# Patient Record
Sex: FEMALE | Race: WHITE | NOT HISPANIC OR LATINO | Employment: FULL TIME | URBAN - METROPOLITAN AREA
[De-identification: names, ages, dates, MRNs, and addresses within clinical notes are randomized per-mention and may not be internally consistent; named-entity substitution may affect disease eponyms.]

---

## 2018-04-29 PROBLEM — R07.9 CHEST PAIN: Status: RESOLVED | Noted: 2018-04-29 | Resolved: 2018-04-29

## 2018-04-29 PROBLEM — R07.9 CHEST PAIN: Status: ACTIVE | Noted: 2018-04-29

## 2018-04-29 PROBLEM — R06.09 DYSPNEA ON EXERTION: Status: ACTIVE | Noted: 2018-04-29

## 2018-04-29 PROBLEM — R74.8 ELEVATED LIVER ENZYMES: Status: RESOLVED | Noted: 2018-04-29 | Resolved: 2018-04-29

## 2018-04-29 PROBLEM — R74.8 ELEVATED LIVER ENZYMES: Status: ACTIVE | Noted: 2018-04-29

## 2018-04-29 PROBLEM — R06.09 DYSPNEA ON EXERTION: Status: RESOLVED | Noted: 2018-04-29 | Resolved: 2018-04-29

## 2019-09-30 ENCOUNTER — OFFICE VISIT (OUTPATIENT)
Dept: GASTROENTEROLOGY | Facility: CLINIC | Age: 49
End: 2019-09-30
Payer: COMMERCIAL

## 2019-09-30 ENCOUNTER — LAB VISIT (OUTPATIENT)
Dept: LAB | Facility: HOSPITAL | Age: 49
End: 2019-09-30
Attending: INTERNAL MEDICINE
Payer: COMMERCIAL

## 2019-09-30 VITALS
BODY MASS INDEX: 21.38 KG/M2 | SYSTOLIC BLOOD PRESSURE: 105 MMHG | RESPIRATION RATE: 18 BRPM | DIASTOLIC BLOOD PRESSURE: 58 MMHG | WEIGHT: 136.25 LBS | HEART RATE: 62 BPM | HEIGHT: 67 IN

## 2019-09-30 DIAGNOSIS — R79.89 ABNORMAL LIVER FUNCTION TESTS: ICD-10-CM

## 2019-09-30 DIAGNOSIS — R79.89 ABNORMAL LIVER FUNCTION TESTS: Primary | ICD-10-CM

## 2019-09-30 LAB
ALBUMIN SERPL BCP-MCNC: 3.9 G/DL (ref 3.5–5.2)
ALP SERPL-CCNC: 122 U/L (ref 55–135)
ALT SERPL W/O P-5'-P-CCNC: 205 U/L (ref 10–44)
ANION GAP SERPL CALC-SCNC: 6 MMOL/L (ref 8–16)
AST SERPL-CCNC: 58 U/L (ref 10–40)
BILIRUB SERPL-MCNC: 0.5 MG/DL (ref 0.1–1)
BUN SERPL-MCNC: 15 MG/DL (ref 6–20)
CALCIUM SERPL-MCNC: 8.9 MG/DL (ref 8.7–10.5)
CHLORIDE SERPL-SCNC: 107 MMOL/L (ref 95–110)
CO2 SERPL-SCNC: 28 MMOL/L (ref 23–29)
CREAT SERPL-MCNC: 0.9 MG/DL (ref 0.5–1.4)
EST. GFR  (AFRICAN AMERICAN): >60 ML/MIN/1.73 M^2
EST. GFR  (NON AFRICAN AMERICAN): >60 ML/MIN/1.73 M^2
GLUCOSE SERPL-MCNC: 100 MG/DL (ref 70–110)
POTASSIUM SERPL-SCNC: 4.7 MMOL/L (ref 3.5–5.1)
PROT SERPL-MCNC: 6.9 G/DL (ref 6–8.4)
SODIUM SERPL-SCNC: 141 MMOL/L (ref 136–145)

## 2019-09-30 PROCEDURE — 99205 OFFICE O/P NEW HI 60 MIN: CPT | Mod: S$GLB,,, | Performed by: INTERNAL MEDICINE

## 2019-09-30 PROCEDURE — 80053 COMPREHEN METABOLIC PANEL: CPT

## 2019-09-30 PROCEDURE — 36415 COLL VENOUS BLD VENIPUNCTURE: CPT

## 2019-09-30 PROCEDURE — 99999 PR PBB SHADOW E&M-EST. PATIENT-LVL III: ICD-10-PCS | Mod: PBBFAC,,, | Performed by: INTERNAL MEDICINE

## 2019-09-30 PROCEDURE — 99205 PR OFFICE/OUTPT VISIT, NEW, LEVL V, 60-74 MIN: ICD-10-PCS | Mod: S$GLB,,, | Performed by: INTERNAL MEDICINE

## 2019-09-30 PROCEDURE — 3008F PR BODY MASS INDEX (BMI) DOCUMENTED: ICD-10-PCS | Mod: CPTII,S$GLB,, | Performed by: INTERNAL MEDICINE

## 2019-09-30 PROCEDURE — 99999 PR PBB SHADOW E&M-EST. PATIENT-LVL III: CPT | Mod: PBBFAC,,, | Performed by: INTERNAL MEDICINE

## 2019-09-30 PROCEDURE — 3008F BODY MASS INDEX DOCD: CPT | Mod: CPTII,S$GLB,, | Performed by: INTERNAL MEDICINE

## 2019-09-30 RX ORDER — CYCLOBENZAPRINE HCL 5 MG
5 TABLET ORAL
COMMUNITY
End: 2022-04-28

## 2019-09-30 RX ORDER — SUCRALFATE 1 G/1
1 TABLET ORAL 4 TIMES DAILY
COMMUNITY
End: 2021-07-12

## 2019-09-30 RX ORDER — PANTOPRAZOLE SODIUM 40 MG/1
40 TABLET, DELAYED RELEASE ORAL DAILY
COMMUNITY
End: 2021-07-12

## 2019-09-30 RX ORDER — ACETAMINOPHEN 500 MG
2000 TABLET ORAL
COMMUNITY
Start: 2019-02-12 | End: 2022-04-28

## 2019-09-30 NOTE — PROGRESS NOTES
CC: abdominal pain    HPI 49 y.o. female with history of severe, substernal chest/epigastric abdominal pain associated with difficulty swallowing but without associated regurgitation. She had an episode with severe pain while at a movie, then was diaphoretic, collapsed to the floor and was taken to [facility]. While there she states she had nuclear stress testing and was negative for cardiac ischemia. CTA was also performed and negative for embolism.    She states that cold or hot liquids and foods do not play a role in her symptoms. She had full work-up in [place] by GI doctor prior to coming to see me with EGD. She does not have those records but was told it was normal and biopsies were unrevealing.     Medical records reviewed. Additional history supplemented by nursing.     PMH:  None    Past Surgical History:   Procedure Laterality Date    ESOPHAGOGASTRODUODENOSCOPY      Novasure       Social History  Social History     Tobacco Use    Smoking status: Never Smoker    Smokeless tobacco: Never Used   Substance Use Topics    Alcohol use: Yes     Alcohol/week: 1.0 standard drinks     Types: 1 Glasses of wine per week    Drug use: Never     Family History   Problem Relation Age of Onset    Diabetes Mother      Review of Systems  General ROS: negative for chills, fever or weight loss  Psychological ROS: negative for hallucination, depression or suicidal ideation  Ophthalmic ROS: negative for blurry vision, photophobia or eye pain  ENT ROS: negative for epistaxis, sore throat or rhinorrhea  Respiratory ROS: no cough, shortness of breath, or wheezing  Cardiovascular ROS: no chest pain or dyspnea on exertion  Gastrointestinal ROS: no abdominal pain, change in bowel habits, or black/ bloody stools  Genito-Urinary ROS: no dysuria, trouble voiding, or hematuria  Musculoskeletal ROS: negative for gait disturbance or muscular weakness  Neurological ROS: no syncope or seizures; no ataxia  Dermatological ROS: negative for  "pruritis, rash and jaundice    Physical Examination  BP (!) 105/58   Pulse 62   Resp 18   Ht 5' 7" (1.702 m)   Wt 61.8 kg (136 lb 3.9 oz)   BMI 21.34 kg/m²   General appearance: alert, cooperative, no distress  HENT: Normocephalic, atraumatic, neck symmetrical, no nasal discharge   Eyes: conjunctivae/corneas clear, PERRL, EOM's intact  Lungs: clear to auscultation bilaterally, no dullness to percussion bilaterally  Heart: regular rate and rhythm without rub; no displacement of the PMI   Abdomen: soft, non-tender; bowel sounds normoactive; no organomegaly  Extremities: extremities symmetric; no clubbing, cyanosis, or edema  Integument: Skin color, texture, turgor normal; no rashes; hair distrubution normal  Neurologic: Alert and oriented X 3, normal strength, normal coordination and gait  Psychiatric: no pressured speech; normal affect; no evidence of impaired cognition     Labs:  H/H 12.6/36.4  Plt 279  prior AST 2229, ALT 1348  Tbili 1.1  AP 86    Imaging:  CTA: No acute process of the thorax.  No evidence for pulmonary embolism.    Independently reviewed    Assessment:   1. Epigastric/chest pain  2. History of abnormal liver function tests (AST, ALT in 1000s during hospitalization 2018)     Plan:   -Obtain OSH records from prior GI physician in [place] who performed EGD  -Discontinue Protonix and Carafate for now as she has taken several months without improvement  -Repeat LFTs, suspect they may have been elevated in the setting of hypotension given hepatitis panel was negative at that time  -Follow up in 1 month, she may require esophageal motility study if these symptoms are related to esophageal spasm  -Could consider trial of nifedipine in future given that she has had relief with muscle relaxer but would review records first    Delonte Meraz MD  [facility]    "

## 2019-10-02 ENCOUNTER — PATIENT MESSAGE (OUTPATIENT)
Dept: GASTROENTEROLOGY | Facility: CLINIC | Age: 49
End: 2019-10-02

## 2019-10-04 ENCOUNTER — TELEPHONE (OUTPATIENT)
Dept: GASTROENTEROLOGY | Facility: CLINIC | Age: 49
End: 2019-10-04

## 2019-10-04 DIAGNOSIS — R79.89 ABNORMAL LIVER FUNCTION TESTS: Primary | ICD-10-CM

## 2019-10-04 NOTE — TELEPHONE ENCOUNTER
Called to review abnormal liver function tests with patient over telephone. Will plan further work-up with Iron, TIBC, ferritin, ISABEL, ASMA, AMA, Hep B surface antigen/antibody and Hep C antibody. She will also need Hep A total IgG, alpha 1 antitrypsin and ceruloplasmin. She will need ultrasound with elastography or fibroscan in the future as well as referral to hepatology. She verbalized understanding.

## 2019-10-28 ENCOUNTER — PATIENT MESSAGE (OUTPATIENT)
Dept: GASTROENTEROLOGY | Facility: CLINIC | Age: 49
End: 2019-10-28

## 2019-10-28 ENCOUNTER — TELEPHONE (OUTPATIENT)
Dept: GASTROENTEROLOGY | Facility: CLINIC | Age: 49
End: 2019-10-28

## 2019-10-28 NOTE — TELEPHONE ENCOUNTER
----- Message from Jeni  sent at 10/28/2019 12:30 PM CDT -----  Contact: pt  Type:  Patient Returning Call    Who Called:  pt  Who Left Message for Patient:  Rodri  Does the patient know what this is regarding?:  yes  Best Call Back Number:    Additional Information:  Pt is returning call from Nurse ,please call back with advice regarding appt call after 3 thanks

## 2019-11-25 ENCOUNTER — LAB VISIT (OUTPATIENT)
Dept: LAB | Facility: HOSPITAL | Age: 49
End: 2019-11-25
Attending: INTERNAL MEDICINE
Payer: COMMERCIAL

## 2019-11-25 ENCOUNTER — OFFICE VISIT (OUTPATIENT)
Dept: GASTROENTEROLOGY | Facility: CLINIC | Age: 49
End: 2019-11-25
Payer: COMMERCIAL

## 2019-11-25 VITALS
HEART RATE: 63 BPM | BODY MASS INDEX: 20.35 KG/M2 | HEIGHT: 67 IN | SYSTOLIC BLOOD PRESSURE: 123 MMHG | RESPIRATION RATE: 16 BRPM | DIASTOLIC BLOOD PRESSURE: 70 MMHG | WEIGHT: 129.63 LBS

## 2019-11-25 DIAGNOSIS — R79.89 ABNORMAL LIVER FUNCTION TESTS: Primary | ICD-10-CM

## 2019-11-25 DIAGNOSIS — R79.89 ABNORMAL LIVER FUNCTION TESTS: ICD-10-CM

## 2019-11-25 LAB
A1AT SERPL-MCNC: 138 MG/DL (ref 100–190)
BASOPHILS # BLD AUTO: 0.04 K/UL (ref 0–0.2)
BASOPHILS NFR BLD: 0.9 % (ref 0–1.9)
CERULOPLASMIN SERPL-MCNC: 38 MG/DL (ref 15–45)
DIFFERENTIAL METHOD: NORMAL
EOSINOPHIL # BLD AUTO: 0.1 K/UL (ref 0–0.5)
EOSINOPHIL NFR BLD: 1.6 % (ref 0–8)
ERYTHROCYTE [DISTWIDTH] IN BLOOD BY AUTOMATED COUNT: 13.5 % (ref 11.5–14.5)
FERRITIN SERPL-MCNC: 83 NG/ML (ref 20–300)
HCT VFR BLD AUTO: 39.8 % (ref 37–48.5)
HGB BLD-MCNC: 13 G/DL (ref 12–16)
IMM GRANULOCYTES # BLD AUTO: 0.01 K/UL (ref 0–0.04)
LYMPHOCYTES # BLD AUTO: 1.5 K/UL (ref 1–4.8)
LYMPHOCYTES NFR BLD: 34.2 % (ref 18–48)
MCH RBC QN AUTO: 30.2 PG (ref 27–31)
MCHC RBC AUTO-ENTMCNC: 32.7 G/DL (ref 32–36)
MCV RBC AUTO: 93 FL (ref 82–98)
MONOCYTES # BLD AUTO: 0.3 K/UL (ref 0.3–1)
MONOCYTES NFR BLD: 7.6 % (ref 4–15)
NEUTROPHILS # BLD AUTO: 2.5 K/UL (ref 1.8–7.7)
NEUTROPHILS NFR BLD: 55.5 % (ref 38–73)
NRBC BLD-RTO: 0 /100 WBC
PLATELET # BLD AUTO: 305 K/UL (ref 150–350)
PMV BLD AUTO: 9.5 FL (ref 9.2–12.9)
RBC # BLD AUTO: 4.3 M/UL (ref 4–5.4)
WBC # BLD AUTO: 4.48 K/UL (ref 3.9–12.7)

## 2019-11-25 PROCEDURE — 99214 OFFICE O/P EST MOD 30 MIN: CPT | Mod: S$GLB,,, | Performed by: INTERNAL MEDICINE

## 2019-11-25 PROCEDURE — 86706 HEP B SURFACE ANTIBODY: CPT

## 2019-11-25 PROCEDURE — 82390 ASSAY OF CERULOPLASMIN: CPT

## 2019-11-25 PROCEDURE — 82103 ALPHA-1-ANTITRYPSIN TOTAL: CPT

## 2019-11-25 PROCEDURE — 86256 FLUORESCENT ANTIBODY TITER: CPT | Mod: 91

## 2019-11-25 PROCEDURE — 86790 VIRUS ANTIBODY NOS: CPT

## 2019-11-25 PROCEDURE — 86235 NUCLEAR ANTIGEN ANTIBODY: CPT

## 2019-11-25 PROCEDURE — 3008F BODY MASS INDEX DOCD: CPT | Mod: CPTII,S$GLB,, | Performed by: INTERNAL MEDICINE

## 2019-11-25 PROCEDURE — 99999 PR PBB SHADOW E&M-EST. PATIENT-LVL III: CPT | Mod: PBBFAC,,, | Performed by: INTERNAL MEDICINE

## 2019-11-25 PROCEDURE — 86038 ANTINUCLEAR ANTIBODIES: CPT

## 2019-11-25 PROCEDURE — 36415 COLL VENOUS BLD VENIPUNCTURE: CPT

## 2019-11-25 PROCEDURE — 83540 ASSAY OF IRON: CPT

## 2019-11-25 PROCEDURE — 99999 PR PBB SHADOW E&M-EST. PATIENT-LVL III: ICD-10-PCS | Mod: PBBFAC,,, | Performed by: INTERNAL MEDICINE

## 2019-11-25 PROCEDURE — 85025 COMPLETE CBC W/AUTO DIFF WBC: CPT

## 2019-11-25 PROCEDURE — 82728 ASSAY OF FERRITIN: CPT

## 2019-11-25 PROCEDURE — 86803 HEPATITIS C AB TEST: CPT

## 2019-11-25 PROCEDURE — 87340 HEPATITIS B SURFACE AG IA: CPT

## 2019-11-25 PROCEDURE — 99214 PR OFFICE/OUTPT VISIT, EST, LEVL IV, 30-39 MIN: ICD-10-PCS | Mod: S$GLB,,, | Performed by: INTERNAL MEDICINE

## 2019-11-25 PROCEDURE — 3008F PR BODY MASS INDEX (BMI) DOCUMENTED: ICD-10-PCS | Mod: CPTII,S$GLB,, | Performed by: INTERNAL MEDICINE

## 2019-11-25 RX ORDER — NITROGLYCERIN 0.4 MG/1
0.4 TABLET SUBLINGUAL
COMMUNITY
Start: 2019-06-19 | End: 2021-07-12

## 2019-11-25 NOTE — PROGRESS NOTES
"CC: abnormal liver function tests     HPI 49 y.o. female with incidental abnormal elevation in liver function tests without associated abdominal pain. During last office visit I noted she had a history of abnormal liver function testing. She had never had work-up for liver function tests in the past. She does report that her father had liver cirrhosis in his 40s-50s, but was told it was due to alcohol.    She also reports a history of substernal chest discomfort that resolved with one dose of cyclobenzaprine. She denies dysphagia. She denies odynophagia. She denies issues related to cold or hot liquids. She has had work-up in [place] by GI doctor prior to coming to see me with EGD. She reports the EGD was normal. She has had nuclear stress testing and was negative for cardiac ischemia. CTA was also performed and negative for embolism.    PMH  Abnormal liver function tests    Review of Systems  General ROS: negative for chills, fever or weight loss  Cardiovascular ROS: no chest pain or dyspnea on exertion  Gastrointestinal ROS: no abdominal pain, change in bowel habits, or black/ bloody stools    Physical Examination  /70 (BP Location: Right arm, Patient Position: Sitting, BP Method: Medium (Automatic))   Pulse 63   Resp 16   Ht 5' 7" (1.702 m)   Wt 58.8 kg (129 lb 10.1 oz)   BMI 20.30 kg/m²   General appearance: alert, cooperative, no distress  HENT: Normocephalic, atraumatic, neck symmetrical, no nasal discharge   Lungs: clear to auscultation bilaterally, no dullness to percussion bilaterally  Heart: regular rate and rhythm without rub; no displacement of the PMI   Abdomen: soft, non-tender; bowel sounds normoactive; no organomegaly  Extremities: extremities symmetric; no clubbing, cyanosis, or edema  Neurologic: Alert and oriented X 3, normal strength, normal coordination and gait    Labs:  Lab Results   Component Value Date    WBC 9.62 04/28/2018    HGB 12.6 04/28/2018    HCT 36.4 (L) 04/28/2018    MCV " 88 04/28/2018     04/28/2018     Imaging:  Lab Results   Component Value Date     (H) 09/30/2019    AST 58 (H) 09/30/2019    ALKPHOS 122 09/30/2019    BILITOT 0.5 09/30/2019          Imaging:  CTA: No acute process of the thorax.  No evidence for pulmonary embolism.     Independently reviewed     Assessment:   1. Epigastric/chest pain  2. Abnormal liver function tests     Plan:   -Recommend she bring OSH records from prior GI physician in [place] who performed EGD  -she may require esophageal motility study if these symptoms continue to occur and are related to esophageal spasm  -Could consider trial of nifedipine in future given that she has had relief with muscle relaxer    -Will plan further work-up of abnormal liver enzymes with Iron, TIBC, ferritin, ISABEL, ASMA, AMA, Hep B surface antigen/antibody and Hep C antibody. She will also need Hep A total IgG, alpha 1 antitrypsin and ceruloplasmin.   -She will need ultrasound with elastography or fibroscan in the future as well as possible referral to hepatology  -Consider liver biopsy if lab work is unrevealing of etiology of liver function abnormalities    Delonte Meraz MD  [facility]

## 2019-11-26 LAB
ANA SER QL IF: NORMAL
HBV SURFACE AB SER-ACNC: NEGATIVE M[IU]/ML
HBV SURFACE AG SERPL QL IA: NEGATIVE
HCV AB SERPL QL IA: NEGATIVE
HEPATITIS A ANTIBODY, IGG: NEGATIVE
IRON SERPL-MCNC: 89 UG/DL (ref 30–160)
MITOCHONDRIA AB TITR SER IF: NORMAL {TITER}
SATURATED IRON: 21 % (ref 20–50)
TOTAL IRON BINDING CAPACITY: 417 UG/DL (ref 250–450)
TRANSFERRIN SERPL-MCNC: 282 MG/DL (ref 200–375)

## 2019-11-27 LAB — SMOOTH MUSCLE AB TITR SER IF: ABNORMAL {TITER}

## 2019-12-05 ENCOUNTER — TELEPHONE (OUTPATIENT)
Dept: GASTROENTEROLOGY | Facility: CLINIC | Age: 49
End: 2019-12-05

## 2019-12-05 DIAGNOSIS — R79.89 ABNORMAL LFTS (LIVER FUNCTION TESTS): Primary | ICD-10-CM

## 2019-12-05 NOTE — PROGRESS NOTES
Reviewed lab results with patient over telephone. ISABEL, AMA, ceruloplasmin, transferrin saturation neagtive. ASMA positive, will plan on liver ultrasound first then further work up with anti-LKM.     If abdominal ultrasound is unrevealing then will pursue liver biopsy.

## 2019-12-05 NOTE — TELEPHONE ENCOUNTER
----- Message from Delonte Meraz MD sent at 12/5/2019  3:16 PM CST -----  This patient needs to be scheduled for a RUQ ultrasound.

## 2019-12-06 ENCOUNTER — HOSPITAL ENCOUNTER (OUTPATIENT)
Dept: RADIOLOGY | Facility: CLINIC | Age: 49
Discharge: HOME | End: 2019-12-06
Attending: INTERNAL MEDICINE
Payer: COMMERCIAL

## 2019-12-06 DIAGNOSIS — R79.89 ABNORMAL LFTS (LIVER FUNCTION TESTS): ICD-10-CM

## 2019-12-06 PROCEDURE — 76705 US ABDOMEN LIMITED: ICD-10-PCS | Mod: 26,,, | Performed by: RADIOLOGY

## 2019-12-06 PROCEDURE — 76705 ECHO EXAM OF ABDOMEN: CPT | Mod: 26,,, | Performed by: RADIOLOGY

## 2019-12-06 PROCEDURE — 76705 ECHO EXAM OF ABDOMEN: CPT | Mod: TC,PO

## 2019-12-09 ENCOUNTER — TELEPHONE (OUTPATIENT)
Dept: GASTROENTEROLOGY | Facility: CLINIC | Age: 49
End: 2019-12-09

## 2019-12-09 NOTE — TELEPHONE ENCOUNTER
----- Message from Delonte Meraz MD sent at 12/9/2019  6:55 AM CST -----  Please notify patient that the ultrasound showed a normal liver. There may be some gallstones, but otherwise liver appeared normal. She will need to be scheduled for liver biopsy. She should also obtain the additional labs I ordered.

## 2019-12-10 ENCOUNTER — TELEPHONE (OUTPATIENT)
Dept: GASTROENTEROLOGY | Facility: CLINIC | Age: 49
End: 2019-12-10

## 2019-12-10 NOTE — TELEPHONE ENCOUNTER
----- Message from Agatha Boucher sent at 12/10/2019  9:49 AM CST -----  Contact: [facility]  Type: Needs Medical Advice    Who Called:  Mad[facility]  Best Call Back Number: Madison at   Additional Information: Calling about the referral for a liver biopsy

## 2019-12-12 ENCOUNTER — TELEPHONE (OUTPATIENT)
Dept: RADIOLOGY | Facility: HOSPITAL | Age: 49
End: 2019-12-12

## 2019-12-12 ENCOUNTER — TELEPHONE (OUTPATIENT)
Dept: GASTROENTEROLOGY | Facility: CLINIC | Age: 49
End: 2019-12-12

## 2019-12-12 DIAGNOSIS — R79.89 ABNORMAL LFTS (LIVER FUNCTION TESTS): Primary | ICD-10-CM

## 2019-12-12 NOTE — PLAN OF CARE
Spoke to Kane Guo LPN at Dr. Meraz's office regarding reschedule of liver biopsy for 1/2/20.  Told her patient would like an earlier date. Kane said she will call other places and see if that is possible and then call patient. Also, Dr. Meraz will need to put in a correct order for the liver biopsy (gave her order number [id number]) and will need an updated H&P due to the fact that the last one was on 11/25/19 and they have to be within 30 days of procedure. Phyla given [facility] radiology RN's direct number.

## 2019-12-12 NOTE — PROGRESS NOTES
Scheduled: 1/2/20   7am arrival at [facility] for 9am procedure. NPO night before, make take morning meds with sip if water. Must have ride home due to sedation administration. Hold aspirin and aspirin containing products for 7 days prior to procedure and NSAIDS 2 days prior. Call if have any further questions.  Will call patient if we have a cancellation so she can be rescheduled at an earlier date.

## 2019-12-17 ENCOUNTER — TELEPHONE (OUTPATIENT)
Dept: GASTROENTEROLOGY | Facility: CLINIC | Age: 49
End: 2019-12-17

## 2019-12-17 DIAGNOSIS — R79.89 ABNORMAL LFTS (LIVER FUNCTION TESTS): Primary | ICD-10-CM

## 2019-12-17 NOTE — NURSING
"Order received for liver biopsy (function). Contacted [facility] pathology to see if there was a specific type of medium or any additional steps that needed to be taken to fulfill request. Per Jann[facility] Pathology tech- send specimens in formalin under epic order "Tissue Specimen to Pathology"   "

## 2019-12-17 NOTE — TELEPHONE ENCOUNTER
----- Message from Princess TITO Blackwell sent at 12/17/2019  1:41 PM CST -----  Contact: Smiley [facility]  Type: Needs Medical Advice    Who Called: Smiley ROBERTO   Best Call Back Number:   Additional Information: Requesting a call back in regards to patient needs PA for liver biopsy. Please call to advise

## 2019-12-17 NOTE — NURSING
VIRGILIO Kohler RN   Phone Number: [phone]          Previous Messages      ----- Message -----   From: Delonte Meraz MD   Sent: 12/17/2019   1:37 PM CST   To: Leti Hardy LPN   Subject: RE: Order Liver Biopsy under CT guidance ins*     Yes, I would like CT guided liver biopsy. Thank you.   ----- Message -----   From: Leti Hardy LPN   Sent: 12/17/2019  12:37 PM CST   To: Delonte Meraz MD   Subject: FW: Order Liver Biopsy under CT guidance ins*         ----- Message -----   From: Rin Gonzáles RN   Sent: 12/17/2019  12:15 PM CST   To: Radha Hernandez RN, Mariya SANTOS Staff   Subject: Order Liver Biopsy under CT guidance instead*     We received a request for an ultrasound liver biopsy for function on the attached patient. Our radiologist would like to use CT guidance if Dr Meraz is okay with that. Please let me know. Thank you.

## 2019-12-19 ENCOUNTER — TELEPHONE (OUTPATIENT)
Dept: GASTROENTEROLOGY | Facility: CLINIC | Age: 49
End: 2019-12-19

## 2019-12-19 NOTE — TELEPHONE ENCOUNTER
----- Message from Leti Andrew sent at 12/19/2019  1:14 PM CST -----  Contact: Patient   Type:  Patient Returning Call    Who Called:  Patient  Who Left Message for Patient: Phyla   Does the patient know what this is regarding?: An appt  Best Call Back Number:[phone]  Additional Information:Pt requesting a call back regarding an appt she has scheduled on 12/26/19 to see if she can reschedule appt. Please call and advise.

## 2019-12-19 NOTE — TELEPHONE ENCOUNTER
----- Message from Chhaya Jenkins sent at 12/19/2019 11:31 AM CST -----  Contact: Self   Type: Patient Call Back    What is the request in detail: Pt calling to speak to a nurse regarding rescheduling appt.     Can the clinic reply by [facility]? No    Would the patient rather a call back or a response via [facility]? Call back     Best call back number: [phone]

## 2019-12-20 ENCOUNTER — TELEPHONE (OUTPATIENT)
Dept: RADIOLOGY | Facility: HOSPITAL | Age: 49
End: 2019-12-20

## 2019-12-20 ENCOUNTER — TELEPHONE (OUTPATIENT)
Dept: GASTROENTEROLOGY | Facility: CLINIC | Age: 49
End: 2019-12-20

## 2019-12-20 DIAGNOSIS — R79.89 ABNORMAL LFTS (LIVER FUNCTION TESTS): Primary | ICD-10-CM

## 2019-12-20 NOTE — TELEPHONE ENCOUNTER
----- Message from Delonte Meraz MD sent at 12/20/2019  4:29 PM CST -----  This patient needs referral to hepatology on the [place] in January then will plan on liver biopsy after she sees hepatology, if they would like biopsy. Please cancel liver biopsy that is scheduled.

## 2019-12-20 NOTE — PROGRESS NOTES
Pre procedure instructions given.  Instructed on arrival time of 0700 on 1/14/20 to [facility].  NPO and to have a ride home.  Pt verbalized understanding.  
No

## 2019-12-23 ENCOUNTER — PATIENT MESSAGE (OUTPATIENT)
Dept: GASTROENTEROLOGY | Facility: CLINIC | Age: 49
End: 2019-12-23

## 2020-01-02 ENCOUNTER — TELEPHONE (OUTPATIENT)
Dept: GASTROENTEROLOGY | Facility: CLINIC | Age: 50
End: 2020-01-02

## 2020-01-02 NOTE — TELEPHONE ENCOUNTER
----- Message from Cely Sousa sent at 1/2/2020  8:53 AM CST -----  Contact: Smiley with [facility]  Type: Needs Medical Advice    Who Called:  Smiley Maharaj Call Back Number: [phone] Fax:[phone]  Additional Information: Smiley is needing a call back in regards to this patient. She needs to speak with someone about verification and clarification of the order for a liver biopsy and needs updated physical information sent.Please call back and advise.

## 2020-01-02 NOTE — TELEPHONE ENCOUNTER
Spoke w/ pt to inform for biopsy cancellation and also to inform or hepatology referral. Pt verbalized understanding.

## 2020-01-08 ENCOUNTER — PATIENT MESSAGE (OUTPATIENT)
Dept: GASTROENTEROLOGY | Facility: CLINIC | Age: 50
End: 2020-01-08

## 2020-01-23 ENCOUNTER — DOCUMENTATION ONLY (OUTPATIENT)
Dept: TRANSPLANT | Facility: CLINIC | Age: 50
End: 2020-01-23

## 2020-01-23 NOTE — LETTER
January 23, 2020    Kamilla Montes  [id number] [address] [facility] [address]      Dear Kamilla Montes:    Your doctor has referred you to the [facility]. We are sending this letter to remind you to make an appointment with us to complete the referral process.     Please call us at [phone] to schedule an appointment. We look forward to seeing you soon.     If you received a call and have been scheduled, please disregard this letter.       Sincerely,        [facility]   [facility] [place], [place] [id number] [phone]

## 2020-01-23 NOTE — NURSING
Pt records reviewed.   Pt will be referred to Hepatology.  Abnormal LFTs (liver function tests)  Initial referral received  from the [facility]que.   Referring Provider/diagnosis  FREDIS WICK      Referral letter sent to patient.

## 2020-02-17 ENCOUNTER — OFFICE VISIT (OUTPATIENT)
Dept: DERMATOLOGY | Facility: CLINIC | Age: 50
End: 2020-02-17
Payer: COMMERCIAL

## 2020-02-17 ENCOUNTER — TELEPHONE (OUTPATIENT)
Dept: DERMATOLOGY | Facility: CLINIC | Age: 50
End: 2020-02-17

## 2020-02-17 DIAGNOSIS — L65.9 HAIR LOSS DISORDER: Primary | ICD-10-CM

## 2020-02-17 PROCEDURE — 99999 PR PBB SHADOW E&M-EST. PATIENT-LVL II: CPT | Mod: PBBFAC,,, | Performed by: PHYSICIAN ASSISTANT

## 2020-02-17 PROCEDURE — 99202 PR OFFICE/OUTPT VISIT, NEW, LEVL II, 15-29 MIN: ICD-10-PCS | Mod: S$GLB,,, | Performed by: PHYSICIAN ASSISTANT

## 2020-02-17 PROCEDURE — 99202 OFFICE O/P NEW SF 15 MIN: CPT | Mod: S$GLB,,, | Performed by: PHYSICIAN ASSISTANT

## 2020-02-17 PROCEDURE — 99999 PR PBB SHADOW E&M-EST. PATIENT-LVL II: ICD-10-PCS | Mod: PBBFAC,,, | Performed by: PHYSICIAN ASSISTANT

## 2020-02-17 NOTE — TELEPHONE ENCOUNTER
Called pt per [facility]. Scheduled pt to come in to have a scalp biopsy.    TB    ----- Message from Padmini Garduno PA-C sent at 2/17/2020  3:35 PM CST -----  Please contact pt to schedule biopsy - either call or message, she is fine with whatever. I've already discussed it with her. Thanks

## 2020-02-17 NOTE — PROGRESS NOTES
"  Subjective:       Patient ID:  Kamilla Montes is a 49 y.o. female who presents for   Chief Complaint   Patient presents with    Hair Loss     scalp,  X3yrs, shedding, otc tx      Hair Loss  - Initial  Affected locations: scalp (and eyebrows and eyelashes)  Duration: 3 years (worsened recently)  Signs and Symptoms: thinning, shedding, receding frontal hairline.  Treatments tried: Biotin and MTV gummies x few months; Rodan and Fields lash boost x 1 yr.  Improvement on treatment: no relief    Hx of significantly elevated LFTs (seeing GI) - liver US wnl. Has appt with hepatology next month. Also with hx of positive anti-smooth muscle antibody.  + substernal chest pain that resolves with flexeril.  No known hx of thyroid d/o (TSH slightly elevated in 2018).  Recent iron, TIBC, and CBC wnl.     Review of Systems   Constitutional: Negative for fever, weight loss, weight gain and fatigue.   HENT: Negative for mouth sores.    Genitourinary: Negative for irregular periods (could not take OCP 2/2 "blood clots" in legs; s/p Novasure - no longer has cycle).   Skin: Negative for itching and dry skin.   Hematologic/Lymphatic: Does not bruise/bleed easily.        Objective:    Physical Exam   Constitutional: She appears well-developed and well-nourished. No distress.   Neurological: She is alert and oriented to person, place, and time. She is not disoriented.   Psychiatric: She has a normal mood and affect.   Skin:   Areas Examined (abnormalities noted in diagram):   Scalp / Hair Palpated and Inspected  Head / Face Inspection Performed  Neck Inspection Performed              Diagram Legend     Erythematous scaling macule/papule c/w actinic keratosis       Vascular papule c/w angioma      Pigmented verrucoid papule/plaque c/w seborrheic keratosis      Yellow umbilicated papule c/w sebaceous hyperplasia      Irregularly shaped tan macule c/w lentigo     1-2 mm smooth white papules consistent with Milia      Movable subcutaneous " cyst with punctum c/w epidermal inclusion cyst      Subcutaneous movable cyst c/w pilar cyst      Firm pink to brown papule c/w dermatofibroma      Pedunculated fleshy papule(s) c/w skin tag(s)      Evenly pigmented macule c/w junctional nevus     Mildly variegated pigmented, slightly irregular-bordered macule c/w mildly atypical nevus      Flesh colored to evenly pigmented papule c/w intradermal nevus       Pink pearly papule/plaque c/w basal cell carcinoma      Erythematous hyperkeratotic cursted plaque c/w SCC      Surgical scar with no sign of skin cancer recurrence      Open and closed comedones      Inflammatory papules and pustules      Verrucoid papule consistent consistent with wart     Erythematous eczematous patches and plaques     Dystrophic onycholytic nail with subungual debris c/w onychomycosis     Umbilicated papule    Erythematous-base heme-crusted tan verrucoid plaque consistent with inflamed seborrheic keratosis     Erythematous Silvery Scaling Plaque c/w Psoriasis     See annotation    Assessment / Plan:      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 Pt is going to a [place] this weekend and would like to defer until next week.    Continue to f/u with hepatology and GI for elevated LFTs and +ASMA.         Follow up for scalp biopsy at pt convenience.

## 2020-03-06 ENCOUNTER — PATIENT MESSAGE (OUTPATIENT)
Dept: DERMATOLOGY | Facility: CLINIC | Age: 50
End: 2020-03-06

## 2020-03-12 ENCOUNTER — TELEPHONE (OUTPATIENT)
Dept: HEPATOLOGY | Facility: CLINIC | Age: 50
End: 2020-03-12

## 2020-03-12 NOTE — TELEPHONE ENCOUNTER
Attempted to contact patient and confirm appointment for Monday but patient did not answer. Left patient a voicemail stating the purpose for the call. Awaiting a call back.

## 2020-03-16 ENCOUNTER — LAB VISIT (OUTPATIENT)
Dept: LAB | Facility: HOSPITAL | Age: 50
End: 2020-03-16
Attending: INTERNAL MEDICINE
Payer: COMMERCIAL

## 2020-03-16 ENCOUNTER — OFFICE VISIT (OUTPATIENT)
Dept: HEPATOLOGY | Facility: CLINIC | Age: 50
End: 2020-03-16
Attending: INTERNAL MEDICINE
Payer: COMMERCIAL

## 2020-03-16 VITALS
OXYGEN SATURATION: 99 % | SYSTOLIC BLOOD PRESSURE: 116 MMHG | HEART RATE: 69 BPM | BODY MASS INDEX: 20.73 KG/M2 | WEIGHT: 132.06 LBS | DIASTOLIC BLOOD PRESSURE: 65 MMHG | RESPIRATION RATE: 18 BRPM | HEIGHT: 67 IN

## 2020-03-16 DIAGNOSIS — R74.8 ELEVATED LIVER ENZYMES: ICD-10-CM

## 2020-03-16 DIAGNOSIS — R74.8 ELEVATED LIVER ENZYMES: Primary | ICD-10-CM

## 2020-03-16 LAB
ALBUMIN SERPL BCP-MCNC: 3.9 G/DL (ref 3.5–5.2)
ALP SERPL-CCNC: 88 U/L (ref 55–135)
ALT SERPL W/O P-5'-P-CCNC: 14 U/L (ref 10–44)
ANION GAP SERPL CALC-SCNC: 6 MMOL/L (ref 8–16)
AST SERPL-CCNC: 19 U/L (ref 10–40)
BILIRUB SERPL-MCNC: 0.4 MG/DL (ref 0.1–1)
BUN SERPL-MCNC: 14 MG/DL (ref 6–20)
CALCIUM SERPL-MCNC: 8.9 MG/DL (ref 8.7–10.5)
CHLORIDE SERPL-SCNC: 107 MMOL/L (ref 95–110)
CO2 SERPL-SCNC: 26 MMOL/L (ref 23–29)
CREAT SERPL-MCNC: 0.8 MG/DL (ref 0.5–1.4)
EST. GFR  (AFRICAN AMERICAN): >60 ML/MIN/1.73 M^2
EST. GFR  (NON AFRICAN AMERICAN): >60 ML/MIN/1.73 M^2
GLUCOSE SERPL-MCNC: 89 MG/DL (ref 70–110)
POTASSIUM SERPL-SCNC: 4.2 MMOL/L (ref 3.5–5.1)
PROT SERPL-MCNC: 7.2 G/DL (ref 6–8.4)
SODIUM SERPL-SCNC: 139 MMOL/L (ref 136–145)

## 2020-03-16 PROCEDURE — 99203 PR OFFICE/OUTPT VISIT, NEW, LEVL III, 30-44 MIN: ICD-10-PCS | Mod: S$GLB,,, | Performed by: INTERNAL MEDICINE

## 2020-03-16 PROCEDURE — 99999 PR PBB SHADOW E&M-EST. PATIENT-LVL III: ICD-10-PCS | Mod: PBBFAC,,, | Performed by: INTERNAL MEDICINE

## 2020-03-16 PROCEDURE — 3008F BODY MASS INDEX DOCD: CPT | Mod: CPTII,S$GLB,, | Performed by: INTERNAL MEDICINE

## 2020-03-16 PROCEDURE — 99203 OFFICE O/P NEW LOW 30 MIN: CPT | Mod: S$GLB,,, | Performed by: INTERNAL MEDICINE

## 2020-03-16 PROCEDURE — 80053 COMPREHEN METABOLIC PANEL: CPT

## 2020-03-16 PROCEDURE — 99999 PR PBB SHADOW E&M-EST. PATIENT-LVL III: CPT | Mod: PBBFAC,,, | Performed by: INTERNAL MEDICINE

## 2020-03-16 PROCEDURE — 36415 COLL VENOUS BLD VENIPUNCTURE: CPT

## 2020-03-16 PROCEDURE — 3008F PR BODY MASS INDEX (BMI) DOCUMENTED: ICD-10-PCS | Mod: CPTII,S$GLB,, | Performed by: INTERNAL MEDICINE

## 2020-03-16 NOTE — LETTER
March 16, 2020      Delonte Meraz MD  [address] [facility] [address] [place] [address]           [facility]  [address]  [place] [address]  Phone: [phone]  Fax: [phone]          Patient: Kamilla Montes   MR Number: [id number]   YOB: 1970   Date of Visit: 3/16/2020       Dear Dr. Delonte Meraz:    Thank you for referring Kamilla Montes to me for evaluation. Attached you will find relevant portions of my assessment and plan of care.    If you have questions, please do not hesitate to call me. I look forward to following Kamilla Montes along with you.    Sincerely,    Lewis Perez MD    Enclosure  CC:  No Recipients    If you would like to receive this communication electronically, please contact [email or web address] or [phone] to request more information on [employer] access.    For providers and/or their staff who would like to refer a patient to [facility], please contact us through our one-stop-shop provider referral line, [facility], at [phone].    If you feel you have received this communication in error or would no longer like to receive these types of communications, please e-mail [email or web address]

## 2020-03-16 NOTE — PROGRESS NOTES
HEPATOLOGY CONSULTATION    Referring Physician: Dr. PELON Meraz  Current Corresponding Physician:     Reason for Consultation: Consultation for evaluation of Elevated Hepatic Enzymes    History of Present Illness: Kamilla Montes is a 49 y.o. femalewho presents for evaluation of   Chief Complaint   Patient presents with    Elevated Hepatic Enzymes    This 49-year-old woman was originally evaluated 2 years ago for acute chest pain.  She had a set of liver enzymes which were significantly elevated.  Liver enzymes were repeated in April 2018 and they were in the thousands.  An extensive diagnostic workup including viral markers and autoantibodies was negative.  Her AST and ALT were repeated in September of last year and they remained elevated in the 2-5 times the upper limit of normal range.  An ultrasound of the liver showed no abnormalities.  The plan was to proceed with a liver biopsy.  She currently has no symptoms of chronic liver disease.  Her alcohol consumption is minimal.  She does not not have risk factors for nonalcoholic fatty liver disease.    Past Medical History:   Diagnosis Date    Chest pain 2016    reoccurent - pressure then turns to stabbing     Outpatient Encounter Medications as of 3/16/2020   Medication Sig Dispense Refill    cholecalciferol, vitamin D3, (VITAMIN D3) 2,000 unit Cap Take 2,000 Units by mouth.      cyclobenzaprine (FLEXERIL) 5 MG tablet Take 5 mg by mouth.      nitroGLYCERIN (NITROSTAT) 0.4 MG SL tablet Place 0.4 mg under the tongue.      omeprazole magnesium 10 mg SuDR Take by mouth.      pantoprazole (PROTONIX) 40 MG tablet Take 40 mg by mouth once daily.      sucralfate (CARAFATE) 1 gram tablet Take 1 g by mouth 4 (four) times daily.       No facility-administered encounter medications on file as of 3/16/2020.      Review of patient's allergies indicates:  No Known Allergies  Family History   Problem Relation Age of Onset    Diabetes Mother        Social History      Socioeconomic History    Marital status:      Spouse name: Not on file    Number of children: Not on file    Years of education: Not on file    Highest education level: Not on file   Occupational History    Not on file   Social Needs    Financial resource strain: Not on file    Food insecurity:     Worry: Not on file     Inability: Not on file    Transportation needs:     Medical: Not on file     Non-medical: Not on file   Tobacco Use    Smoking status: Never Smoker    Smokeless tobacco: Never Used   Substance and Sexual Activity    Alcohol use: Yes     Alcohol/week: 1.0 standard drinks     Types: 1 Glasses of wine per week     Comment: socially     Drug use: Never    Sexual activity: Not Currently   Lifestyle    Physical activity:     Days per week: Not on file     Minutes per session: Not on file    Stress: Not on file   Relationships    Social connections:     Talks on phone: Not on file     Gets together: Not on file     Attends Confucianism service: Not on file     Active member of club or organization: Not on file     Attends meetings of clubs or organizations: Not on file     Relationship status: Not on file   Other Topics Concern    Not on file   Social History Narrative    Not on file     Review of Systems   Constitutional: Negative for appetite change, fatigue and unexpected weight change.   HENT: Negative for ear pain, hearing loss, sore throat and trouble swallowing.    Eyes: Negative for visual disturbance.   Respiratory: Negative for cough and shortness of breath.    Cardiovascular: Negative for chest pain and palpitations.   Gastrointestinal: Negative for abdominal pain, nausea and vomiting.   Genitourinary: Negative for difficulty urinating and dysuria.   Musculoskeletal: Negative for arthralgias and back pain.   Skin: Negative for rash.   Neurological: Negative for tremors, seizures and headaches.   Psychiatric/Behavioral: Negative for agitation and decreased concentration.      Vitals:    03/16/20 0928   BP: 116/65   Pulse: 69   Resp: 18       Physical Exam   Constitutional: She is oriented to person, place, and time. She appears well-developed and well-nourished.   HENT:   Right Ear: External ear normal.   Left Ear: External ear normal.   Mouth/Throat: Oropharynx is clear and moist.   Eyes: No scleral icterus.   Cardiovascular: Normal rate, regular rhythm and normal heart sounds. Exam reveals no gallop and no friction rub.   No murmur heard.  Pulmonary/Chest: Effort normal and breath sounds normal. No respiratory distress. She has no wheezes.   Abdominal: Soft. Bowel sounds are normal. She exhibits no distension and no mass. There is no tenderness.   Musculoskeletal: Normal range of motion. She exhibits no edema.   Lymphadenopathy:     She has no cervical adenopathy.   Neurological: She is alert and oriented to person, place, and time. She has normal strength.   Skin: Skin is warm, dry and intact. She is not diaphoretic. No pallor.       Computed MELD-Na score unavailable. Necessary lab results were not found in the last year.  Computed MELD score unavailable. Necessary lab results were not found in the last year.    Lab Results   Component Value Date     09/30/2019    BUN 15 09/30/2019    CREATININE 0.9 09/30/2019    CALCIUM 8.9 09/30/2019     09/30/2019    K 4.7 09/30/2019     09/30/2019    PROT 6.9 09/30/2019    CO2 28 09/30/2019    ANIONGAP 6 (L) 09/30/2019    WBC 4.48 11/25/2019    RBC 4.30 11/25/2019    HGB 13.0 11/25/2019    HCT 39.8 11/25/2019    MCV 93 11/25/2019    MCH 30.2 11/25/2019    MCHC 32.7 11/25/2019     Lab Results   Component Value Date    RDW 13.5 11/25/2019     11/25/2019    MPV 9.5 11/25/2019    GRAN 2.5 11/25/2019    GRAN 55.5 11/25/2019    LYMPH 1.5 11/25/2019    LYMPH 34.2 11/25/2019    MONO 0.3 11/25/2019    MONO 7.6 11/25/2019    EOSINOPHIL 1.6 11/25/2019    BASOPHIL 0.9 11/25/2019    EOS 0.1 11/25/2019    BASO 0.04 11/25/2019     CHOL 137 04/29/2018    TRIG 41 04/29/2018    HDL 59 04/29/2018    CHOLHDL 43.1 04/29/2018    TOTALCHOLEST 2.3 04/29/2018    ALBUMIN 3.9 09/30/2019    AST 58 (H) 09/30/2019     (H) 09/30/2019    ALKPHOS 122 09/30/2019       Assessment and Plan:  Patient Active Problem List   Diagnosis    Elevated liver enzymes     Kamilla Montes is a 49 y.o. female withElevated Hepatic Enzymes    Impression:  Etiology unclear    Plan:  I will recheck her liver chemistry today.  If her liver enzymes have normalized I do not think it is necessary to proceed with the liver biopsy and will just monitor her liver enzymes periodically.  If her liver chemistry remains elevated we will proceed with a diagnostic liver biopsy.

## 2021-04-29 ENCOUNTER — PATIENT MESSAGE (OUTPATIENT)
Dept: RESEARCH | Facility: HOSPITAL | Age: 51
End: 2021-04-29

## 2021-07-12 ENCOUNTER — OFFICE VISIT (OUTPATIENT)
Dept: OTOLARYNGOLOGY | Facility: CLINIC | Age: 51
End: 2021-07-12
Payer: COMMERCIAL

## 2021-07-12 DIAGNOSIS — J31.0 CHRONIC RHINITIS: Primary | ICD-10-CM

## 2021-07-12 PROCEDURE — 99203 OFFICE O/P NEW LOW 30 MIN: CPT | Mod: S$GLB,,, | Performed by: NURSE PRACTITIONER

## 2021-07-12 PROCEDURE — 99999 PR PBB SHADOW E&M-EST. PATIENT-LVL III: CPT | Mod: PBBFAC,,, | Performed by: NURSE PRACTITIONER

## 2021-07-12 PROCEDURE — 99203 PR OFFICE/OUTPT VISIT, NEW, LEVL III, 30-44 MIN: ICD-10-PCS | Mod: S$GLB,,, | Performed by: NURSE PRACTITIONER

## 2021-07-12 PROCEDURE — 99999 PR PBB SHADOW E&M-EST. PATIENT-LVL III: ICD-10-PCS | Mod: PBBFAC,,, | Performed by: NURSE PRACTITIONER

## 2021-07-12 PROCEDURE — 1126F PR PAIN SEVERITY QUANTIFIED, NO PAIN PRESENT: ICD-10-PCS | Mod: S$GLB,,, | Performed by: NURSE PRACTITIONER

## 2021-07-12 PROCEDURE — 1126F AMNT PAIN NOTED NONE PRSNT: CPT | Mod: S$GLB,,, | Performed by: NURSE PRACTITIONER

## 2021-07-12 RX ORDER — LEVOCETIRIZINE DIHYDROCHLORIDE 5 MG/1
5 TABLET, FILM COATED ORAL NIGHTLY
Qty: 30 TABLET | Refills: 2 | Status: SHIPPED | OUTPATIENT
Start: 2021-07-12 | End: 2022-04-28

## 2021-11-28 ENCOUNTER — IMMUNIZATION (OUTPATIENT)
Dept: PRIMARY CARE CLINIC | Facility: CLINIC | Age: 51
End: 2021-11-28
Payer: COMMERCIAL

## 2021-11-28 DIAGNOSIS — Z23 NEED FOR VACCINATION: Primary | ICD-10-CM

## 2021-11-28 PROCEDURE — 0064A COVID-19, MRNA, LNP-S, PF, 100 MCG/0.25 ML DOSE VACCINE (MODERNA BOOSTER): CPT | Mod: PBBFAC,CV19 | Performed by: INTERNAL MEDICINE

## 2022-04-28 ENCOUNTER — PATIENT MESSAGE (OUTPATIENT)
Dept: OBSTETRICS AND GYNECOLOGY | Facility: CLINIC | Age: 52
End: 2022-04-28

## 2022-04-28 ENCOUNTER — OFFICE VISIT (OUTPATIENT)
Dept: OBSTETRICS AND GYNECOLOGY | Facility: CLINIC | Age: 52
End: 2022-04-28
Payer: COMMERCIAL

## 2022-04-28 ENCOUNTER — LAB VISIT (OUTPATIENT)
Dept: LAB | Facility: HOSPITAL | Age: 52
End: 2022-04-28
Attending: OBSTETRICS & GYNECOLOGY
Payer: COMMERCIAL

## 2022-04-28 ENCOUNTER — TELEPHONE (OUTPATIENT)
Dept: GASTROENTEROLOGY | Facility: CLINIC | Age: 52
End: 2022-04-28
Payer: COMMERCIAL

## 2022-04-28 VITALS
HEART RATE: 77 BPM | HEIGHT: 67 IN | RESPIRATION RATE: 18 BRPM | DIASTOLIC BLOOD PRESSURE: 70 MMHG | BODY MASS INDEX: 22.2 KG/M2 | SYSTOLIC BLOOD PRESSURE: 116 MMHG | OXYGEN SATURATION: 98 % | WEIGHT: 141.44 LBS

## 2022-04-28 DIAGNOSIS — Z12.11 SCREENING FOR COLON CANCER: ICD-10-CM

## 2022-04-28 DIAGNOSIS — Z12.4 SCREENING FOR MALIGNANT NEOPLASM OF CERVIX: ICD-10-CM

## 2022-04-28 DIAGNOSIS — N39.41 URGE INCONTINENCE OF URINE: ICD-10-CM

## 2022-04-28 DIAGNOSIS — Z01.419 WELL WOMAN EXAM WITH ROUTINE GYNECOLOGICAL EXAM: ICD-10-CM

## 2022-04-28 DIAGNOSIS — Z78.0 MENOPAUSE: ICD-10-CM

## 2022-04-28 DIAGNOSIS — R07.89 CHEST PAIN, MUSCULOSKELETAL: ICD-10-CM

## 2022-04-28 DIAGNOSIS — L65.9 HAIR LOSS: ICD-10-CM

## 2022-04-28 DIAGNOSIS — Z12.11 COLON CANCER SCREENING: Primary | ICD-10-CM

## 2022-04-28 DIAGNOSIS — Z12.31 ENCOUNTER FOR SCREENING MAMMOGRAM FOR MALIGNANT NEOPLASM OF BREAST: ICD-10-CM

## 2022-04-28 DIAGNOSIS — Z78.0 MENOPAUSE: Primary | ICD-10-CM

## 2022-04-28 LAB
ALBUMIN SERPL BCP-MCNC: 4.1 G/DL (ref 3.5–5.2)
ALP SERPL-CCNC: 71 U/L (ref 55–135)
ALT SERPL W/O P-5'-P-CCNC: 21 U/L (ref 10–44)
ANION GAP SERPL CALC-SCNC: 9 MMOL/L (ref 8–16)
AST SERPL-CCNC: 20 U/L (ref 10–40)
BILIRUB SERPL-MCNC: 0.6 MG/DL (ref 0.1–1)
BUN SERPL-MCNC: 17 MG/DL (ref 6–20)
CALCIUM SERPL-MCNC: 9.2 MG/DL (ref 8.7–10.5)
CHLORIDE SERPL-SCNC: 106 MMOL/L (ref 95–110)
CO2 SERPL-SCNC: 26 MMOL/L (ref 23–29)
CREAT SERPL-MCNC: 0.7 MG/DL (ref 0.5–1.4)
EST. GFR  (AFRICAN AMERICAN): >60 ML/MIN/1.73 M^2
EST. GFR  (NON AFRICAN AMERICAN): >60 ML/MIN/1.73 M^2
ESTIMATED AVG GLUCOSE: 108 MG/DL (ref 68–131)
GLUCOSE SERPL-MCNC: 97 MG/DL (ref 70–110)
HBA1C MFR BLD: 5.4 % (ref 4–5.6)
POTASSIUM SERPL-SCNC: 4.3 MMOL/L (ref 3.5–5.1)
PROT SERPL-MCNC: 7.3 G/DL (ref 6–8.4)
SODIUM SERPL-SCNC: 141 MMOL/L (ref 136–145)

## 2022-04-28 PROCEDURE — 83002 ASSAY OF GONADOTROPIN (LH): CPT | Performed by: OBSTETRICS & GYNECOLOGY

## 2022-04-28 PROCEDURE — 99386 PR PREVENTIVE VISIT,NEW,40-64: ICD-10-PCS | Mod: S$GLB,,, | Performed by: OBSTETRICS & GYNECOLOGY

## 2022-04-28 PROCEDURE — 3008F PR BODY MASS INDEX (BMI) DOCUMENTED: ICD-10-PCS | Mod: CPTII,S$GLB,, | Performed by: OBSTETRICS & GYNECOLOGY

## 2022-04-28 PROCEDURE — 82626 DEHYDROEPIANDROSTERONE: CPT | Performed by: OBSTETRICS & GYNECOLOGY

## 2022-04-28 PROCEDURE — 99999 PR PBB SHADOW E&M-EST. PATIENT-LVL IV: ICD-10-PCS | Mod: PBBFAC,,, | Performed by: OBSTETRICS & GYNECOLOGY

## 2022-04-28 PROCEDURE — 88175 CYTOPATH C/V AUTO FLUID REDO: CPT | Performed by: OBSTETRICS & GYNECOLOGY

## 2022-04-28 PROCEDURE — 3078F PR MOST RECENT DIASTOLIC BLOOD PRESSURE < 80 MM HG: ICD-10-PCS | Mod: CPTII,S$GLB,, | Performed by: OBSTETRICS & GYNECOLOGY

## 2022-04-28 PROCEDURE — 87624 HPV HI-RISK TYP POOLED RSLT: CPT | Performed by: OBSTETRICS & GYNECOLOGY

## 2022-04-28 PROCEDURE — 3008F BODY MASS INDEX DOCD: CPT | Mod: CPTII,S$GLB,, | Performed by: OBSTETRICS & GYNECOLOGY

## 2022-04-28 PROCEDURE — 99999 PR PBB SHADOW E&M-EST. PATIENT-LVL IV: CPT | Mod: PBBFAC,,, | Performed by: OBSTETRICS & GYNECOLOGY

## 2022-04-28 PROCEDURE — 80053 COMPREHEN METABOLIC PANEL: CPT | Performed by: OBSTETRICS & GYNECOLOGY

## 2022-04-28 PROCEDURE — 1159F MED LIST DOCD IN RCRD: CPT | Mod: CPTII,S$GLB,, | Performed by: OBSTETRICS & GYNECOLOGY

## 2022-04-28 PROCEDURE — 83036 HEMOGLOBIN GLYCOSYLATED A1C: CPT | Performed by: OBSTETRICS & GYNECOLOGY

## 2022-04-28 PROCEDURE — 82670 ASSAY OF TOTAL ESTRADIOL: CPT | Performed by: OBSTETRICS & GYNECOLOGY

## 2022-04-28 PROCEDURE — 99386 PREV VISIT NEW AGE 40-64: CPT | Mod: S$GLB,,, | Performed by: OBSTETRICS & GYNECOLOGY

## 2022-04-28 PROCEDURE — 3078F DIAST BP <80 MM HG: CPT | Mod: CPTII,S$GLB,, | Performed by: OBSTETRICS & GYNECOLOGY

## 2022-04-28 PROCEDURE — 83001 ASSAY OF GONADOTROPIN (FSH): CPT | Performed by: OBSTETRICS & GYNECOLOGY

## 2022-04-28 PROCEDURE — 3074F PR MOST RECENT SYSTOLIC BLOOD PRESSURE < 130 MM HG: ICD-10-PCS | Mod: CPTII,S$GLB,, | Performed by: OBSTETRICS & GYNECOLOGY

## 2022-04-28 PROCEDURE — 3074F SYST BP LT 130 MM HG: CPT | Mod: CPTII,S$GLB,, | Performed by: OBSTETRICS & GYNECOLOGY

## 2022-04-28 PROCEDURE — 1159F PR MEDICATION LIST DOCUMENTED IN MEDICAL RECORD: ICD-10-PCS | Mod: CPTII,S$GLB,, | Performed by: OBSTETRICS & GYNECOLOGY

## 2022-04-28 RX ORDER — CYCLOBENZAPRINE HCL 5 MG
5 TABLET ORAL 3 TIMES DAILY PRN
Qty: 30 TABLET | Refills: 0 | Status: SHIPPED | OUTPATIENT
Start: 2022-04-28 | End: 2022-05-08

## 2022-04-28 NOTE — TELEPHONE ENCOUNTER
Colonoscopy scheduled with patient: 6/8/2022 0800 present 0700 . MagCitrate prep. Fully Covid vaccinated, but not reflected in chart. Patient aware to bring vaccine card to registration with her. Questions asked about: bowel changes, painful movements, diarrhea, constipation, abdominal pain, visible blood in stool, acid reflux. Patient denies any issues. Asked about kidney issues/concerns, patient denies. Reviewed instructions with patient with their voicing understanding. Instructions via portal.

## 2022-04-28 NOTE — PROGRESS NOTES
Chief Complaint   Patient presents with    Well Woman       History and Physical:  No LMP recorded. Patient has had an ablation.       Kamilla Montes is a 51 y.o.  who presents today for her routine annual GYN exam.     She reports an approximately 7 year history of hair loss including her eyebrows.  She states that lab work was tested in the past but workup was negative.  She has not been seen by Dermatology.    She has a history of bladder sling but has recently been noting some increasing issues with urinary urgency.  No hematuria or dysuria.    In the past she was diagnosed with musculoskeletal chest pain and occasionally uses cyclobenzaprine for this issue on requests a refill.    For menopausal standpoint she does note some worsening recent hot flashes.    Allergies: Review of patient's allergies indicates:  No Known Allergies    Past Medical History:   Diagnosis Date    Chest pain, musculoskeletal        Past Surgical History:   Procedure Laterality Date    AUGMENTATION OF BREAST Bilateral     BLADDER SUSPENSION      ESOPHAGOGASTRODUODENOSCOPY      [facility]:   Current Outpatient Medications:     cholecalciferol, vitamin D3, (VITAMIN D3) 2,000 unit Cap, Take 2,000 Units by mouth., Disp: , Rfl:     cyclobenzaprine (FLEXERIL) 5 MG tablet, Take 1 tablet (5 mg total) by mouth 3 (three) times daily as needed for Muscle spasms., Disp: 30 tablet, Rfl: 0    levocetirizine (XYZAL) 5 MG tablet, Take 1 tablet (5 mg total) by mouth every evening., Disp: 30 tablet, Rfl: 2     OB History        2    Para   2    Term   2            AB        Living   2       SAB        IAB        Ectopic        Multiple        Live Births               Obstetric Comments   Vaginal delivery x2             Social History     Socioeconomic History    Marital status:    Tobacco Use    Smoking status: Never Smoker    Smokeless tobacco: Never Used   Substance and Sexual Activity    Alcohol use:  "Yes     Alcohol/week: 1.0 standard drink     Types: 1 Glasses of wine per week     Comment: socially     Drug use: Never    Sexual activity: Not Currently       Family History   Problem Relation Age of Onset    Diabetes Mother          Past medical and surgical history reviewed.   I have reviewed the patient's medical history in detail and updated the computerized patient record.    Review of System:   Review of Systems   Constitutional: Negative for chills, fever and unexpected weight change.   HENT: Negative for congestion, ear pain, sinus pain and sore throat.    Eyes: Negative.    Respiratory: Negative for cough and shortness of breath.    Cardiovascular: Positive for chest pain (Occasional musculoskeletal).   Gastrointestinal: Positive for constipation. Negative for abdominal pain, diarrhea, nausea and vomiting.   Endocrine: Negative.    Genitourinary: Negative for dyspareunia, frequency, hematuria and urgency.        Gyn as per HPI   Musculoskeletal: Negative for arthralgias.   Skin: Negative for rash.   Neurological: Negative for syncope, weakness and headaches.   Psychiatric/Behavioral: The patient is not nervous/anxious.         Physical Exam:   /70   Pulse 77   Resp 18   Ht 5' 7" (1.702 m)   Wt 64.1 kg (141 lb 6.8 oz)   SpO2 98%   BMI 22.15 kg/m²     Physical Exam  Vitals reviewed. Exam conducted with a chaperone present.   Constitutional:       Appearance: Normal appearance.   HENT:      Head: Normocephalic.   Neck:      Thyroid: No thyroid mass or thyromegaly.   Cardiovascular:      Rate and Rhythm: Normal rate and regular rhythm.   Pulmonary:      Effort: Pulmonary effort is normal.      Breath sounds: Normal breath sounds.   Chest:   Breasts:      Right: Normal. No mass, nipple discharge, skin change, tenderness or axillary adenopathy.      Left: Normal. No mass, nipple discharge, skin change, tenderness or axillary adenopathy.        Comments: Bilateral breast implants noted  Abdominal: "      Palpations: Abdomen is soft.      Tenderness: There is no abdominal tenderness.      Hernia: No hernia is present.   Genitourinary:     General: Normal vulva.      Pubic Area: No rash.       Vagina: Normal. No lesions.      Cervix: Normal.      Uterus: Normal.       Adnexa: Right adnexa normal and left adnexa normal.        Right: No tenderness or fullness.          Left: No tenderness or fullness.     Musculoskeletal:      Right ankle: No swelling.      Left ankle: No swelling.   Lymphadenopathy:      Cervical: No cervical adenopathy.      Upper Body:      Right upper body: No axillary adenopathy.      Left upper body: No axillary adenopathy.   Skin:     General: Skin is warm and dry.   Neurological:      Mental Status: She is alert.   Psychiatric:         Attention and Perception: Attention normal.         Mood and Affect: Mood normal.          Recent Laboratory Results:    Pap smear from 03/18/2020:  Atypical squamous cells of undetermined significance.  HPV negative.    Laboratory evaluation from 03/19/2020:  TSH was 2 estradiol 23 total testosterone 19 and free testosterone 1.3      Assessment:   The primary encounter diagnosis was Menopause. Diagnoses of Encounter for screening mammogram for malignant neoplasm of breast, Screening for malignant neoplasm of cervix, Screening for colon cancer, Well woman exam with routine gynecological exam, Urge incontinence of urine, Hair loss, and Chest pain, musculoskeletal were also pertinent to this visit.       Plan:   Menopause  -     Follicle Stimulating Hormone; Future; Expected date: 04/28/2022  -     Luteinizing Hormone; Future; Expected date: 04/28/2022  -     Estradiol; Future; Expected date: 04/28/2022    Encounter for screening mammogram for malignant neoplasm of breast  -     Mammo Digital Screening Bilat w/ Hayden; Future; Expected date: 04/28/2022    Screening for malignant neoplasm of cervix  -     Liquid-Based Pap Smear, Screening  -     HPV High Risk  Genotypes, PCR    Screening for colon cancer  -     Ambulatory referral/consult to Gastroenterology; Future; Expected date: 05/05/2022    Well woman exam with routine gynecological exam    Urge incontinence of urine  -     Urine culture; Future    Hair loss  -     Comprehensive Metabolic Panel; Future; Expected date: 04/28/2022  -     DHEA; Future; Expected date: 04/28/2022  -     Hemoglobin A1C; Future; Expected date: 04/28/2022  -     Testosterone, Free & Total; Future; Expected date: 04/28/2022  -     TSH; Future; Expected date: 04/28/2022  -     T4, Free; Future; Expected date: 04/28/2022  -     T3; Future; Expected date: 04/28/2022    Chest pain, musculoskeletal  -     cyclobenzaprine (FLEXERIL) 5 MG tablet; Take 1 tablet (5 mg total) by mouth 3 (three) times daily as needed for Muscle spasms.  Dispense: 30 tablet; Refill: 0       Follow up in about 2 weeks (around 5/12/2022) for Follow-up on today's evaluation.     The above was reviewed and discussed with the patient.    Annual exam and screening issues based on the patient's age and family history were discussed.    At this time will proceed as follows:  A refill on cyclobenzaprine was given.  The pros, cons, risks, benefits, alternatives and indications of the medication(s) prescribed, as well as appropriate use and potential side effects were discussed.  Lab work to evaluate for both menopause and hair loss has been ordered.  Probable referral to Dermatology discussed.  Findings on pelvic exam were discussed initially evaluate the patient's issues with urinary urgency the a urine culture.    The patient's questions were answered, and she is in agreement with the current plan.

## 2022-04-29 LAB
ESTRADIOL SERPL-MCNC: <10 PG/ML
FSH SERPL-ACNC: 127.64 MIU/ML
LH SERPL-ACNC: 39.4 MIU/ML

## 2022-05-11 ENCOUNTER — HOSPITAL ENCOUNTER (OUTPATIENT)
Dept: RADIOLOGY | Facility: HOSPITAL | Age: 52
Discharge: HOME | End: 2022-05-11
Attending: OBSTETRICS & GYNECOLOGY
Payer: COMMERCIAL

## 2022-05-11 DIAGNOSIS — Z12.31 ENCOUNTER FOR SCREENING MAMMOGRAM FOR MALIGNANT NEOPLASM OF BREAST: ICD-10-CM

## 2022-05-11 LAB — DHEA SERPL-MCNC: 1.75 NG/ML (ref 0.63–4.7)

## 2022-05-11 PROCEDURE — 77067 SCR MAMMO BI INCL CAD: CPT | Mod: TC,PO

## 2022-05-11 PROCEDURE — 77063 BREAST TOMOSYNTHESIS BI: CPT | Mod: TC,PO

## 2022-05-17 ENCOUNTER — OFFICE VISIT (OUTPATIENT)
Dept: OBSTETRICS AND GYNECOLOGY | Facility: CLINIC | Age: 52
End: 2022-05-17
Payer: COMMERCIAL

## 2022-05-17 ENCOUNTER — LAB VISIT (OUTPATIENT)
Dept: LAB | Facility: HOSPITAL | Age: 52
End: 2022-05-17
Attending: OBSTETRICS & GYNECOLOGY
Payer: COMMERCIAL

## 2022-05-17 VITALS
DIASTOLIC BLOOD PRESSURE: 58 MMHG | HEART RATE: 66 BPM | BODY MASS INDEX: 22.3 KG/M2 | OXYGEN SATURATION: 98 % | WEIGHT: 142.06 LBS | RESPIRATION RATE: 18 BRPM | SYSTOLIC BLOOD PRESSURE: 98 MMHG | HEIGHT: 67 IN

## 2022-05-17 DIAGNOSIS — Z78.0 MENOPAUSE: Primary | ICD-10-CM

## 2022-05-17 DIAGNOSIS — L65.9 HAIR LOSS: ICD-10-CM

## 2022-05-17 LAB
T3 SERPL-MCNC: 131 NG/DL (ref 60–180)
T3 SERPL-MCNC: 131 NG/DL (ref 60–180)
T4 FREE SERPL-MCNC: 0.88 NG/DL (ref 0.71–1.51)
T4 FREE SERPL-MCNC: 0.88 NG/DL (ref 0.71–1.51)
TSH SERPL DL<=0.005 MIU/L-ACNC: 3.2 UIU/ML (ref 0.4–4)
TSH SERPL DL<=0.005 MIU/L-ACNC: 3.2 UIU/ML (ref 0.4–4)

## 2022-05-17 PROCEDURE — 99214 PR OFFICE/OUTPT VISIT, EST, LEVL IV, 30-39 MIN: ICD-10-PCS | Mod: S$GLB,,, | Performed by: OBSTETRICS & GYNECOLOGY

## 2022-05-17 PROCEDURE — 3008F PR BODY MASS INDEX (BMI) DOCUMENTED: ICD-10-PCS | Mod: CPTII,S$GLB,, | Performed by: OBSTETRICS & GYNECOLOGY

## 2022-05-17 PROCEDURE — 82040 ASSAY OF SERUM ALBUMIN: CPT | Performed by: OBSTETRICS & GYNECOLOGY

## 2022-05-17 PROCEDURE — 3044F HG A1C LEVEL LT 7.0%: CPT | Mod: CPTII,S$GLB,, | Performed by: OBSTETRICS & GYNECOLOGY

## 2022-05-17 PROCEDURE — 3074F SYST BP LT 130 MM HG: CPT | Mod: CPTII,S$GLB,, | Performed by: OBSTETRICS & GYNECOLOGY

## 2022-05-17 PROCEDURE — 3078F PR MOST RECENT DIASTOLIC BLOOD PRESSURE < 80 MM HG: ICD-10-PCS | Mod: CPTII,S$GLB,, | Performed by: OBSTETRICS & GYNECOLOGY

## 2022-05-17 PROCEDURE — 84439 ASSAY OF FREE THYROXINE: CPT | Performed by: OBSTETRICS & GYNECOLOGY

## 2022-05-17 PROCEDURE — 3044F PR MOST RECENT HEMOGLOBIN A1C LEVEL <7.0%: ICD-10-PCS | Mod: CPTII,S$GLB,, | Performed by: OBSTETRICS & GYNECOLOGY

## 2022-05-17 PROCEDURE — 36415 COLL VENOUS BLD VENIPUNCTURE: CPT | Performed by: OBSTETRICS & GYNECOLOGY

## 2022-05-17 PROCEDURE — 99999 PR PBB SHADOW E&M-EST. PATIENT-LVL III: ICD-10-PCS | Mod: PBBFAC,,, | Performed by: OBSTETRICS & GYNECOLOGY

## 2022-05-17 PROCEDURE — 99999 PR PBB SHADOW E&M-EST. PATIENT-LVL III: CPT | Mod: PBBFAC,,, | Performed by: OBSTETRICS & GYNECOLOGY

## 2022-05-17 PROCEDURE — 84480 ASSAY TRIIODOTHYRONINE (T3): CPT | Performed by: OBSTETRICS & GYNECOLOGY

## 2022-05-17 PROCEDURE — 3078F DIAST BP <80 MM HG: CPT | Mod: CPTII,S$GLB,, | Performed by: OBSTETRICS & GYNECOLOGY

## 2022-05-17 PROCEDURE — 3074F PR MOST RECENT SYSTOLIC BLOOD PRESSURE < 130 MM HG: ICD-10-PCS | Mod: CPTII,S$GLB,, | Performed by: OBSTETRICS & GYNECOLOGY

## 2022-05-17 PROCEDURE — 3008F BODY MASS INDEX DOCD: CPT | Mod: CPTII,S$GLB,, | Performed by: OBSTETRICS & GYNECOLOGY

## 2022-05-17 PROCEDURE — 84443 ASSAY THYROID STIM HORMONE: CPT | Performed by: OBSTETRICS & GYNECOLOGY

## 2022-05-17 PROCEDURE — 99214 OFFICE O/P EST MOD 30 MIN: CPT | Mod: S$GLB,,, | Performed by: OBSTETRICS & GYNECOLOGY

## 2022-05-17 RX ORDER — PROGESTERONE 100 MG/1
100 CAPSULE ORAL NIGHTLY
Qty: 90 CAPSULE | Refills: 3 | Status: SHIPPED | OUTPATIENT
Start: 2022-05-17 | End: 2023-07-25 | Stop reason: SDUPTHER

## 2022-05-17 RX ORDER — ESTRADIOL 1 MG/1
1 TABLET ORAL DAILY
Qty: 30 TABLET | Refills: 11 | Status: SHIPPED | OUTPATIENT
Start: 2022-05-17 | End: 2023-07-25 | Stop reason: SDUPTHER

## 2022-05-17 NOTE — PROGRESS NOTES
Chief Complaint   Patient presents with    Follow up for results       History and Physical:  No LMP recorded. Patient has had an ablation.    2022   Kamilla Montes is a 51 y.o.  who presents today for her routine annual GYN exam.     She reports an approximately 7 year history of hair loss including her eyebrows.  She states that lab work was tested in the past but workup was negative.  She has not been seen by Dermatology.    She has a history of bladder sling but has recently been noting some increasing issues with urinary urgency.  No hematuria or dysuria.    In the past she was diagnosed with musculoskeletal chest pain and occasionally uses cyclobenzaprine for this issue on requests a refill.    For menopausal standpoint she does note some worsening recent hot flashes.    2022  51-YO female presents as a follow up to discuss lab results. Her thyroid and testosterone levels were not drawn (while the patient was present the lab was contacted in regards to the previously ordered thyroid and testosterone testing).   Other labs confirmed menopause. She reports hot flashes which have not changed since her previous visit.     Allergies: Review of patient's allergies indicates:  No Known Allergies    Past Medical History:   Diagnosis Date    Chest pain, musculoskeletal        Past Surgical History:   Procedure Laterality Date    AUGMENTATION OF BREAST Bilateral     BLADDER SUSPENSION      ESOPHAGOGASTRODUODENOSCOPY      NovSkyBitz         [facility]:   Current Outpatient Medications:     estradioL (ESTRACE) 1 MG tablet, Take 1 tablet (1 mg total) by mouth once daily., Disp: 30 tablet, Rfl: 11    progesterone (PROMETRIUM) 100 MG capsule, Take 1 capsule (100 mg total) by mouth nightly., Disp: 90 capsule, Rfl: 3     OB History        2    Para   2    Term   2            AB        Living   2       SAB        IAB        Ectopic        Multiple        Live Births               Obstetric  "Comments   Vaginal delivery x2             Social History     Socioeconomic History    Marital status:    Tobacco Use    Smoking status: Never Smoker    Smokeless tobacco: Never Used   Substance and Sexual Activity    Alcohol use: Yes     Alcohol/week: 1.0 standard drink     Types: 1 Glasses of wine per week     Comment: socially     Drug use: Never    Sexual activity: Not Currently       Family History   Problem Relation Age of Onset    Diabetes Mother        Past medical and surgical history reviewed.   I have reviewed the patient's medical history in detail and updated the computerized patient record.    Review of System:   Review of Systems   Constitutional: Negative for chills, fever and unexpected weight change.   Eyes: Negative.    Respiratory: Negative for cough and shortness of breath.    Cardiovascular: Positive for chest pain (Occasional musculoskeletal).   Gastrointestinal: Positive for constipation. Negative for abdominal pain, diarrhea, nausea and vomiting.   Endocrine: Negative.    Genitourinary: Negative.         Gyn as per HPI   Skin: Negative for rash.   Neurological: Negative.         Physical Exam:   BP (!) 98/58   Pulse 66   Resp 18   Ht 5' 7" (1.702 m)   Wt 64.4 kg (142 lb 1.4 oz)   SpO2 98%   BMI 22.25 kg/m²     Physical Exam  Constitutional:       Appearance: Normal appearance.   HENT:      Head: Normocephalic.   Neck:      Thyroid: No thyromegaly.   Cardiovascular:      Rate and Rhythm: Normal rate and regular rhythm.   Pulmonary:      Effort: Pulmonary effort is normal.      Breath sounds: Normal breath sounds.   Abdominal:      Palpations: Abdomen is soft.      Tenderness: There is no abdominal tenderness.   Musculoskeletal:      Right ankle: No swelling.      Left ankle: No swelling.   Skin:     General: Skin is warm and dry.   Neurological:      General: No focal deficit present.      Mental Status: She is alert.   Psychiatric:         Attention and Perception: " Attention normal.         Mood and Affect: Mood normal.         Behavior: Behavior is cooperative.          Recent Laboratory Results:    Pap smear from 03/18/2020:  Atypical squamous cells of undetermined significance.  HPV negative.    Laboratory evaluation from 03/19/2020:  TSH was 2 estradiol 23 total testosterone 19 and free testosterone 1.3    4/28/2022: Pap / HPV - Negative    Results    Collected Updated Procedure    04/28/2022 0955 04/29/2022 1235 Estradiol [[id number]]   (Abnormal)   Blood    Component Value Units   Estradiol <10 Abnormal   pg/mL          04/28/2022 0955 04/29/2022 1235 Luteinizing Hormone [[id number]]   Blood    Component Value Units   LH 39.4  mIU/mL          04/28/2022 0955 04/28/2022 1403 Hemoglobin A1C [[id number]]   Blood    Component Value Units   Hemoglobin A1C 5.4  %   Estimated Avg Glucose 108 mg/dL          04/28/2022 0955 04/29/2022 1337 Follicle Stimulating Hormone [[id number]]   Blood    Component Value Units   Follicle Stimulating Hormone 127.64  mIU/mL          04/28/2022 0955 05/11/2022 2135 DHEA [[id number]]   Blood    Component Value Units   DHEA 1.752  ng/mL          04/28/2022 0955 04/28/2022 1056 Comprehensive Metabolic Panel [[id number]]   Blood    Component Value Units   Sodium 141 mmol/L   Potassium 4.3 mmol/L   Chloride 106 mmol/L   CO2 26 mmol/L   Glucose 97 mg/dL   BUN 17 mg/dL   Creatinine 0.7 mg/dL   Calcium 9.2 mg/dL   Total Protein 7.3 g/dL   Albumin 4.1 g/dL   Total Bilirubin 0.6  mg/dL   Alkaline Phosphatase 71 U/L   AST 20 U/L   ALT 21 U/L   Anion Gap 9 mmol/L   eGFR if African American >60 mL/min/1.73 m^2   eGFR if non African American >60  mL/min/1.73 m^2                    Last LH, FSH, Estradiol, DHEA, A1C, TSH, and Testosterone (If they were resulted)  Follicle Stimulating Hormone   Date Value Ref Range Status   04/28/2022 127.64 See Text mIU/mL Final     Comment:     Female Reference Ranges:  Follicular Phase.................3.03-8.08 mIU/mL  Midcycle  Peak....................2.55-16.69 mIU/mL  Luteal Phase.....................1.38-5.47 mIU/mL  Postmenopausal...................26..41 mIU/mL  Male Reference Range:............0.95-11.95 mIU/mL       Estradiol   Date Value Ref Range Status   04/28/2022 <10 (A) See Text pg/mL Final     Comment:     Estradiol Reference Ranges (Female):  Follicular phase:  pg/mL  Midcycle:          pg/mL  Luteal phase:      pg/mL  Post-menopausal(Not on HRT): <10-28 pg/mL  Post-menopausal(On HRT): < pg/mL  Males: 11-44 pg/mL    The drug Fulvestrant (Faslodex) may interfere with the   assay leading to falsely elevated Estradiol results.    Patients treated with Mifepristone should not be tested with  the  or Alinity I Estradiol assay for up to two   weeks due to the interference of the drug in this assay.       DHEA   Date Value Ref Range Status   04/28/2022 1.752 0.630 - 4.700 ng/mL Final     Comment:     INTERPRETIVE INFORMATION: Dehydroepiandrosterone, Females   18 years and older:  Postmenopausal: 0.60-5.73 ng/mL  REFERENCE INTERVAL: Dehydroepiandrosterone by TMS  Access complete set of age- and/or gender-specific   reference intervals for this test in the [facility]   Test Directory ([employer]).  This test was developed and its performance characteristics   determined by [facility]. It has not been cleared or   approved by the US Food and Drug Administration. This test   was performed in a CLIA certified laboratory and is   intended for clinical purposes.  Performed By: [facility]  [address] [place] [id number]  : Mable Traylor MD       Hemoglobin A1C   Date Value Ref Range Status   04/28/2022 5.4 4.0 - 5.6 % Final     Comment:     ADA Screening Guidelines:  5.7-6.4%  Consistent with prediabetes  >or=6.5%  Consistent with diabetes    High levels of fetal hemoglobin interfere with the HbA1C  assay. Heterozygous hemoglobin variants (HbS, HgC,  etc)do  not significantly interfere with this assay.   However, presence of multiple variants may affect accuracy.       TSH   Date Value Ref Range Status   02/17/2020 1.710 0.400 - 4.000 uIU/mL Final         Last CMP    Sodium   Date Value Ref Range Status   04/28/2022 141 136 - 145 mmol/L Final     Potassium   Date Value Ref Range Status   04/28/2022 4.3 3.5 - 5.1 mmol/L Final     Chloride   Date Value Ref Range Status   04/28/2022 106 95 - 110 mmol/L Final     Calcium   Date Value Ref Range Status   04/28/2022 9.2 8.7 - 10.5 mg/dL Final     CO2   Date Value Ref Range Status   04/28/2022 26 23 - 29 mmol/L Final     Glucose   Date Value Ref Range Status   04/28/2022 97 70 - 110 mg/dL Final     BUN   Date Value Ref Range Status   04/28/2022 17 6 - 20 mg/dL Final     Creatinine   Date Value Ref Range Status   04/28/2022 0.7 0.5 - 1.4 mg/dL Final     Anion Gap   Date Value Ref Range Status   04/28/2022 9 8 - 16 mmol/L Final     eGFR if    Date Value Ref Range Status   04/28/2022 >60 >60 mL/min/1.73 m^2 Final     eGFR if non    Date Value Ref Range Status   04/28/2022 >60 >60 mL/min/1.73 m^2 Final     Comment:     Calculation used to obtain the estimated glomerular filtration  rate (eGFR) is the CKD-EPI equation.        Total Protein   Date Value Ref Range Status   04/28/2022 7.3 6.0 - 8.4 g/dL Final     Albumin   Date Value Ref Range Status   04/28/2022 4.1 3.5 - 5.2 g/dL Final     Total Bilirubin   Date Value Ref Range Status   04/28/2022 0.6 0.1 - 1.0 mg/dL Final     Comment:     For infants and newborns, interpretation of results should be based  on gestational age, weight and in agreement with clinical  observations.    Premature Infant recommended reference ranges:  Up to 24 hours.............<8.0 mg/dL  Up to 48 hours............<12.0 mg/dL  3-5 days..................<15.0 mg/dL  6-29 days.................<15.0 mg/dL       Alkaline Phosphatase   Date Value Ref Range Status    04/28/2022 71 55 - 135 U/L Final     ALT   Date Value Ref Range Status   04/28/2022 21 10 - 44 U/L Final     AST   Date Value Ref Range Status   04/28/2022 20 10 - 40 U/L Final     TSH   Date Value Ref Range Status   02/17/2020 1.710 0.400 - 4.000 uIU/mL Final         Assessment:   The primary encounter diagnosis was Menopause. A diagnosis of Hair loss was also pertinent to this visit.       Plan:   Menopause  -     estradioL (ESTRACE) 1 MG tablet; Take 1 tablet (1 mg total) by mouth once daily.  Dispense: 30 tablet; Refill: 11  -     progesterone (PROMETRIUM) 100 MG capsule; Take 1 capsule (100 mg total) by mouth nightly.  Dispense: 90 capsule; Refill: 3    Hair loss  -     Ambulatory referral/consult to Dermatology; Future; Expected date: 05/24/2022  -     TESTOSTERONE PANEL; Future; Expected date: 05/17/2022  -     TSH; Future; Expected date: 05/17/2022  -     T4, Free; Future; Expected date: 05/17/2022  -     T3; Future; Expected date: 05/17/2022       Follow up in about 3 months (around 8/17/2022) for Follow-up on today's evaluation.     The above was reviewed and discussed with the patient.      At this time will proceed as follows:  Since lab results confirmed menopause, discussed the pros, cons, risks, benefits and indications for HRT. Shared decision making occurred and the patient would like to start HRT. \  At this time she is being started on oral estrogen and oral progesterone.  The pros, cons, risks, benefits, alternatives and indications of the medication(s) prescribed, as well as appropriate use and potential side effects were discussed.    Labs for thyroid and testosterone were ordered and she agreed to get labs today.   Referral to dermatology has been placed for hair loss.   Urine culture showed no growth.    The patient's questions were answered, and she is in agreement with the current plan.

## 2022-05-23 ENCOUNTER — PATIENT MESSAGE (OUTPATIENT)
Dept: DERMATOLOGY | Facility: CLINIC | Age: 52
End: 2022-05-23
Payer: COMMERCIAL

## 2022-05-23 LAB
ALBUMIN SERPL-MCNC: 4.4 G/DL (ref 3.6–5.1)
SHBG SERPL-SCNC: 46 NMOL/L (ref 17–124)
TESTOST FREE SERPL-MCNC: 1.4 PG/ML (ref 0.2–5)
TESTOST SERPL-MCNC: 16 NG/DL (ref 2–45)
TESTOSTERONE.FREE+WB SERPL-MCNC: 2.9 NG/DL (ref 0.5–8.5)

## 2022-06-03 ENCOUNTER — PATIENT MESSAGE (OUTPATIENT)
Dept: ENDOSCOPY | Facility: HOSPITAL | Age: 52
End: 2022-06-03
Payer: COMMERCIAL

## 2022-06-04 ENCOUNTER — PATIENT MESSAGE (OUTPATIENT)
Dept: ENDOSCOPY | Facility: HOSPITAL | Age: 52
End: 2022-06-04
Payer: COMMERCIAL

## 2022-06-06 ENCOUNTER — PATIENT MESSAGE (OUTPATIENT)
Dept: ENDOSCOPY | Facility: HOSPITAL | Age: 52
End: 2022-06-06
Payer: COMMERCIAL

## 2022-06-13 ENCOUNTER — ANESTHESIA EVENT (OUTPATIENT)
Dept: ENDOSCOPY | Facility: HOSPITAL | Age: 52
End: 2022-06-13
Payer: COMMERCIAL

## 2022-06-13 ENCOUNTER — HOSPITAL ENCOUNTER (OUTPATIENT)
Facility: HOSPITAL | Age: 52
Discharge: HOME | End: 2022-06-13
Attending: INTERNAL MEDICINE | Admitting: INTERNAL MEDICINE
Payer: COMMERCIAL

## 2022-06-13 ENCOUNTER — ANESTHESIA (OUTPATIENT)
Dept: ENDOSCOPY | Facility: HOSPITAL | Age: 52
End: 2022-06-13
Payer: COMMERCIAL

## 2022-06-13 VITALS
HEART RATE: 60 BPM | TEMPERATURE: 98 F | SYSTOLIC BLOOD PRESSURE: 110 MMHG | BODY MASS INDEX: 21.19 KG/M2 | WEIGHT: 135 LBS | OXYGEN SATURATION: 100 % | DIASTOLIC BLOOD PRESSURE: 69 MMHG | HEIGHT: 67 IN | RESPIRATION RATE: 14 BRPM

## 2022-06-13 DIAGNOSIS — Z12.11 COLON CANCER SCREENING: ICD-10-CM

## 2022-06-13 DIAGNOSIS — K64.8 INTERNAL HEMORRHOIDS: ICD-10-CM

## 2022-06-13 DIAGNOSIS — K63.5 POLYP OF COLON, UNSPECIFIED PART OF COLON, UNSPECIFIED TYPE: Primary | ICD-10-CM

## 2022-06-13 PROCEDURE — D9220A PRA ANESTHESIA: ICD-10-PCS | Mod: 33,CRNA,, | Performed by: NURSE ANESTHETIST, CERTIFIED REGISTERED

## 2022-06-13 PROCEDURE — 27201012 HC FORCEPS, HOT/COLD, DISP: Performed by: INTERNAL MEDICINE

## 2022-06-13 PROCEDURE — 25000003 PHARM REV CODE 250: Performed by: NURSE ANESTHETIST, CERTIFIED REGISTERED

## 2022-06-13 PROCEDURE — 88305 TISSUE EXAM BY PATHOLOGIST: CPT | Mod: 26,,, | Performed by: PATHOLOGY

## 2022-06-13 PROCEDURE — 45380 PR COLONOSCOPY,BIOPSY: ICD-10-PCS | Mod: ,,, | Performed by: INTERNAL MEDICINE

## 2022-06-13 PROCEDURE — D9220A PRA ANESTHESIA: Mod: 33,ANES,, | Performed by: ANESTHESIOLOGY

## 2022-06-13 PROCEDURE — 88305 TISSUE EXAM BY PATHOLOGIST: CPT | Performed by: PATHOLOGY

## 2022-06-13 PROCEDURE — D9220A PRA ANESTHESIA: Mod: 33,CRNA,, | Performed by: NURSE ANESTHETIST, CERTIFIED REGISTERED

## 2022-06-13 PROCEDURE — D9220A PRA ANESTHESIA: ICD-10-PCS | Mod: 33,ANES,, | Performed by: ANESTHESIOLOGY

## 2022-06-13 PROCEDURE — 63600175 PHARM REV CODE 636 W HCPCS: Performed by: NURSE ANESTHETIST, CERTIFIED REGISTERED

## 2022-06-13 PROCEDURE — 88305 TISSUE EXAM BY PATHOLOGIST: ICD-10-PCS | Mod: 26,,, | Performed by: PATHOLOGY

## 2022-06-13 PROCEDURE — 37000008 HC ANESTHESIA 1ST 15 MINUTES: Performed by: INTERNAL MEDICINE

## 2022-06-13 PROCEDURE — 45380 COLONOSCOPY AND BIOPSY: CPT | Mod: ,,, | Performed by: INTERNAL MEDICINE

## 2022-06-13 PROCEDURE — 37000009 HC ANESTHESIA EA ADD 15 MINS: Performed by: INTERNAL MEDICINE

## 2022-06-13 PROCEDURE — 45380 COLONOSCOPY AND BIOPSY: CPT | Mod: PT | Performed by: INTERNAL MEDICINE

## 2022-06-13 PROCEDURE — 25000003 PHARM REV CODE 250: Performed by: INTERNAL MEDICINE

## 2022-06-13 RX ORDER — PHENYLEPHRINE HYDROCHLORIDE 10 MG/ML
INJECTION INTRAVENOUS
Status: DISCONTINUED | OUTPATIENT
Start: 2022-06-13 | End: 2022-06-13

## 2022-06-13 RX ORDER — PROPOFOL 10 MG/ML
VIAL (ML) INTRAVENOUS
Status: DISCONTINUED | OUTPATIENT
Start: 2022-06-13 | End: 2022-06-13

## 2022-06-13 RX ORDER — LIDOCAINE HYDROCHLORIDE 20 MG/ML
INJECTION INTRAVENOUS
Status: DISCONTINUED | OUTPATIENT
Start: 2022-06-13 | End: 2022-06-13

## 2022-06-13 RX ORDER — SODIUM CHLORIDE 9 MG/ML
INJECTION, SOLUTION INTRAVENOUS CONTINUOUS
Status: DISCONTINUED | OUTPATIENT
Start: 2022-06-13 | End: 2022-06-13 | Stop reason: HOSPADM

## 2022-06-13 RX ADMIN — PROPOFOL 50 MG: 10 INJECTION, EMULSION INTRAVENOUS at 10:06

## 2022-06-13 RX ADMIN — PROPOFOL 50 MG: 10 INJECTION, EMULSION INTRAVENOUS at 11:06

## 2022-06-13 RX ADMIN — PROPOFOL 100 MG: 10 INJECTION, EMULSION INTRAVENOUS at 10:06

## 2022-06-13 RX ADMIN — LIDOCAINE HYDROCHLORIDE 50 MG: 20 INJECTION, SOLUTION INTRAVENOUS at 10:06

## 2022-06-13 RX ADMIN — SODIUM CHLORIDE: 0.9 INJECTION, SOLUTION INTRAVENOUS at 09:06

## 2022-06-13 RX ADMIN — PHENYLEPHRINE HYDROCHLORIDE 100 MCG: 10 INJECTION INTRAVENOUS at 10:06

## 2022-06-13 NOTE — PLAN OF CARE
Patient awake and alert. Dr. Espinoza at bedside speaking with patient. Discharge instructions reviewed with patient and family. Verbalized understandings. Patient transported to vehicle via wheelchair.

## 2022-06-13 NOTE — ANESTHESIA POSTPROCEDURE EVALUATION
Anesthesia Post Evaluation    Patient: Kamilla Montes    Procedure(s) Performed: Procedure(s) (LRB):  COLONOSCOPY (N/A)    Final Anesthesia Type: general      Patient location during evaluation: PACU  Patient participation: Yes- Able to Participate  Level of consciousness: awake and alert  Post-procedure vital signs: reviewed and stable  Pain management: adequate  Airway patency: patent    PONV status at discharge: No PONV  Anesthetic complications: no      Cardiovascular status: blood pressure returned to baseline  Respiratory status: unassisted  Hydration status: euvolemic  Follow-up not needed.          Vitals Value Taken Time   /69 06/13/22 1140   Temp na 06/13/22 1324   Pulse 60 06/13/22 1140   Resp 14 06/13/22 1140   SpO2 100 % 06/13/22 1140         Event Time   Out of Recovery 11:54:23         Pain/Jayden Score: No data recorded

## 2022-06-13 NOTE — ANESTHESIA PREPROCEDURE EVALUATION
06/13/2022  Kamilla Montes is a 51 y.o., female.      Pre-op Assessment    I have reviewed the Patient Summary Reports.     I have reviewed the Nursing Notes. I have reviewed the NPO Status.   I have reviewed the Medications.     Review of Systems  Anesthesia Hx:  Denies Family Hx of Anesthesia complications.   Denies Personal Hx of Anesthesia complications.   Hepatic/GI:   Bowel Prep.        Physical Exam  General: Cooperative, Alert, Oriented and Well nourished    Airway:  Mallampati: II   Mouth Opening: Normal  Neck ROM: Normal ROM        Anesthesia Plan  Type of Anesthesia, risks & benefits discussed:    Anesthesia Type: Gen Natural Airway  Intra-op Monitoring Plan: Standard ASA Monitors  Induction:  IV  Informed Consent: Informed consent signed with the Patient and all parties understand the risks and agree with anesthesia plan.  All questions answered.   ASA Score: 1    Ready For Surgery From Anesthesia Perspective.     .

## 2022-06-13 NOTE — TRANSFER OF CARE
"Anesthesia Transfer of Care Note    Patient: Kamilla Montes    Procedure(s) Performed: Procedure(s) (LRB):  COLONOSCOPY (N/A)    Patient location: GI    Anesthesia Type: general    Transport from OR: Transported from OR on room air with adequate spontaneous ventilation    Post pain: adequate analgesia    Post assessment: no apparent anesthetic complications    Post vital signs: stable    Level of consciousness: sedated    Nausea/Vomiting: no nausea/vomiting    Complications: none    Transfer of care protocol was followed      Last vitals:   Visit Vitals  /66 (BP Location: Left arm, Patient Position: Lying)   Pulse 63   Temp 36.8 °C (98.2 °F) (Skin)   Resp 20   Ht 5' 7" (1.702 m)   Wt 61.2 kg (135 lb)   SpO2 99%   Breastfeeding No   BMI 21.14 kg/m²     "

## 2022-06-13 NOTE — PROVATION PATIENT INSTRUCTIONS
Discharge Summary/Instructions after an Endoscopic Procedure  Patient Name: Kamilla Montes  Patient MRN: [id number]  Patient YOB: 1970 Monday, June 13, 2022  Santiago Garcia MD  Dear patient,  As a result of recent federal legislation (The Federal Cures Act), you may   receive lab or pathology results from your procedure in your [facility]   account before your physician is able to contact you. Your physician or   their representative will relay the results to you with their   recommendations at their soonest availability.  Thank you,  RESTRICTIONS:  During your procedure today, you received medications for sedation.  These   medications may affect your judgment, balance and coordination.  Therefore,   for 24 hours, you have the following restrictions:   - DO NOT drive a car, operate machinery, make legal/financial decisions,   sign important papers or drink alcohol.    ACTIVITY:  Today: no heavy lifting, straining or running due to procedural   sedation/anesthesia.  The following day: return to full activity including work.  DIET:  Eat and drink normally unless instructed otherwise.     TREATMENT FOR COMMON SIDE EFFECTS:  - Mild abdominal pain, nausea, belching, bloating or excessive gas:  rest,   eat lightly and use a heating pad.  - Sore Throat: treat with throat lozenges and/or gargle with warm salt   water.  - Because air was used during the procedure, expelling large amounts of air   from your rectum or belching is normal.  - If a bowel prep was taken, you may not have a bowel movement for 1-3 days.    This is normal.  SYMPTOMS TO WATCH FOR AND REPORT TO YOUR PHYSICIAN:  1. Abdominal pain or bloating, other than gas cramps.  2. Chest pain.  3. Back pain.  4. Signs of infection such as: chills or fever occurring within 24 hours   after the procedure.  5. Rectal bleeding, which would show as bright red, maroon, or black stools.   (A tablespoon of blood from the rectum is not serious, especially if    hemorrhoids are present.)  6. Vomiting.  7. Weakness or dizziness.  GO DIRECTLY TO THE NEAREST EMERGENCY ROOM IF YOU HAVE ANY OF THE FOLLOWING:      Difficulty breathing              Chills and/or fever over 101 F   Persistent vomiting and/or vomiting blood   Severe abdominal pain   Severe chest pain   Black, tarry stools   Bleeding- more than one tablespoon   Any other symptom or condition that you feel may need urgent attention  Your doctor recommends these additional instructions:  If any biopsies were taken, your doctors clinic will contact you in 1 to 2   weeks with any results.  - Patient has a contact number available for emergencies.  The signs and   symptoms of potential delayed complications were discussed with the   patient.  Return to normal activities tomorrow.  Written discharge   instructions were provided to the patient.   - High fiber diet.   - Continue present medications.   - Await pathology results.   - Repeat colonoscopy in 5 years for surveillance.   - Discharge patient to home (ambulatory).   - Return to my office PRN.  For questions, problems or results please call your physician - Santiago Garcia MD at Work:  [phone].  [facility] EM[facility]ENC[facility] RO[facility] PHONE NUMBER: [phone]  IF A COMPLICATION OR EMERGENCY SITUATION ARISES AND YOU ARE UNABLE TO REACH   YOUR PHYSICIAN - GO DIRECTLY TO THE EMERGENCY ROOM.  Santiago Garcia MD  6/13/2022 11:08:10 AM  This report has been verified and signed electronically.  Dear patient,  As a result of recent federal legislation (The Federal Cures Act), you may   receive lab or pathology results from your procedure in your [facility]   account before your physician is able to contact you. Your physician or   their representative will relay the results to you with their   recommendations at their soonest availability.  Thank you,  PROVATION

## 2022-06-21 LAB
FINAL PATHOLOGIC DIAGNOSIS: NORMAL
GROSS: NORMAL
Lab: NORMAL

## 2022-06-21 NOTE — PROGRESS NOTES
Please advise patient that polyp pathology was reviewed and is benign and is the hyperplastic type which is not precancerous/risk factor for cancer.  Repeat colonoscopy recommended in 10 years, not 5 as previously thought.  Place reminder in system for repeat colonoscopy.

## 2022-07-07 ENCOUNTER — OFFICE VISIT (OUTPATIENT)
Dept: DERMATOLOGY | Facility: CLINIC | Age: 52
End: 2022-07-07
Payer: COMMERCIAL

## 2022-07-07 ENCOUNTER — LAB VISIT (OUTPATIENT)
Dept: LAB | Facility: HOSPITAL | Age: 52
End: 2022-07-07
Attending: DERMATOLOGY
Payer: COMMERCIAL

## 2022-07-07 VITALS — WEIGHT: 135 LBS | BODY MASS INDEX: 21.19 KG/M2 | HEIGHT: 67 IN

## 2022-07-07 DIAGNOSIS — L66.1 FRONTAL FIBROSING ALOPECIA: Primary | ICD-10-CM

## 2022-07-07 DIAGNOSIS — L66.1 FRONTAL FIBROSING ALOPECIA: ICD-10-CM

## 2022-07-07 DIAGNOSIS — L65.9 HAIR LOSS: ICD-10-CM

## 2022-07-07 LAB
BASOPHILS # BLD AUTO: 0.04 K/UL (ref 0–0.2)
BASOPHILS NFR BLD: 0.8 % (ref 0–1.9)
DIFFERENTIAL METHOD: NORMAL
EOSINOPHIL # BLD AUTO: 0.1 K/UL (ref 0–0.5)
EOSINOPHIL NFR BLD: 2.3 % (ref 0–8)
ERYTHROCYTE [DISTWIDTH] IN BLOOD BY AUTOMATED COUNT: 13.8 % (ref 11.5–14.5)
FERRITIN SERPL-MCNC: 15 NG/ML (ref 20–300)
HCT VFR BLD AUTO: 38.1 % (ref 37–48.5)
HGB BLD-MCNC: 12.2 G/DL (ref 12–16)
IMM GRANULOCYTES # BLD AUTO: 0 K/UL (ref 0–0.04)
IMM GRANULOCYTES NFR BLD AUTO: 0 % (ref 0–0.5)
IRON SERPL-MCNC: 48 UG/DL (ref 30–160)
LYMPHOCYTES # BLD AUTO: 1.7 K/UL (ref 1–4.8)
LYMPHOCYTES NFR BLD: 35.9 % (ref 18–48)
MCH RBC QN AUTO: 29.5 PG (ref 27–31)
MCHC RBC AUTO-ENTMCNC: 32 G/DL (ref 32–36)
MCV RBC AUTO: 92 FL (ref 82–98)
MONOCYTES # BLD AUTO: 0.4 K/UL (ref 0.3–1)
MONOCYTES NFR BLD: 7.8 % (ref 4–15)
NEUTROPHILS # BLD AUTO: 2.5 K/UL (ref 1.8–7.7)
NEUTROPHILS NFR BLD: 53.2 % (ref 38–73)
NRBC BLD-RTO: 0 /100 WBC
PLATELET # BLD AUTO: 328 K/UL (ref 150–450)
PMV BLD AUTO: 9.3 FL (ref 9.2–12.9)
RBC # BLD AUTO: 4.13 M/UL (ref 4–5.4)
SATURATED IRON: 10 % (ref 20–50)
TOTAL IRON BINDING CAPACITY: 478 UG/DL (ref 250–450)
TRANSFERRIN SERPL-MCNC: 323 MG/DL (ref 200–375)
WBC # BLD AUTO: 4.74 K/UL (ref 3.9–12.7)

## 2022-07-07 PROCEDURE — 99214 OFFICE O/P EST MOD 30 MIN: CPT | Mod: 25,S$GLB,, | Performed by: DERMATOLOGY

## 2022-07-07 PROCEDURE — 99999 PR PBB SHADOW E&M-EST. PATIENT-LVL III: CPT | Mod: PBBFAC,,, | Performed by: DERMATOLOGY

## 2022-07-07 PROCEDURE — 84466 ASSAY OF TRANSFERRIN: CPT | Performed by: DERMATOLOGY

## 2022-07-07 PROCEDURE — 1160F RVW MEDS BY RX/DR IN RCRD: CPT | Mod: CPTII,S$GLB,, | Performed by: DERMATOLOGY

## 2022-07-07 PROCEDURE — 99214 PR OFFICE/OUTPT VISIT, EST, LEVL IV, 30-39 MIN: ICD-10-PCS | Mod: 25,S$GLB,, | Performed by: DERMATOLOGY

## 2022-07-07 PROCEDURE — 11900 PR INJECTION INTO SKIN LESIONS, UP TO 7: ICD-10-PCS | Mod: S$GLB,,, | Performed by: DERMATOLOGY

## 2022-07-07 PROCEDURE — 3008F PR BODY MASS INDEX (BMI) DOCUMENTED: ICD-10-PCS | Mod: CPTII,S$GLB,, | Performed by: DERMATOLOGY

## 2022-07-07 PROCEDURE — 3044F PR MOST RECENT HEMOGLOBIN A1C LEVEL <7.0%: ICD-10-PCS | Mod: CPTII,S$GLB,, | Performed by: DERMATOLOGY

## 2022-07-07 PROCEDURE — 11900 INJECT SKIN LESIONS </W 7: CPT | Mod: S$GLB,,, | Performed by: DERMATOLOGY

## 2022-07-07 PROCEDURE — 3044F HG A1C LEVEL LT 7.0%: CPT | Mod: CPTII,S$GLB,, | Performed by: DERMATOLOGY

## 2022-07-07 PROCEDURE — 1160F PR REVIEW ALL MEDS BY PRESCRIBER/CLIN PHARMACIST DOCUMENTED: ICD-10-PCS | Mod: CPTII,S$GLB,, | Performed by: DERMATOLOGY

## 2022-07-07 PROCEDURE — 1159F PR MEDICATION LIST DOCUMENTED IN MEDICAL RECORD: ICD-10-PCS | Mod: CPTII,S$GLB,, | Performed by: DERMATOLOGY

## 2022-07-07 PROCEDURE — 85025 COMPLETE CBC W/AUTO DIFF WBC: CPT | Performed by: DERMATOLOGY

## 2022-07-07 PROCEDURE — 3008F BODY MASS INDEX DOCD: CPT | Mod: CPTII,S$GLB,, | Performed by: DERMATOLOGY

## 2022-07-07 PROCEDURE — 36415 COLL VENOUS BLD VENIPUNCTURE: CPT | Performed by: DERMATOLOGY

## 2022-07-07 PROCEDURE — 1159F MED LIST DOCD IN RCRD: CPT | Mod: CPTII,S$GLB,, | Performed by: DERMATOLOGY

## 2022-07-07 PROCEDURE — 99999 PR PBB SHADOW E&M-EST. PATIENT-LVL III: ICD-10-PCS | Mod: PBBFAC,,, | Performed by: DERMATOLOGY

## 2022-07-07 PROCEDURE — 82728 ASSAY OF FERRITIN: CPT | Performed by: DERMATOLOGY

## 2022-07-07 RX ORDER — DOXYCYCLINE 100 MG/1
TABLET ORAL
Qty: 30 TABLET | Refills: 2 | Status: SHIPPED | OUTPATIENT
Start: 2022-07-07 | End: 2022-12-30

## 2022-07-07 RX ORDER — BIMATOPROST 3 UG/ML
SOLUTION TOPICAL
Qty: 5 ML | Refills: 12 | Status: SHIPPED | OUTPATIENT
Start: 2022-07-07 | End: 2023-10-27 | Stop reason: SDUPTHER

## 2022-07-07 RX ORDER — MINOXIDIL 50 MG/G
AEROSOL, FOAM TOPICAL
Qty: 60 G | Refills: 11 | Status: SHIPPED | OUTPATIENT
Start: 2022-07-07

## 2022-07-07 RX ORDER — TACROLIMUS 1 MG/G
OINTMENT TOPICAL
Qty: 100 G | Refills: 5 | Status: SHIPPED | OUTPATIENT
Start: 2022-07-07

## 2022-07-07 RX ORDER — BIMATOPROST 3 UG/ML
SOLUTION TOPICAL
Qty: 5 ML | Refills: 12 | Status: SHIPPED | OUTPATIENT
Start: 2022-07-07 | End: 2022-07-07 | Stop reason: SDUPTHER

## 2022-07-07 RX ORDER — FINASTERIDE 5 MG/1
5 TABLET, FILM COATED ORAL DAILY
Qty: 30 TABLET | Refills: 11 | Status: SHIPPED | OUTPATIENT
Start: 2022-07-07 | End: 2023-06-29 | Stop reason: SDUPTHER

## 2022-07-07 NOTE — PROGRESS NOTES
"  Subjective:       Patient ID:  Kamilla Montes is a 51 y.o. female who presents for   Chief Complaint   Patient presents with    Hair Loss     LOV- 2/17/20- [facility]- hair loss, discussed biopsy to establish dx before treating, COVID pandemic happened, lost to FU    Here today for hair loss x 5 years  Scalp not itchy  Taking OTC products  Growth spray M Moerie daily  Hair skin and nails gummies  Biotin 10,000 IU  R&F serum for eyebrows    No menses, ablation 2015    + FH hair thinning in mother    2020 note NP Field  "Affected locations: scalp (and eyebrows and eyelashes)  Duration: 3 years (worsened recently)  Signs and Symptoms: thinning, shedding, receding frontal hairline.  Treatments tried: Biotin and MTV gummies x few months; Rodan and Fields lash boost x 1 yr.  Improvement on treatment: no relief    Hx of significantly elevated LFTs (seeing GI) - liver US wnl. Has appt with hepatology next month. Also with hx of positive anti-smooth muscle antibody.  + substernal chest pain that resolves with flexeril.  No known hx of thyroid d/o (TSH slightly elevated in 2018).  Recent iron, TIBC, and CBC wnl."    Has no hx of NMSC  Has no fhx ofMM    Current Outpatient Medications:     estradioL (ESTRACE) 1 MG tablet, Take 1 tablet (1 mg total) by mouth once daily., Disp: 30 tablet, Rfl: 11    progesterone (PROMETRIUM) 100 MG capsule, Take 1 capsule (100 mg total) by mouth nightly., Disp: 90 capsule, Rfl: 3        Review of Systems   Constitutional: Negative for fever, chills and fatigue.   Respiratory: Negative for shortness of breath.    Skin: Positive for dry skin and activity-related sunscreen use. Negative for itching, rash, daily sunscreen use and wears hat.        Objective:    Physical Exam   Constitutional: She appears well-developed and well-nourished. No distress.   Neurological: She is alert and oriented to person, place, and time. She is not disoriented.   Psychiatric: She has a normal mood and affect. "   Skin:   Areas Examined (abnormalities noted in diagram):   Scalp / Hair Palpated and Inspected  Head / Face Inspection Performed  Neck Inspection Performed              Diagram Legend     Erythematous scaling macule/papule c/w actinic keratosis       Vascular papule c/w angioma      Pigmented verrucoid papule/plaque c/w seborrheic keratosis      Yellow umbilicated papule c/w sebaceous hyperplasia      Irregularly shaped tan macule c/w lentigo     1-2 mm smooth white papules consistent with Milia      Movable subcutaneous cyst with punctum c/w epidermal inclusion cyst      Subcutaneous movable cyst c/w pilar cyst      Firm pink to brown papule c/w dermatofibroma      Pedunculated fleshy papule(s) c/w skin tag(s)      Evenly pigmented macule c/w junctional nevus     Mildly variegated pigmented, slightly irregular-bordered macule c/w mildly atypical nevus      Flesh colored to evenly pigmented papule c/w intradermal nevus       Pink pearly papule/plaque c/w basal cell carcinoma      Erythematous hyperkeratotic cursted plaque c/w SCC      Surgical scar with no sign of skin cancer recurrence      Open and closed comedones      Inflammatory papules and pustules      Verrucoid papule consistent consistent with wart     Erythematous eczematous patches and plaques     Dystrophic onycholytic nail with subungual debris c/w onychomycosis     Umbilicated papule    Erythematous-base heme-crusted tan verrucoid plaque consistent with inflamed seborrheic keratosis     Erythematous Silvery Scaling Plaque c/w Psoriasis     See annotation     Latest Reference Range & Units 04/28/22 09:55 05/17/22 10:31   Sodium 136 - 145 mmol/L 141    Potassium 3.5 - 5.1 mmol/L 4.3    Chloride 95 - 110 mmol/L 106    CO2 23 - 29 mmol/L 26    Anion Gap 8 - 16 mmol/L 9    BUN 6 - 20 mg/dL 17    Creatinine 0.5 - 1.4 mg/dL 0.7    eGFR if non African American >60 mL/min/1.73 m^2 >60    eGFR if African American >60 mL/min/1.73 m^2 >60    Glucose 70 - 110  mg/dL 97    Calcium 8.7 - 10.5 mg/dL 9.2    Alkaline Phosphatase 55 - 135 U/L 71    PROTEIN TOTAL 6.0 - 8.4 g/dL 7.3    Albumin 3.5 - 5.2 g/dL 4.1    BILIRUBIN TOTAL 0.1 - 1.0 mg/dL 0.6    AST 10 - 40 U/L 20    ALT 10 - 44 U/L 21    Hemoglobin A1C External 4.0 - 5.6 % 5.4    Estimated Avg Glucose 68 - 131 mg/dL 108    TSH 0.400 - 4.000 uIU/mL  0.400 - 4.000 uIU/mL  3.203  3.203   T3, Total 60 - 180 ng/dL  60 - 180 ng/dL  131  131   Free T4 0.71 - 1.51 ng/dL  0.71 - 1.51 ng/dL  0.88  0.88   Albumin 3.6 - 5.1 g/dL  4.4   DHEA 0.630 - 4.700 ng/mL 1.752    Estradiol See Text pg/mL <10 !    FSH See Text mIU/mL 127.64    LH See Text mIU/mL 39.4    Sex Hormone Binding Globulin 17 - 124 nmol/L  46   Testosterone 2 - 45 ng/dL  16   Testosterone, Bioavailable 0.5 - 8.5 ng/dL  2.9   Testosterone, Free 0.2 - 5.0 pg/mL  1.4   !: Data is abnormal    No recent CBC    No PCP               Latest Reference Range & Units 07/07/22 09:25   Iron 30 - 160 ug/dL 48   TIBC 250 - 450 ug/dL 478 (H)   Saturated Iron 20 - 50 % 10 (L)   Transferrin 200 - 375 mg/dL 323   Ferritin 20.0 - 300.0 ng/mL 15 (L)   (H): Data is abnormally high  (L): Data is abnormally low     Latest Reference Range & Units 07/07/22 09:25   WBC 3.90 - 12.70 K/uL 4.74   RBC 4.00 - 5.40 M/uL 4.13   Hemoglobin 12.0 - 16.0 g/dL 12.2   Hematocrit 37.0 - 48.5 % 38.1   MCV 82 - 98 fL 92   MCH 27.0 - 31.0 pg 29.5   MCHC 32.0 - 36.0 g/dL 32.0   RDW 11.5 - 14.5 % 13.8   Platelets 150 - 450 K/uL 328     Assessment / Plan:        Frontal fibrosing alopecia  -     CBC Auto Differential; Future; Expected date: 07/07/2022  -     FERRITIN; Future; Expected date: 07/07/2022  -     IRON AND TIBC; Future; Expected date: 07/07/2022  -     triamcinolone acetonide injection 10 mg  -     finasteride (PROSCAR) 5 mg tablet; Take 1 tablet (5 mg total) by mouth once daily.  Dispense: 30 tablet; Refill: 11  -     doxycycline monohydrate 100 mg Tab; Take 1 po qday  Dispense: 30 tablet; Refill: 2  -      tacrolimus (PROTOPIC) 0.1 % ointment; Apply thin film to anterior hairline/forehead once daily  Dispense: 100 g; Refill: 5  -     minoxidiL 5 % Foam; Apply half a capful to scalp QAM  Dispense: 60 g; Refill: 11    Hair loss  -     Ambulatory referral/consult to Dermatology    Other orders  -     Discontinue: bimatoprost (LATISSE) 0.03 % ophthalmic solution; Place one drop on applicator and apply evenly along the skin of the upper eyelid at base of eyelashes once daily at bedtime; repeat procedure for second eye (use a clean applicator).  Dispense: 5 mL; Refill: 12  -     bimatoprost (LATISSE) 0.03 % ophthalmic solution; Place one drop on applicator and apply evenly along the skin of the upper eyelid at base of eyelashes once daily at bedtime; repeat procedure for second eye (use a clean applicator).  Dispense: 5 mL; Refill: 12    Intralesional Kenalog 3mg/cc (3 cc total) injected into 1 lesions on the ant scalp today after obtaining verbal consent including risk of surrounding hypopigmentation. Patient tolerated procedure well.    Units: 1  NDC for Kenalog 10mg/cc:  6401-0538-60    Discussed benefits and risks of doxycyline therapy including but not limited to GI discomfort, esophageal irritation/ulceration, and increased sun sensitivity. Patient was counseled to take medicine with meals and at least 1 hour before lying down.     Finasteride is not approved by the FDA for use in women, however multiple studies have shown benefit when used to treat female hair loss in combination with topical minoxidil. Side effects are infrequent, usually mild, and reversible even with maintenance of treatment. They include decreased libido, breast tenderness, and headache. In premenopausal women, irregular menstruation and spotting may occur. Pregnancy must be avoided while on this medication (risk of feminization of the male fetus)    Stop Biotin, not helpful unless deficient which is very infrequent. No evidence of it being  helpful for FFA       Discussed with patient that there are multiple over the counter hair supplements, few of which have proven efficacy in controlled clinical trials. However, anecdotal reports have indicated a benefit for these vitamins. Review active ingredients, allergies, and proper use on line. The patient can elect to take at his/her discretion.  NUTRAFOL (nut[employer]fol[employer])  VIVISCAL (viviscal[employer])  UNTANGLED ([place])    Follow up in about 6 weeks (around 8/18/2022).

## 2022-07-11 ENCOUNTER — PATIENT MESSAGE (OUTPATIENT)
Dept: DERMATOLOGY | Facility: CLINIC | Age: 52
End: 2022-07-11
Payer: COMMERCIAL

## 2022-08-17 ENCOUNTER — OFFICE VISIT (OUTPATIENT)
Dept: OBSTETRICS AND GYNECOLOGY | Facility: CLINIC | Age: 52
End: 2022-08-17
Payer: COMMERCIAL

## 2022-08-17 VITALS
BODY MASS INDEX: 21.82 KG/M2 | DIASTOLIC BLOOD PRESSURE: 54 MMHG | WEIGHT: 139 LBS | RESPIRATION RATE: 16 BRPM | HEIGHT: 67 IN | SYSTOLIC BLOOD PRESSURE: 106 MMHG

## 2022-08-17 DIAGNOSIS — Z78.0 MENOPAUSE: Primary | ICD-10-CM

## 2022-08-17 PROCEDURE — 3074F PR MOST RECENT SYSTOLIC BLOOD PRESSURE < 130 MM HG: ICD-10-PCS | Mod: CPTII,S$GLB,, | Performed by: OBSTETRICS & GYNECOLOGY

## 2022-08-17 PROCEDURE — 3008F PR BODY MASS INDEX (BMI) DOCUMENTED: ICD-10-PCS | Mod: CPTII,S$GLB,, | Performed by: OBSTETRICS & GYNECOLOGY

## 2022-08-17 PROCEDURE — 99999 PR PBB SHADOW E&M-EST. PATIENT-LVL III: ICD-10-PCS | Mod: PBBFAC,,, | Performed by: OBSTETRICS & GYNECOLOGY

## 2022-08-17 PROCEDURE — 1159F PR MEDICATION LIST DOCUMENTED IN MEDICAL RECORD: ICD-10-PCS | Mod: CPTII,S$GLB,, | Performed by: OBSTETRICS & GYNECOLOGY

## 2022-08-17 PROCEDURE — 1159F MED LIST DOCD IN RCRD: CPT | Mod: CPTII,S$GLB,, | Performed by: OBSTETRICS & GYNECOLOGY

## 2022-08-17 PROCEDURE — 3008F BODY MASS INDEX DOCD: CPT | Mod: CPTII,S$GLB,, | Performed by: OBSTETRICS & GYNECOLOGY

## 2022-08-17 PROCEDURE — 99999 PR PBB SHADOW E&M-EST. PATIENT-LVL III: CPT | Mod: PBBFAC,,, | Performed by: OBSTETRICS & GYNECOLOGY

## 2022-08-17 PROCEDURE — 3074F SYST BP LT 130 MM HG: CPT | Mod: CPTII,S$GLB,, | Performed by: OBSTETRICS & GYNECOLOGY

## 2022-08-17 PROCEDURE — 3078F DIAST BP <80 MM HG: CPT | Mod: CPTII,S$GLB,, | Performed by: OBSTETRICS & GYNECOLOGY

## 2022-08-17 PROCEDURE — 3078F PR MOST RECENT DIASTOLIC BLOOD PRESSURE < 80 MM HG: ICD-10-PCS | Mod: CPTII,S$GLB,, | Performed by: OBSTETRICS & GYNECOLOGY

## 2022-08-17 PROCEDURE — 99213 PR OFFICE/OUTPT VISIT, EST, LEVL III, 20-29 MIN: ICD-10-PCS | Mod: S$GLB,,, | Performed by: OBSTETRICS & GYNECOLOGY

## 2022-08-17 PROCEDURE — 3044F HG A1C LEVEL LT 7.0%: CPT | Mod: CPTII,S$GLB,, | Performed by: OBSTETRICS & GYNECOLOGY

## 2022-08-17 PROCEDURE — 99213 OFFICE O/P EST LOW 20 MIN: CPT | Mod: S$GLB,,, | Performed by: OBSTETRICS & GYNECOLOGY

## 2022-08-17 PROCEDURE — 3044F PR MOST RECENT HEMOGLOBIN A1C LEVEL <7.0%: ICD-10-PCS | Mod: CPTII,S$GLB,, | Performed by: OBSTETRICS & GYNECOLOGY

## 2022-08-17 NOTE — PROGRESS NOTES
Chief Complaint   Patient presents with    Follow-up     3 month follow up hormone medication       History and Physical:  No LMP recorded (lmp unknown). Patient has had an ablation.    2022   Kamilla Montes is a 51 y.o.  who presents today for her routine annual GYN exam.     She reports an approximately 7 year history of hair loss including her eyebrows.  She states that lab work was tested in the past but workup was negative.  She has not been seen by Dermatology.    She has a history of bladder sling but has recently been noting some increasing issues with urinary urgency.  No hematuria or dysuria.    In the past she was diagnosed with musculoskeletal chest pain and occasionally uses cyclobenzaprine for this issue on requests a refill.    For menopausal standpoint she does note some worsening recent hot flashes.    2022  51-YO female presents as a follow up to discuss lab results. Her thyroid and testosterone levels were not drawn (while the patient was present the lab was contacted in regards to the previously ordered thyroid and testosterone testing).   Other labs confirmed menopause. She reports hot flashes which have not changed since her previous visit.     2022  She presents as a 3 month follow up after being started on HRT. She reports marked improvement in her symptoms as her hot flashes are less severe and less frequent. She does not feel like anything needs to be adjusted at this time.       Allergies: Review of patient's allergies indicates:  No Known Allergies    Past Medical History:   Diagnosis Date    Chest pain, musculoskeletal        Past Surgical History:   Procedure Laterality Date    AUGMENTATION OF BREAST Bilateral     BLADDER SUSPENSION      COLONOSCOPY N/A 2022    Procedure: COLONOSCOPY;  Surgeon: Santiago Espinoza MD;  Location: [facility];  Service: Endoscopy;  Laterality: N/A;    ES[facility]OGASTRODUODENOSCOPY      [facility]         MEDS:   Current  Outpatient Medications:     bimatoprost (LATISSE) 0.03 % ophthalmic solution, Place one drop on applicator and apply evenly along the skin of the upper eyelid at base of eyelashes once daily at bedtime; repeat procedure for second eye (use a clean applicator)., Disp: 5 mL, Rfl: 12    doxycycline monohydrate 100 mg Tab, Take 1 po qday, Disp: 30 tablet, Rfl: 2    estradioL (ESTRACE) 1 MG tablet, Take 1 tablet (1 mg total) by mouth once daily., Disp: 30 tablet, Rfl: 11    finasteride (PROSCAR) 5 mg tablet, Take 1 tablet (5 mg total) by mouth once daily., Disp: 30 tablet, Rfl: 11    minoxidiL 5 % Foam, Apply half a capful to scalp QAM, Disp: 60 g, Rfl: 11    progesterone (PROMETRIUM) 100 MG capsule, Take 1 capsule (100 mg total) by mouth nightly., Disp: 90 capsule, Rfl: 3    tacrolimus (PROTOPIC) 0.1 % ointment, Apply thin film to anterior hairline/forehead once daily, Disp: 100 g, Rfl: 5    Current Facility-Administered Medications:     triamcinolone acetonide injection 10 mg, 10 mg, Intradermal, 1 time in Clinic/HOD, Jocelyn Real MD     OB History        2    Para   2    Term   2            AB        Living   2       SAB        IAB        Ectopic        Multiple        Live Births               Obstetric Comments   Vaginal delivery x2             Social History     Socioeconomic History    Marital status:    Tobacco Use    Smoking status: Never Smoker    Smokeless tobacco: Never Used   Substance and Sexual Activity    Alcohol use: Yes     Alcohol/week: 1.0 standard drink     Types: 1 Glasses of wine per week     Comment: socially     Drug use: Never    Sexual activity: Yes     Birth control/protection: None, See Surgical Hx, Partner-Vasectomy       Family History   Problem Relation Age of Onset    Diabetes Mother        Past medical and surgical history reviewed.   I have reviewed the patient's medical history in detail and updated the computerized patient record.    Review  "of System:   Review of Systems   Constitutional: Negative for fever and unexpected weight change.   Respiratory: Negative for cough and shortness of breath.    Cardiovascular: Negative for chest pain.   Gastrointestinal: Negative for constipation, diarrhea and nausea.   Genitourinary: Negative for dysuria and frequency.   Musculoskeletal: Negative.    Skin: Negative.    Neurological: Negative.         Physical Exam:   BP (!) 106/54 (BP Location: Right arm, Patient Position: Sitting, BP Method: Medium (Manual))   Resp 16   Ht 5' 7" (1.702 m)   Wt 63 kg (139 lb)   LMP  (LMP Unknown)   BMI 21.77 kg/m²     Physical Exam  Constitutional:       Appearance: Normal appearance.   HENT:      Head: Normocephalic.   Neck:      Thyroid: No thyromegaly.   Cardiovascular:      Rate and Rhythm: Normal rate and regular rhythm.   Pulmonary:      Effort: Pulmonary effort is normal.      Breath sounds: Normal breath sounds.   Abdominal:      Palpations: Abdomen is soft.      Tenderness: There is no abdominal tenderness.   Musculoskeletal:      Right ankle: No swelling.      Left ankle: No swelling.   Skin:     General: Skin is warm and dry.   Neurological:      General: No focal deficit present.      Mental Status: She is alert.   Psychiatric:         Attention and Perception: Attention normal.         Mood and Affect: Mood normal.         Behavior: Behavior is cooperative.        Recent lab results:   TSH   Date Value Ref Range Status   05/17/2022 3.203 0.400 - 4.000 uIU/mL Final   05/17/2022 3.203 0.400 - 4.000 uIU/mL Final     T3, Total   Date Value Ref Range Status   05/17/2022 131 60 - 180 ng/dL Final   05/17/2022 131 60 - 180 ng/dL Final     Free T4   Date Value Ref Range Status   05/17/2022 0.88 0.71 - 1.51 ng/dL Final   05/17/2022 0.88 0.71 - 1.51 ng/dL Final     Testosterone, Free   Date Value Ref Range Status   05/17/2022 1.4 0.2 - 5.0 pg/mL Final     Testosterone   Date Value Ref Range Status   05/17/2022 16 2 - 45 " ng/dL Final     Comment:     For additional information, please refer to   http://education.[email or web address]/faq/TotalTestosteroneLCMSMS  (This link is being provided for informational/  educational purposes only.)    This test was developed and its analytical performance   characteristics have been determined by "[employer]. It has not been cleared or approved by the  FDA. This assay has been validated pursuant to the CLIA   regulations and is used for clinical purposes.    TEST PERFORMED AT:  [facility]  [id number] [place] [id number]  RIMMA BRUNER MD       Albumin   Date Value Ref Range Status   05/17/2022 4.4 3.6 - 5.1 g/dL Final     Sex Hormone Binding Globulin   Date Value Ref Range Status   05/17/2022 46 17 - 124 nmol/L Final         Assessment:   The encounter diagnosis was Menopause.       Plan:   Menopause       Follow up for as needed or for annual exam.     The above was reviewed and discussed with the patient.    We reviewed the results of her testosterone testing.    At this time the patient is overall improved after initiating hormone replacement therapy and would like to continue with this plan.    She will continue on estradiol 1 mg as well as 100 mg micronized progesterone.  The pros, cons, risks, benefits, alternatives and indications of the medication(s) prescribed, as well as appropriate use and potential side effects were discussed.      The patient's questions regarding the above were answered and she is in agreement with this plan.

## 2022-08-18 ENCOUNTER — OFFICE VISIT (OUTPATIENT)
Dept: DERMATOLOGY | Facility: CLINIC | Age: 52
End: 2022-08-18
Payer: COMMERCIAL

## 2022-08-18 VITALS — WEIGHT: 139 LBS | BODY MASS INDEX: 21.82 KG/M2 | HEIGHT: 67 IN

## 2022-08-18 DIAGNOSIS — L66.1 FRONTAL FIBROSING ALOPECIA: Primary | ICD-10-CM

## 2022-08-18 PROCEDURE — 3008F BODY MASS INDEX DOCD: CPT | Mod: CPTII,S$GLB,, | Performed by: DERMATOLOGY

## 2022-08-18 PROCEDURE — 1160F PR REVIEW ALL MEDS BY PRESCRIBER/CLIN PHARMACIST DOCUMENTED: ICD-10-PCS | Mod: CPTII,S$GLB,, | Performed by: DERMATOLOGY

## 2022-08-18 PROCEDURE — 99999 PR PBB SHADOW E&M-EST. PATIENT-LVL III: ICD-10-PCS | Mod: PBBFAC,,, | Performed by: DERMATOLOGY

## 2022-08-18 PROCEDURE — 99213 OFFICE O/P EST LOW 20 MIN: CPT | Mod: 25,S$GLB,, | Performed by: DERMATOLOGY

## 2022-08-18 PROCEDURE — 99999 PR PBB SHADOW E&M-EST. PATIENT-LVL III: CPT | Mod: PBBFAC,,, | Performed by: DERMATOLOGY

## 2022-08-18 PROCEDURE — 3008F PR BODY MASS INDEX (BMI) DOCUMENTED: ICD-10-PCS | Mod: CPTII,S$GLB,, | Performed by: DERMATOLOGY

## 2022-08-18 PROCEDURE — 3044F HG A1C LEVEL LT 7.0%: CPT | Mod: CPTII,S$GLB,, | Performed by: DERMATOLOGY

## 2022-08-18 PROCEDURE — 1159F MED LIST DOCD IN RCRD: CPT | Mod: CPTII,S$GLB,, | Performed by: DERMATOLOGY

## 2022-08-18 PROCEDURE — 99213 PR OFFICE/OUTPT VISIT, EST, LEVL III, 20-29 MIN: ICD-10-PCS | Mod: 25,S$GLB,, | Performed by: DERMATOLOGY

## 2022-08-18 PROCEDURE — 11900 INJECT SKIN LESIONS </W 7: CPT | Mod: S$GLB,,, | Performed by: DERMATOLOGY

## 2022-08-18 PROCEDURE — 1160F RVW MEDS BY RX/DR IN RCRD: CPT | Mod: CPTII,S$GLB,, | Performed by: DERMATOLOGY

## 2022-08-18 PROCEDURE — 1159F PR MEDICATION LIST DOCUMENTED IN MEDICAL RECORD: ICD-10-PCS | Mod: CPTII,S$GLB,, | Performed by: DERMATOLOGY

## 2022-08-18 PROCEDURE — 3044F PR MOST RECENT HEMOGLOBIN A1C LEVEL <7.0%: ICD-10-PCS | Mod: CPTII,S$GLB,, | Performed by: DERMATOLOGY

## 2022-08-18 PROCEDURE — 11900 PR INJECTION INTO SKIN LESIONS, UP TO 7: ICD-10-PCS | Mod: S$GLB,,, | Performed by: DERMATOLOGY

## 2022-08-18 NOTE — PROGRESS NOTES
Subjective:       Patient ID:  Kamilla Montes is a 51 y.o. female who presents for   Chief Complaint   Patient presents with    Hair Loss     Follow up     LOV- 7/7/22- frontal fibrosing alopecia    Here today for follow up on hair loss  C/o of hair still coming out  States that eye brows are showing some growth  Uses minoxidil, rogaine, finasteride  Untangled hair supplement    Menopaused, started supplementing June 2022, hot flashes improved  Recent GYN with Cody Hale    Has no hx of NMSC  Has no fhx of MM    Current Outpatient Medications:     bimatoprost (LATISSE) 0.03 % ophthalmic solution, Place one drop on applicator and apply evenly along the skin of the upper eyelid at base of eyelashes once daily at bedtime; repeat procedure for second eye (use a clean applicator)., Disp: 5 mL, Rfl: 12    doxycycline monohydrate 100 mg Tab, Take 1 po qday, Disp: 30 tablet, Rfl: 2    estradioL (ESTRACE) 1 MG tablet, Take 1 tablet (1 mg total) by mouth once daily., Disp: 30 tablet, Rfl: 11    finasteride (PROSCAR) 5 mg tablet, Take 1 tablet (5 mg total) by mouth once daily., Disp: 30 tablet, Rfl: 11    minoxidiL 5 % Foam, Apply half a capful to scalp QAM, Disp: 60 g, Rfl: 11    progesterone (PROMETRIUM) 100 MG capsule, Take 1 capsule (100 mg total) by mouth nightly., Disp: 90 capsule, Rfl: 3    tacrolimus (PROTOPIC) 0.1 % ointment, Apply thin film to anterior hairline/forehead once daily, Disp: 100 g, Rfl: 5          Review of Systems   Constitutional: Negative for fever, chills and fatigue.   Respiratory: Negative for shortness of breath.    Skin: Positive for dry skin and activity-related sunscreen use. Negative for itching, rash, daily sunscreen use and wears hat.        Objective:    Physical Exam   Constitutional: She appears well-developed and well-nourished. No distress.   Neurological: She is alert and oriented to person, place, and time. She is not disoriented.   Psychiatric: She has a normal mood and  affect.   Skin:   Areas Examined (abnormalities noted in diagram):   Scalp / Hair Palpated and Inspected  Head / Face Inspection Performed  Neck Inspection Performed              Diagram Legend     Erythematous scaling macule/papule c/w actinic keratosis       Vascular papule c/w angioma      Pigmented verrucoid papule/plaque c/w seborrheic keratosis      Yellow umbilicated papule c/w sebaceous hyperplasia      Irregularly shaped tan macule c/w lentigo     1-2 mm smooth white papules consistent with Milia      Movable subcutaneous cyst with punctum c/w epidermal inclusion cyst      Subcutaneous movable cyst c/w pilar cyst      Firm pink to brown papule c/w dermatofibroma      Pedunculated fleshy papule(s) c/w skin tag(s)      Evenly pigmented macule c/w junctional nevus     Mildly variegated pigmented, slightly irregular-bordered macule c/w mildly atypical nevus      Flesh colored to evenly pigmented papule c/w intradermal nevus       Pink pearly papule/plaque c/w basal cell carcinoma      Erythematous hyperkeratotic cursted plaque c/w SCC      Surgical scar with no sign of skin cancer recurrence      Open and closed comedones      Inflammatory papules and pustules      Verrucoid papule consistent consistent with wart     Erythematous eczematous patches and plaques     Dystrophic onycholytic nail with subungual debris c/w onychomycosis     Umbilicated papule    Erythematous-base heme-crusted tan verrucoid plaque consistent with inflamed seborrheic keratosis     Erythematous Silvery Scaling Plaque c/w Psoriasis     See annotation      Assessment / Plan:        Frontal fibrosing alopecia  -     triamcinolone acetonide injection 10 mg    Intralesional Kenalog 3mg/cc (2 cc total) injected into 1 lesions on the anterior scalp today after obtaining verbal consent including risk of surrounding hypopigmentation. Patient tolerated procedure well.    Units: 1  NDC for Kenalog 10mg/cc:  1103-5249-43    Mild improvement seen  (less erythema and scale), scalp remains asymptomatic  Continue current regimen  Doxy 100 QD for a total of 3 months  finasteride 5 QD  minioxidil 5%foam BID           Follow up in about 6 weeks (around 9/29/2022).

## 2022-09-29 ENCOUNTER — OFFICE VISIT (OUTPATIENT)
Dept: DERMATOLOGY | Facility: CLINIC | Age: 52
End: 2022-09-29
Payer: COMMERCIAL

## 2022-09-29 VITALS — WEIGHT: 139 LBS | HEIGHT: 67 IN | BODY MASS INDEX: 21.82 KG/M2

## 2022-09-29 DIAGNOSIS — L66.1 FRONTAL FIBROSING ALOPECIA: Primary | ICD-10-CM

## 2022-09-29 PROCEDURE — 3044F HG A1C LEVEL LT 7.0%: CPT | Mod: CPTII,S$GLB,, | Performed by: DERMATOLOGY

## 2022-09-29 PROCEDURE — 99213 PR OFFICE/OUTPT VISIT, EST, LEVL III, 20-29 MIN: ICD-10-PCS | Mod: S$GLB,,, | Performed by: DERMATOLOGY

## 2022-09-29 PROCEDURE — 99999 PR PBB SHADOW E&M-EST. PATIENT-LVL III: ICD-10-PCS | Mod: PBBFAC,,, | Performed by: DERMATOLOGY

## 2022-09-29 PROCEDURE — 1159F PR MEDICATION LIST DOCUMENTED IN MEDICAL RECORD: ICD-10-PCS | Mod: CPTII,S$GLB,, | Performed by: DERMATOLOGY

## 2022-09-29 PROCEDURE — 99213 OFFICE O/P EST LOW 20 MIN: CPT | Mod: S$GLB,,, | Performed by: DERMATOLOGY

## 2022-09-29 PROCEDURE — 1159F MED LIST DOCD IN RCRD: CPT | Mod: CPTII,S$GLB,, | Performed by: DERMATOLOGY

## 2022-09-29 PROCEDURE — 3008F BODY MASS INDEX DOCD: CPT | Mod: CPTII,S$GLB,, | Performed by: DERMATOLOGY

## 2022-09-29 PROCEDURE — 1160F PR REVIEW ALL MEDS BY PRESCRIBER/CLIN PHARMACIST DOCUMENTED: ICD-10-PCS | Mod: CPTII,S$GLB,, | Performed by: DERMATOLOGY

## 2022-09-29 PROCEDURE — 99999 PR PBB SHADOW E&M-EST. PATIENT-LVL III: CPT | Mod: PBBFAC,,, | Performed by: DERMATOLOGY

## 2022-09-29 PROCEDURE — 3008F PR BODY MASS INDEX (BMI) DOCUMENTED: ICD-10-PCS | Mod: CPTII,S$GLB,, | Performed by: DERMATOLOGY

## 2022-09-29 PROCEDURE — 3044F PR MOST RECENT HEMOGLOBIN A1C LEVEL <7.0%: ICD-10-PCS | Mod: CPTII,S$GLB,, | Performed by: DERMATOLOGY

## 2022-09-29 PROCEDURE — 1160F RVW MEDS BY RX/DR IN RCRD: CPT | Mod: CPTII,S$GLB,, | Performed by: DERMATOLOGY

## 2022-09-29 NOTE — PROGRESS NOTES
Subjective:       Patient ID:  Kamilla Montes is a 52 y.o. female who presents for   Chief Complaint   Patient presents with    Hair Loss     Follow up     LOV 8/18/22 Alopecia    Patient here today for F/U on hair loss x 5 years  Patient states when she washes her hair she sheds  Taking medications prescribed as instructed- Protopic ointment and Doxycycline 100mg daily  Minoxidil foam BID  Scalp no  longer itchy    Derm Hx:   Has no hx of NMSC  Has no fhx of MM    Current Outpatient Medications:   ·  bimatoprost (LATISSE) 0.03 % ophthalmic solution, Place one drop on applicator and apply evenly along the skin of the upper eyelid at base of eyelashes once daily at bedtime; repeat procedure for second eye (use a clean applicator)., Disp: 5 mL, Rfl: 12  ·  doxycycline monohydrate 100 mg Tab, Take 1 po qday, Disp: 30 tablet, Rfl: 2  ·  estradioL (ESTRACE) 1 MG tablet, Take 1 tablet (1 mg total) by mouth once daily., Disp: 30 tablet, Rfl: 11  ·  finasteride (PROSCAR) 5 mg tablet, Take 1 tablet (5 mg total) by mouth once daily., Disp: 30 tablet, Rfl: 11  ·  minoxidiL 5 % Foam, Apply half a capful to scalp QAM, Disp: 60 g, Rfl: 11  ·  progesterone (PROMETRIUM) 100 MG capsule, Take 1 capsule (100 mg total) by mouth nightly., Disp: 90 capsule, Rfl: 3  ·  tacrolimus (PROTOPIC) 0.1 % ointment, Apply thin film to anterior hairline/forehead once daily, Disp: 100 g, Rfl: 5          Review of Systems   Constitutional:  Negative for fever, chills and fatigue.   Respiratory:  Negative for shortness of breath.    Skin:  Positive for dry skin and activity-related sunscreen use. Negative for itching, rash, daily sunscreen use and wears hat.      Objective:    Physical Exam   Constitutional: She appears well-developed and well-nourished. No distress.   Neurological: She is alert and oriented to person, place, and time. She is not disoriented.   Psychiatric: She has a normal mood and affect.   Skin:   Areas Examined (abnormalities  noted in diagram):   Scalp / Hair Palpated and Inspected  Head / Face Inspection Performed  Neck Inspection Performed            Diagram Legend     Erythematous scaling macule/papule c/w actinic keratosis       Vascular papule c/w angioma      Pigmented verrucoid papule/plaque c/w seborrheic keratosis      Yellow umbilicated papule c/w sebaceous hyperplasia      Irregularly shaped tan macule c/w lentigo     1-2 mm smooth white papules consistent with Milia      Movable subcutaneous cyst with punctum c/w epidermal inclusion cyst      Subcutaneous movable cyst c/w pilar cyst      Firm pink to brown papule c/w dermatofibroma      Pedunculated fleshy papule(s) c/w skin tag(s)      Evenly pigmented macule c/w junctional nevus     Mildly variegated pigmented, slightly irregular-bordered macule c/w mildly atypical nevus      Flesh colored to evenly pigmented papule c/w intradermal nevus       Pink pearly papule/plaque c/w basal cell carcinoma      Erythematous hyperkeratotic cursted plaque c/w SCC      Surgical scar with no sign of skin cancer recurrence      Open and closed comedones      Inflammatory papules and pustules      Verrucoid papule consistent consistent with wart     Erythematous eczematous patches and plaques     Dystrophic onycholytic nail with subungual debris c/w onychomycosis     Umbilicated papule    Erythematous-base heme-crusted tan verrucoid plaque consistent with inflamed seborrheic keratosis     Erythematous Silvery Scaling Plaque c/w Psoriasis     See annotation                        Assessment / Plan:        Frontal fibrosing alopecia    Stabilized,   S/p ILK 3 x 2 rounds  Complete 3 months of doxy  Continue finasteride 5mg daily, minoxidil BID, protopic ointment daily  Latisse to eyebrows and lashes  Untangled hair supplement ([place])    RTC 3 months           No follow-ups on file.

## 2022-12-30 ENCOUNTER — OFFICE VISIT (OUTPATIENT)
Dept: DERMATOLOGY | Facility: CLINIC | Age: 52
End: 2022-12-30
Payer: COMMERCIAL

## 2022-12-30 VITALS — WEIGHT: 139 LBS | HEIGHT: 67 IN | BODY MASS INDEX: 21.82 KG/M2

## 2022-12-30 DIAGNOSIS — L66.1 FRONTAL FIBROSING ALOPECIA: Primary | ICD-10-CM

## 2022-12-30 DIAGNOSIS — L30.9 DERMATITIS: ICD-10-CM

## 2022-12-30 PROCEDURE — 99999 PR PBB SHADOW E&M-EST. PATIENT-LVL III: CPT | Mod: PBBFAC,,, | Performed by: DERMATOLOGY

## 2022-12-30 PROCEDURE — 99213 PR OFFICE/OUTPT VISIT, EST, LEVL III, 20-29 MIN: ICD-10-PCS | Mod: S$GLB,,, | Performed by: DERMATOLOGY

## 2022-12-30 PROCEDURE — 1159F MED LIST DOCD IN RCRD: CPT | Mod: CPTII,S$GLB,, | Performed by: DERMATOLOGY

## 2022-12-30 PROCEDURE — 3008F PR BODY MASS INDEX (BMI) DOCUMENTED: ICD-10-PCS | Mod: CPTII,S$GLB,, | Performed by: DERMATOLOGY

## 2022-12-30 PROCEDURE — 3008F BODY MASS INDEX DOCD: CPT | Mod: CPTII,S$GLB,, | Performed by: DERMATOLOGY

## 2022-12-30 PROCEDURE — 3044F PR MOST RECENT HEMOGLOBIN A1C LEVEL <7.0%: ICD-10-PCS | Mod: CPTII,S$GLB,, | Performed by: DERMATOLOGY

## 2022-12-30 PROCEDURE — 1159F PR MEDICATION LIST DOCUMENTED IN MEDICAL RECORD: ICD-10-PCS | Mod: CPTII,S$GLB,, | Performed by: DERMATOLOGY

## 2022-12-30 PROCEDURE — 99999 PR PBB SHADOW E&M-EST. PATIENT-LVL III: ICD-10-PCS | Mod: PBBFAC,,, | Performed by: DERMATOLOGY

## 2022-12-30 PROCEDURE — 1160F PR REVIEW ALL MEDS BY PRESCRIBER/CLIN PHARMACIST DOCUMENTED: ICD-10-PCS | Mod: CPTII,S$GLB,, | Performed by: DERMATOLOGY

## 2022-12-30 PROCEDURE — 1160F RVW MEDS BY RX/DR IN RCRD: CPT | Mod: CPTII,S$GLB,, | Performed by: DERMATOLOGY

## 2022-12-30 PROCEDURE — 3044F HG A1C LEVEL LT 7.0%: CPT | Mod: CPTII,S$GLB,, | Performed by: DERMATOLOGY

## 2022-12-30 PROCEDURE — 99213 OFFICE O/P EST LOW 20 MIN: CPT | Mod: S$GLB,,, | Performed by: DERMATOLOGY

## 2022-12-30 RX ORDER — TRIAMCINOLONE ACETONIDE 1 MG/G
CREAM TOPICAL
Qty: 80 G | Refills: 3 | Status: SHIPPED | OUTPATIENT
Start: 2022-12-30

## 2022-12-30 NOTE — PROGRESS NOTES
Subjective:       Patient ID:  Kamilla Montes is a 52 y.o. 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    Here today for follow up on hair loss-feels like there is less hair coming out when washed and combed  States that she is seeing some new hair growth  Uses foundation skincare serum, untangled supplement PO and REvive micro active 3 treatment  5mg finasteride daily  Minoxidil 5% daily  Protopic ointment  few times per week    Has no hx of NMSC  Has no fhx of MM    Current Outpatient Medications:   ·  bimatoprost (LATISSE) 0.03 % ophthalmic solution, Place one drop on applicator and apply evenly along the skin of the upper eyelid at base of eyelashes once daily at bedtime; repeat procedure for second eye (use a clean applicator)., Disp: 5 mL, Rfl: 12  ·  estradioL (ESTRACE) 1 MG tablet, Take 1 tablet (1 mg total) by mouth once daily., Disp: 30 tablet, Rfl: 11  ·  finasteride (PROSCAR) 5 mg tablet, Take 1 tablet (5 mg total) by mouth once daily., Disp: 30 tablet, Rfl: 11  ·  minoxidiL 5 % Foam, Apply half a capful to scalp QAM, Disp: 60 g, Rfl: 11  ·  progesterone (PROMETRIUM) 100 MG capsule, Take 1 capsule (100 mg total) by mouth nightly., Disp: 90 capsule, Rfl: 3  ·  tacrolimus (PROTOPIC) 0.1 % ointment, Apply thin film to anterior hairline/forehead once daily, Disp: 100 g, Rfl: 5  ·  doxycycline monohydrate 100 mg Tab, Take 1 po qday (Patient not taking: Reported on 12/30/2022), Disp: 30 tablet, Rfl: 2    Review of Systems   Constitutional:  Negative for fever, chills and fatigue.   Respiratory:  Negative for shortness of breath.    Skin:  Positive for dry skin and activity-related sunscreen use. Negative for itching, rash, daily sunscreen use and wears hat.      Objective:    Physical Exam   Constitutional: She appears well-developed and well-nourished. No distress.   Neurological: She is alert and oriented to  person, place, and time. She is not disoriented.   Psychiatric: She has a normal mood and affect.   Skin:   Areas Examined (abnormalities noted in diagram):   Scalp / Hair Palpated and Inspected  Head / Face Inspection Performed  Neck Inspection Performed            Diagram Legend     Erythematous scaling macule/papule c/w actinic keratosis       Vascular papule c/w angioma      Pigmented verrucoid papule/plaque c/w seborrheic keratosis      Yellow umbilicated papule c/w sebaceous hyperplasia      Irregularly shaped tan macule c/w lentigo     1-2 mm smooth white papules consistent with Milia      Movable subcutaneous cyst with punctum c/w epidermal inclusion cyst      Subcutaneous movable cyst c/w pilar cyst      Firm pink to brown papule c/w dermatofibroma      Pedunculated fleshy papule(s) c/w skin tag(s)      Evenly pigmented macule c/w junctional nevus     Mildly variegated pigmented, slightly irregular-bordered macule c/w mildly atypical nevus      Flesh colored to evenly pigmented papule c/w intradermal nevus       Pink pearly papule/plaque c/w basal cell carcinoma      Erythematous hyperkeratotic cursted plaque c/w SCC      Surgical scar with no sign of skin cancer recurrence      Open and closed comedones      Inflammatory papules and pustules      Verrucoid papule consistent consistent with wart     Erythematous eczematous patches and plaques     Dystrophic onycholytic nail with subungual debris c/w onychomycosis     Umbilicated papule    Erythematous-base heme-crusted tan verrucoid plaque consistent with inflamed seborrheic keratosis     Erythematous Silvery Scaling Plaque c/w Psoriasis     See annotation      Assessment / Plan:        Frontal fibrosing alopecia    Doing very well on current regimen  P[ost 3 mo 100mg doxy daily, okay to hold, minmal erythema and scale perifollicular  Continue finasteride 5mg daily  resume minoxidil 5% daily  Untangled hair supplement and serum (saw palmetto)  Latisse to  eyebrows    Dermatitis, mild, abodmen, contact, atopic?  -     triamcinolone acetonide 0.1% (KENALOG) 0.1 % cream; Apply to affected areas of body BID prn rash. Do not use on face, underarms, or groin.  Dispense: 80 g; Refill: 3    Good skin care regimen discussed including limiting to one bath or shower/day, using lukewarm water with mild soap and moisturizing cream to skin 1 - 2x/day.           No follow-ups on file.

## 2023-03-09 ENCOUNTER — HOSPITAL ENCOUNTER (OUTPATIENT)
Dept: RADIOLOGY | Facility: HOSPITAL | Age: 53
Discharge: HOME | End: 2023-03-09
Attending: ORTHOPAEDIC SURGERY
Payer: COMMERCIAL

## 2023-03-09 ENCOUNTER — OFFICE VISIT (OUTPATIENT)
Dept: ORTHOPEDICS | Facility: CLINIC | Age: 53
End: 2023-03-09
Payer: COMMERCIAL

## 2023-03-09 VITALS — WEIGHT: 139 LBS | HEIGHT: 67 IN | RESPIRATION RATE: 18 BRPM | BODY MASS INDEX: 21.82 KG/M2

## 2023-03-09 DIAGNOSIS — M25.512 LEFT SHOULDER PAIN, UNSPECIFIED CHRONICITY: ICD-10-CM

## 2023-03-09 DIAGNOSIS — M25.512 LEFT SHOULDER PAIN, UNSPECIFIED CHRONICITY: Primary | ICD-10-CM

## 2023-03-09 DIAGNOSIS — M75.32 CALCIFIC TENDINITIS OF LEFT SHOULDER: Primary | ICD-10-CM

## 2023-03-09 DIAGNOSIS — M75.32 CALCIFIC TENDINITIS OF LEFT SHOULDER: ICD-10-CM

## 2023-03-09 DIAGNOSIS — M54.50 LOW BACK PAIN, UNSPECIFIED BACK PAIN LATERALITY, UNSPECIFIED CHRONICITY, UNSPECIFIED WHETHER SCIATICA PRESENT: Primary | ICD-10-CM

## 2023-03-09 PROCEDURE — 1160F PR REVIEW ALL MEDS BY PRESCRIBER/CLIN PHARMACIST DOCUMENTED: ICD-10-PCS | Mod: CPTII,S$GLB,, | Performed by: ORTHOPAEDIC SURGERY

## 2023-03-09 PROCEDURE — 1160F RVW MEDS BY RX/DR IN RCRD: CPT | Mod: CPTII,S$GLB,, | Performed by: ORTHOPAEDIC SURGERY

## 2023-03-09 PROCEDURE — 73030 X-RAY EXAM OF SHOULDER: CPT | Mod: TC,PN,LT

## 2023-03-09 PROCEDURE — 99999 PR PBB SHADOW E&M-EST. PATIENT-LVL III: CPT | Mod: PBBFAC,,, | Performed by: ORTHOPAEDIC SURGERY

## 2023-03-09 PROCEDURE — 3008F BODY MASS INDEX DOCD: CPT | Mod: CPTII,S$GLB,, | Performed by: ORTHOPAEDIC SURGERY

## 2023-03-09 PROCEDURE — 99999 PR PBB SHADOW E&M-EST. PATIENT-LVL III: ICD-10-PCS | Mod: PBBFAC,,, | Performed by: ORTHOPAEDIC SURGERY

## 2023-03-09 PROCEDURE — 1159F PR MEDICATION LIST DOCUMENTED IN MEDICAL RECORD: ICD-10-PCS | Mod: CPTII,S$GLB,, | Performed by: ORTHOPAEDIC SURGERY

## 2023-03-09 PROCEDURE — 73030 XR SHOULDER TRAUMA 3 VIEW LEFT: ICD-10-PCS | Mod: 26,LT,, | Performed by: RADIOLOGY

## 2023-03-09 PROCEDURE — 73030 X-RAY EXAM OF SHOULDER: CPT | Mod: 26,LT,, | Performed by: RADIOLOGY

## 2023-03-09 PROCEDURE — 1159F MED LIST DOCD IN RCRD: CPT | Mod: CPTII,S$GLB,, | Performed by: ORTHOPAEDIC SURGERY

## 2023-03-09 PROCEDURE — 99204 OFFICE O/P NEW MOD 45 MIN: CPT | Mod: S$GLB,,, | Performed by: ORTHOPAEDIC SURGERY

## 2023-03-09 PROCEDURE — 3008F PR BODY MASS INDEX (BMI) DOCUMENTED: ICD-10-PCS | Mod: CPTII,S$GLB,, | Performed by: ORTHOPAEDIC SURGERY

## 2023-03-09 PROCEDURE — 99204 PR OFFICE/OUTPT VISIT, NEW, LEVL IV, 45-59 MIN: ICD-10-PCS | Mod: S$GLB,,, | Performed by: ORTHOPAEDIC SURGERY

## 2023-03-09 NOTE — PROGRESS NOTES
Past Medical History:   Diagnosis Date    Chest pain, musculoskeletal        Past Surgical History:   Procedure Laterality Date    AUGMENTATION OF BREAST Bilateral     BLADDER SUSPENSION      COLONOSCOPY N/A 6/13/2022    Procedure: COLONOSCOPY;  Surgeon: Santiago Espinoza MD;  Location: [facility];  Service: Endoscopy;  Laterality: N/A;    ESOPHAGOGASTRODUODENOSCOPY      Novasure         Current Outpatient Medications   Medication Sig    bimatoprost (LATISSE) 0.03 % ophthalmic solution Place one drop on applicator and apply evenly along the skin of the upper eyelid at base of eyelashes once daily at bedtime; repeat procedure for second eye (use a clean applicator).    estradioL (ESTRACE) 1 MG tablet Take 1 tablet (1 mg total) by mouth once daily.    finasteride (PROSCAR) 5 mg tablet Take 1 tablet (5 mg total) by mouth once daily.    minoxidiL 5 % Foam Apply half a capful to scalp QAM    progesterone (PROMETRIUM) 100 MG capsule Take 1 capsule (100 mg total) by mouth nightly.    tacrolimus (PROTOPIC) 0.1 % ointment Apply thin film to anterior hairline/forehead once daily    triamcinolone acetonide 0.1% (KENALOG) 0.1 % cream Apply to affected areas of body BID prn rash. Do not use on face, underarms, or groin.     Current Facility-Administered Medications   Medication    triamcinolone acetonide injection 10 mg    triamcinolone acetonide injection 10 mg       Review of patient's allergies indicates:  No Known Allergies    Family History   Problem Relation Age of Onset    Diabetes Mother        Social History     Socioeconomic History    Marital status:    Tobacco Use    Smoking status: Never    Smokeless tobacco: Never   Substance and Sexual Activity    Alcohol use: Yes     Alcohol/week: 1.0 standard drink     Types: 1 Glasses of wine per week     Comment: socially     Drug use: Never    Sexual activity: Yes     Birth control/protection: None, See Surgical Hx, Partner-Vasectomy       Chief Complaint:   Chief  Complaint   Patient presents with    Left Shoulder - Pain       History of present illness:  52-year-old right-hand-dominant female seen for left shoulder pain.  Patient has had pain since July of 2022.  There was no injury or trauma.  Pain behind her shoulder as well as along the lateral deltoid.  Range of motion is fine but painful reaching up above her head.  Pain laying on it at night.  Pain is a 5/10.  No previous treatment.  Does not take a lot of medication.    Answers submitted by the patient for this visit:  Orthopedics Questionnaire (Submitted on 3/7/2023)  unexpected weight change: No  appetite change : No  sleep disturbance: No  IMMUNOCOMPROMISED: No  nervous/ anxious: No  dysphoric mood: No  rash: No  visual disturbance: No  eye redness: No  eye pain: No  ear pain: No  tinnitus: No  hearing loss: No  sinus pressure : No  nosebleeds: No  enviro allergies: No  food allergies: No  cough: No  shortness of breath: No  sweating: No  dysuria: No  frequency: No  difficulty urinating: No  hematuria: No  painful intercourse: No  chest pain: No  palpitations: No  nausea: No  vomiting: No  diarrhea: No  blood in stool: No  constipation: No  headaches: No  dizziness: No  numbness: No  seizures: No  joint swelling: No  myalgia: Yes  weakness: No  back pain: Yes   (Submitted on 3/7/2023)  Chief Complaint: Arm pain  Pain Chronicity: recurrent  History of trauma: No  Onset: more than 1 month ago  Frequency: daily  Progression since onset: gradually worsening  Injury mechanism: lifting  injury location: at home  pain- numeric: 4/10  pain location: left shoulder  pain quality: aching, sharp, numbing  Radiating Pain: No  Aggravating factors: activity, exercise, extension, lifting  fever: No  inability to bear weight: No  itching: No  joint locking: No  limited range of motion: Yes  stiffness: No  tingling: Yes  Treatments tried: heat, other  physical therapy: not tried  Improvement on treatment: no relief      Physical  Examination:    Vital Signs:    Vitals:    03/09/23 0843   Resp: 18       Body mass index is 21.77 kg/m².    This a well-developed, well nourished patient in no acute distress.  They are alert and oriented and cooperative to examination.  Pt. walks without an antalgic gait.      Examination of the left shoulder shows no rashes or erythema. There are no masses, ecchymosis, or atrophy. The patient has full range of motion in forward flexion, external rotation, and internal rotation to the mid T-spine. The patient has minimally positive Saravia and Neer test.- Glendale's test. - Speeds test. Nontender to palpation over a.c. joint. Normal stability anteriorly, posteriorly, and negative sulcus sign. Passive range of motion: Forward flexion of 180°, external rotation at 90° of 90°, internal rotation of 50°, and external rotation at 0° of 50°. 2+ radial pulse. Intact axillary, radial, median and ulnar sensation. 5 out of 5 resisted forward flexion, external rotation, and negative lift off test.      X-rays:  X-rays of the left shoulder ordered and reviewed which show signs of calcific tendinitis possibly the subscapularis     Assessment::  Left calcific tendinitis    Plan:  I reviewed the x-rays and the findings with her today.  I recommended some physical therapy given the chronicity of her problem.  Okay to take NSAIDs for the pain as needed.  Follow-up in 6 weeks if not improving.  Also gave her a referral to back and spine for what her massage therapist diagnosed as a bone spur in her lumbar spine.    This note was created using [employer] voice recognition software that occasionally misinterpreted phrases or words.    Consult note is delivered via Epic messaging service.

## 2023-03-10 ENCOUNTER — TELEPHONE (OUTPATIENT)
Dept: FAMILY MEDICINE | Facility: CLINIC | Age: 53
End: 2023-03-10
Payer: COMMERCIAL

## 2023-03-17 ENCOUNTER — TELEPHONE (OUTPATIENT)
Dept: SPINE | Facility: CLINIC | Age: 53
End: 2023-03-17

## 2023-03-17 ENCOUNTER — OFFICE VISIT (OUTPATIENT)
Dept: SPINE | Facility: CLINIC | Age: 53
End: 2023-03-17
Payer: COMMERCIAL

## 2023-03-17 ENCOUNTER — HOSPITAL ENCOUNTER (OUTPATIENT)
Dept: RADIOLOGY | Facility: HOSPITAL | Age: 53
Discharge: HOME | End: 2023-03-17
Attending: PHYSICAL MEDICINE & REHABILITATION
Payer: COMMERCIAL

## 2023-03-17 VITALS — WEIGHT: 138.88 LBS | BODY MASS INDEX: 21.8 KG/M2 | HEIGHT: 67 IN

## 2023-03-17 DIAGNOSIS — M54.50 LOW BACK PAIN, UNSPECIFIED BACK PAIN LATERALITY, UNSPECIFIED CHRONICITY, UNSPECIFIED WHETHER SCIATICA PRESENT: ICD-10-CM

## 2023-03-17 PROCEDURE — 72114 X-RAY EXAM L-S SPINE BENDING: CPT | Mod: TC

## 2023-03-17 PROCEDURE — 99204 PR OFFICE/OUTPT VISIT, NEW, LEVL IV, 45-59 MIN: ICD-10-PCS | Mod: S$GLB,,, | Performed by: PHYSICAL MEDICINE & REHABILITATION

## 2023-03-17 PROCEDURE — 3008F BODY MASS INDEX DOCD: CPT | Mod: CPTII,S$GLB,, | Performed by: PHYSICAL MEDICINE & REHABILITATION

## 2023-03-17 PROCEDURE — 1159F PR MEDICATION LIST DOCUMENTED IN MEDICAL RECORD: ICD-10-PCS | Mod: CPTII,S$GLB,, | Performed by: PHYSICAL MEDICINE & REHABILITATION

## 2023-03-17 PROCEDURE — 1160F PR REVIEW ALL MEDS BY PRESCRIBER/CLIN PHARMACIST DOCUMENTED: ICD-10-PCS | Mod: CPTII,S$GLB,, | Performed by: PHYSICAL MEDICINE & REHABILITATION

## 2023-03-17 PROCEDURE — 1159F MED LIST DOCD IN RCRD: CPT | Mod: CPTII,S$GLB,, | Performed by: PHYSICAL MEDICINE & REHABILITATION

## 2023-03-17 PROCEDURE — 99204 OFFICE O/P NEW MOD 45 MIN: CPT | Mod: S$GLB,,, | Performed by: PHYSICAL MEDICINE & REHABILITATION

## 2023-03-17 PROCEDURE — 3008F PR BODY MASS INDEX (BMI) DOCUMENTED: ICD-10-PCS | Mod: CPTII,S$GLB,, | Performed by: PHYSICAL MEDICINE & REHABILITATION

## 2023-03-17 PROCEDURE — 1160F RVW MEDS BY RX/DR IN RCRD: CPT | Mod: CPTII,S$GLB,, | Performed by: PHYSICAL MEDICINE & REHABILITATION

## 2023-03-17 NOTE — PROGRESS NOTES
"  SUBJECTIVE:    Patient ID: Kamilla Montes is a 52 y.o. female.    Chief Complaint: Low-back Pain    This is a 52-year-old woman who has no primary care physician.  Denies any chronic major medical problems.  Long history of rather mild low back pain.  Does not interfere with her quality of life.  She got a massage on her lower back recently and the therapist told her she had a bone spur that should be checked out.  She presents to me with mild discomfort on the left at the lumbosacral junction.  No radicular symptoms.  Bowel and bladder are intact.  No fever chills sweats or unexpected weight loss.  Pain level is 3/10.  I have no imaging to review        Past Medical History:   Diagnosis Date    Chest pain, musculoskeletal      Social History     Socioeconomic History    Marital status:    Tobacco Use    Smoking status: Never    Smokeless tobacco: Never   Substance and Sexual Activity    Alcohol use: Yes     Alcohol/week: 1.0 standard drink     Types: 1 Glasses of wine per week     Comment: socially     Drug use: Never    Sexual activity: Yes     Birth control/protection: None, See Surgical Hx, Partner-Vasectomy     Past Surgical History:   Procedure Laterality Date    AUGMENTATION OF BREAST Bilateral     BLADDER SUSPENSION      COLONOSCOPY N/A 6/13/2022    Procedure: COLONOSCOPY;  Surgeon: Santiago Espinoza MD;  Location: [facility];  Service: Endoscopy;  Laterality: N/A;    ESOPHAGOGASTRODUODENOSCOPY      Novasure       Family History   Problem Relation Age of Onset    Diabetes Mother      Vitals:    03/17/23 0756   Weight: 63 kg (138 lb 14.2 oz)   Height: 5' 7" (1.702 m)       Review of Systems   Constitutional:  Negative for chills, diaphoresis, fatigue, fever and unexpected weight change.   HENT:  Negative for trouble swallowing.    Eyes:  Negative for visual disturbance.   Respiratory:  Negative for shortness of breath.    Cardiovascular:  Negative for chest pain.   Gastrointestinal:  Negative for " abdominal pain, constipation, nausea and vomiting.   Genitourinary:  Negative for difficulty urinating.   Musculoskeletal:  Negative for arthralgias, back pain, gait problem, joint swelling, myalgias, neck pain and neck stiffness.   Neurological:  Negative for dizziness, speech difficulty, weakness, light-headedness, numbness and headaches.        Objective:      Physical Exam  Neurological:      Mental Status: She is alert and oriented to person, place, and time.      Comments: She is awake and in no acute distress  Mild tenderness to palpation lumbar paraspinous musculature at the lumbosacral junction with no external lesions or palpable masses  Forward flexion and extension of the lumbar spine are normal and painless  She can heel and toe walk normally  Reflexes- +1-+2 reflexes at the following:   C5-Biceps   C6-Brachioradialis   C7-Triceps   L3/4-Patellar   S1-Achilles   Regi sign negative bilaterally  Strength testing- 5/5 strength in the following muscle groups:  C5-Elbow flexion  C6-Wrist extension  C7-Elbow extension  C8-Finger flexion  T1-Finger abduction  L2-Hip flexion  L3-Knee extension  L4-Ankle dorsiflexion  L5-Great toe extension  S1-Ankle plantar flexion       Straight leg raise negative bilaterally  GIULIA test negative bilaterally           Assessment:       1. Low back pain, unspecified back pain laterality, unspecified chronicity, unspecified whether sciatica present             Plan:     She has a nonfocal neurological examination and no historical red flags.  She is concerned about some possible bone spurs.  I told her as a general rule spurs do not causes problems.  I recommend some x-rays of the lumbar spine which I can report to her over the phone unless I see something worrisome.  She can follow up in clinic as needed      Low back pain, unspecified back pain laterality, unspecified chronicity, unspecified whether sciatica present  -     Ambulatory referral/consult to [facility]  -     X-Ray Lumbar Complete Including Flex And Ext; Future; Expected date: 03/17/2023

## 2023-03-17 NOTE — TELEPHONE ENCOUNTER
Spoke to pt and gave her xray results per Dr. Marquez. X-rays are normal.  There are no significant bone spurs.  No worrisome findings.  Continue activity as tolerated  Pt verbalized understanding.

## 2023-04-26 ENCOUNTER — PATIENT MESSAGE (OUTPATIENT)
Dept: OBSTETRICS AND GYNECOLOGY | Facility: CLINIC | Age: 53
End: 2023-04-26
Payer: COMMERCIAL

## 2023-04-26 DIAGNOSIS — Z12.39 ENCOUNTER FOR OTHER SCREENING FOR MALIGNANT NEOPLASM OF BREAST: Primary | ICD-10-CM

## 2023-05-17 ENCOUNTER — HOSPITAL ENCOUNTER (OUTPATIENT)
Dept: RADIOLOGY | Facility: CLINIC | Age: 53
Discharge: HOME | End: 2023-05-17
Attending: OBSTETRICS & GYNECOLOGY
Payer: COMMERCIAL

## 2023-05-17 DIAGNOSIS — Z12.39 ENCOUNTER FOR OTHER SCREENING FOR MALIGNANT NEOPLASM OF BREAST: ICD-10-CM

## 2023-05-17 PROCEDURE — 77067 MAMMO DIGITAL SCREENING BILAT WITH TOMO: ICD-10-PCS | Mod: 26,,, | Performed by: RADIOLOGY

## 2023-05-17 PROCEDURE — 77063 BREAST TOMOSYNTHESIS BI: CPT | Mod: 26,,, | Performed by: RADIOLOGY

## 2023-05-17 PROCEDURE — 77063 MAMMO DIGITAL SCREENING BILAT WITH TOMO: ICD-10-PCS | Mod: 26,,, | Performed by: RADIOLOGY

## 2023-05-17 PROCEDURE — 77067 SCR MAMMO BI INCL CAD: CPT | Mod: 26,,, | Performed by: RADIOLOGY

## 2023-05-17 PROCEDURE — 77067 SCR MAMMO BI INCL CAD: CPT | Mod: TC,PO

## 2023-06-29 ENCOUNTER — OFFICE VISIT (OUTPATIENT)
Dept: DERMATOLOGY | Facility: CLINIC | Age: 53
End: 2023-06-29
Payer: COMMERCIAL

## 2023-06-29 VITALS — BODY MASS INDEX: 21.8 KG/M2 | HEIGHT: 67 IN | WEIGHT: 138.88 LBS

## 2023-06-29 DIAGNOSIS — L66.1 FRONTAL FIBROSING ALOPECIA: Primary | ICD-10-CM

## 2023-06-29 PROCEDURE — 1160F PR REVIEW ALL MEDS BY PRESCRIBER/CLIN PHARMACIST DOCUMENTED: ICD-10-PCS | Mod: CPTII,S$GLB,, | Performed by: DERMATOLOGY

## 2023-06-29 PROCEDURE — 1159F MED LIST DOCD IN RCRD: CPT | Mod: CPTII,S$GLB,, | Performed by: DERMATOLOGY

## 2023-06-29 PROCEDURE — 99213 PR OFFICE/OUTPT VISIT, EST, LEVL III, 20-29 MIN: ICD-10-PCS | Mod: S$GLB,,, | Performed by: DERMATOLOGY

## 2023-06-29 PROCEDURE — 1160F RVW MEDS BY RX/DR IN RCRD: CPT | Mod: CPTII,S$GLB,, | Performed by: DERMATOLOGY

## 2023-06-29 PROCEDURE — 1159F PR MEDICATION LIST DOCUMENTED IN MEDICAL RECORD: ICD-10-PCS | Mod: CPTII,S$GLB,, | Performed by: DERMATOLOGY

## 2023-06-29 PROCEDURE — 99213 OFFICE O/P EST LOW 20 MIN: CPT | Mod: S$GLB,,, | Performed by: DERMATOLOGY

## 2023-06-29 PROCEDURE — 3008F PR BODY MASS INDEX (BMI) DOCUMENTED: ICD-10-PCS | Mod: CPTII,S$GLB,, | Performed by: DERMATOLOGY

## 2023-06-29 PROCEDURE — 3008F BODY MASS INDEX DOCD: CPT | Mod: CPTII,S$GLB,, | Performed by: DERMATOLOGY

## 2023-06-29 RX ORDER — FINASTERIDE 5 MG/1
5 TABLET, FILM COATED ORAL DAILY
Qty: 90 TABLET | Refills: 3 | Status: SHIPPED | OUTPATIENT
Start: 2023-06-29 | End: 2024-06-28

## 2023-06-29 NOTE — PROGRESS NOTES
Subjective:      Patient ID:  Kamilla Montes is a 52 y.o. female who presents for   Chief Complaint   Patient presents with    Alopecia     LOV 12/30/22 Frontal Fibrosing Alopecia    Here today for follow up on hair loss-feels like there is less hair coming out when washed and combed  States that she is seeing some new hair growth  Uses foundation skincare serum, untangled supplement PO and REvive micro active 3 treatment  5mg finasteride daily  Minoxidil 5% daily  Protopic ointment  few times per week  Scalp is not itchy     Has no hx of NMSC  Has no fhx of MM    Current Outpatient Medications:   ·  bimatoprost (LATISSE) 0.03 % ophthalmic solution, Place one drop on applicator and apply evenly along the skin of the upper eyelid at base of eyelashes once daily at bedtime; repeat procedure for second eye (use a clean applicator)., Disp: 5 mL, Rfl: 12  ·  finasteride (PROSCAR) 5 mg tablet, Take 1 tablet (5 mg total) by mouth once daily., Disp: 30 tablet, Rfl: 11  ·  minoxidiL 5 % Foam, Apply half a capful to scalp QAM, Disp: 60 g, Rfl: 11  ·  tacrolimus (PROTOPIC) 0.1 % ointment, Apply thin film to anterior hairline/forehead once daily, Disp: 100 g, Rfl: 5  ·  triamcinolone acetonide 0.1% (KENALOG) 0.1 % cream, Apply to affected areas of body BID prn rash. Do not use on face, underarms, or groin., Disp: 80 g, Rfl: 3  ·  estradioL (ESTRACE) 1 MG tablet, Take 1 tablet (1 mg total) by mouth once daily., Disp: 30 tablet, Rfl: 11  ·  progesterone (PROMETRIUM) 100 MG capsule, Take 1 capsule (100 mg total) by mouth nightly., Disp: 90 capsule, Rfl: 3      Review of Systems   Constitutional:  Negative for fever, chills and fatigue.   Respiratory:  Negative for cough and shortness of breath.    Skin:  Positive for activity-related sunscreen use. Negative for itching, rash, dry skin, daily sunscreen use and wears hat.     Objective:   Physical Exam   Constitutional: She appears well-developed and well-nourished. No distress.    Neurological: She is alert and oriented to person, place, and time. She is not disoriented.   Psychiatric: She has a normal mood and affect.   Skin:   Areas Examined (abnormalities noted in diagram):   Scalp / Hair Palpated and Inspected  Head / Face Inspection Performed  Neck Inspection Performed          Diagram Legend     Erythematous scaling macule/papule c/w actinic keratosis       Vascular papule c/w angioma      Pigmented verrucoid papule/plaque c/w seborrheic keratosis      Yellow umbilicated papule c/w sebaceous hyperplasia      Irregularly shaped tan macule c/w lentigo     1-2 mm smooth white papules consistent with Milia      Movable subcutaneous cyst with punctum c/w epidermal inclusion cyst      Subcutaneous movable cyst c/w pilar cyst      Firm pink to brown papule c/w dermatofibroma      Pedunculated fleshy papule(s) c/w skin tag(s)      Evenly pigmented macule c/w junctional nevus     Mildly variegated pigmented, slightly irregular-bordered macule c/w mildly atypical nevus      Flesh colored to evenly pigmented papule c/w intradermal nevus       Pink pearly papule/plaque c/w basal cell carcinoma      Erythematous hyperkeratotic cursted plaque c/w SCC      Surgical scar with no sign of skin cancer recurrence      Open and closed comedones      Inflammatory papules and pustules      Verrucoid papule consistent consistent with wart     Erythematous eczematous patches and plaques     Dystrophic onycholytic nail with subungual debris c/w onychomycosis     Umbilicated papule    Erythematous-base heme-crusted tan verrucoid plaque consistent with inflamed seborrheic keratosis     Erythematous Silvery Scaling Plaque c/w Psoriasis     See annotation      Assessment / Plan:        Frontal fibrosing alopecia  -     finasteride (PROSCAR) 5 mg tablet; Take 1 tablet (5 mg total) by mouth once daily.  Dispense: 90 tablet; Refill: 3    Reassuring exam, no longer with perifollicular erythema and scale , no  itch  Continue current regimen:  Finasteride 5mg daily  Protopic ointment to anterior hairline 3-5 times weekly  Minoxidil foam 5% daily    Finasteride is not approved by the FDA for use in women, however multiple studies have shown benefit when used to treat female hair loss in combination with topical minoxidil. Side effects are infrequent, usually mild, and reversible even with maintenance of treatment. They include decreased libido, breast tenderness, and headache. In premenopausal women, irregular menstruation and spotting may occur. Pregnancy must be avoided while on this medication (risk of feminization of the male fetus)           No follow-ups on file.

## 2023-07-25 ENCOUNTER — OFFICE VISIT (OUTPATIENT)
Dept: OBSTETRICS AND GYNECOLOGY | Facility: CLINIC | Age: 53
End: 2023-07-25
Payer: COMMERCIAL

## 2023-07-25 ENCOUNTER — HOSPITAL ENCOUNTER (OUTPATIENT)
Dept: RADIOLOGY | Facility: HOSPITAL | Age: 53
Discharge: HOME | End: 2023-07-25
Attending: OBSTETRICS & GYNECOLOGY
Payer: COMMERCIAL

## 2023-07-25 VITALS
DIASTOLIC BLOOD PRESSURE: 56 MMHG | SYSTOLIC BLOOD PRESSURE: 100 MMHG | RESPIRATION RATE: 16 BRPM | BODY MASS INDEX: 21.59 KG/M2 | HEIGHT: 67 IN | WEIGHT: 137.56 LBS

## 2023-07-25 DIAGNOSIS — K64.0 GRADE I HEMORRHOIDS: ICD-10-CM

## 2023-07-25 DIAGNOSIS — Z78.0 MENOPAUSE: ICD-10-CM

## 2023-07-25 DIAGNOSIS — Z01.419 WELL WOMAN EXAM WITH ROUTINE GYNECOLOGICAL EXAM: Primary | ICD-10-CM

## 2023-07-25 DIAGNOSIS — K59.09 OTHER CONSTIPATION: ICD-10-CM

## 2023-07-25 DIAGNOSIS — Z12.4 SCREENING FOR MALIGNANT NEOPLASM OF CERVIX: ICD-10-CM

## 2023-07-25 DIAGNOSIS — N95.0 PMB (POSTMENOPAUSAL BLEEDING): ICD-10-CM

## 2023-07-25 PROCEDURE — 99999 PR PBB SHADOW E&M-EST. PATIENT-LVL IV: CPT | Mod: PBBFAC,,, | Performed by: OBSTETRICS & GYNECOLOGY

## 2023-07-25 PROCEDURE — 99999 PR PBB SHADOW E&M-EST. PATIENT-LVL IV: ICD-10-PCS | Mod: PBBFAC,,, | Performed by: OBSTETRICS & GYNECOLOGY

## 2023-07-25 PROCEDURE — 3078F PR MOST RECENT DIASTOLIC BLOOD PRESSURE < 80 MM HG: ICD-10-PCS | Mod: CPTII,S$GLB,, | Performed by: OBSTETRICS & GYNECOLOGY

## 2023-07-25 PROCEDURE — 88175 CYTOPATH C/V AUTO FLUID REDO: CPT | Performed by: OBSTETRICS & GYNECOLOGY

## 2023-07-25 PROCEDURE — 3074F SYST BP LT 130 MM HG: CPT | Mod: CPTII,S$GLB,, | Performed by: OBSTETRICS & GYNECOLOGY

## 2023-07-25 PROCEDURE — 76830 US PELVIS COMP WITH TRANSVAG NON-OB (XPD): ICD-10-PCS | Mod: 26,,, | Performed by: RADIOLOGY

## 2023-07-25 PROCEDURE — 87624 HPV HI-RISK TYP POOLED RSLT: CPT | Performed by: OBSTETRICS & GYNECOLOGY

## 2023-07-25 PROCEDURE — 3008F PR BODY MASS INDEX (BMI) DOCUMENTED: ICD-10-PCS | Mod: CPTII,S$GLB,, | Performed by: OBSTETRICS & GYNECOLOGY

## 2023-07-25 PROCEDURE — 99396 PR PREVENTIVE VISIT,EST,40-64: ICD-10-PCS | Mod: S$GLB,,, | Performed by: OBSTETRICS & GYNECOLOGY

## 2023-07-25 PROCEDURE — 76830 TRANSVAGINAL US NON-OB: CPT | Mod: 26,,, | Performed by: RADIOLOGY

## 2023-07-25 PROCEDURE — 99396 PREV VISIT EST AGE 40-64: CPT | Mod: S$GLB,,, | Performed by: OBSTETRICS & GYNECOLOGY

## 2023-07-25 PROCEDURE — 3074F PR MOST RECENT SYSTOLIC BLOOD PRESSURE < 130 MM HG: ICD-10-PCS | Mod: CPTII,S$GLB,, | Performed by: OBSTETRICS & GYNECOLOGY

## 2023-07-25 PROCEDURE — 76856 US EXAM PELVIC COMPLETE: CPT | Mod: TC,PO

## 2023-07-25 PROCEDURE — 76856 US EXAM PELVIC COMPLETE: CPT | Mod: 26,,, | Performed by: RADIOLOGY

## 2023-07-25 PROCEDURE — 3008F BODY MASS INDEX DOCD: CPT | Mod: CPTII,S$GLB,, | Performed by: OBSTETRICS & GYNECOLOGY

## 2023-07-25 PROCEDURE — 3078F DIAST BP <80 MM HG: CPT | Mod: CPTII,S$GLB,, | Performed by: OBSTETRICS & GYNECOLOGY

## 2023-07-25 PROCEDURE — 76856 US PELVIS COMP WITH TRANSVAG NON-OB (XPD): ICD-10-PCS | Mod: 26,,, | Performed by: RADIOLOGY

## 2023-07-25 RX ORDER — PROGESTERONE 100 MG/1
100 CAPSULE ORAL NIGHTLY
Qty: 90 CAPSULE | Refills: 3 | Status: SHIPPED | OUTPATIENT
Start: 2023-07-25 | End: 2024-04-03 | Stop reason: SDUPTHER

## 2023-07-25 RX ORDER — ESTRADIOL 1 MG/1
1 TABLET ORAL DAILY
Qty: 30 TABLET | Refills: 11 | Status: SHIPPED | OUTPATIENT
Start: 2023-07-25 | End: 2024-04-03 | Stop reason: SDUPTHER

## 2023-07-25 NOTE — PROGRESS NOTES
Chief Complaint   Patient presents with    Annual Exam       History and Physical:  No LMP recorded. Patient has had an ablation.    HRT:  Estradiol and micronized progesterone    Date:     Kamilla Montes is a 52 y.o.  who presents today for her routine annual GYN exam.   The patient does have a history of previous endometrial ablation.  This was approximally 2017.  She recently noted some slight bleeding on 2 occasions but is unsure if this is secondary to vaginal bleeding or hemorrhoids.  She does have constipation which has worsened her hemorrhoidal issues.  Constipation has been a longstanding unchanged issue.  Colonoscopy was in 2022.    Mammogram was in May of 2023.    The patient reports no changes in her medical or surgical history since her last evaluation.      Allergies: Review of patient's allergies indicates:  No Known Allergies    Past Medical History:   Diagnosis Date    Chest pain, musculoskeletal        Past Surgical History:   Procedure Laterality Date    AUGMENTATION OF BREAST Bilateral     BLADDER SUSPENSION      COLONOSCOPY N/A 2022    Procedure: COLONOSCOPY;  Surgeon: Santiago Espinoza MD;  Location: [facility];  Service: Endoscopy;  Laterality: N/A;    ESOPHAGOGASTRODUODENOSCOPY      [facility]:   Current Outpatient Medications:     bimatoprost (LATISSE) 0.03 % ophthalmic solution, Place one drop on applicator and apply evenly along the skin of the upper eyelid at base of eyelashes once daily at bedtime; repeat procedure for second eye (use a clean applicator)., Disp: 5 mL, Rfl: 12    finasteride (PROSCAR) 5 mg tablet, Take 1 tablet (5 mg total) by mouth once daily., Disp: 90 tablet, Rfl: 3    minoxidiL 5 % Foam, Apply half a capful to scalp QAM, Disp: 60 g, Rfl: 11    tacrolimus (PROTOPIC) 0.1 % ointment, Apply thin film to anterior hairline/forehead once daily, Disp: 100 g, Rfl: 5    triamcinolone acetonide 0.1% (KENALOG) 0.1 % cream, Apply to affected areas of  body BID prn rash. Do not use on face, underarms, or groin., Disp: 80 g, Rfl: 3    estradioL (ESTRACE) 1 MG tablet, Take 1 tablet (1 mg total) by mouth once daily., Disp: 30 tablet, Rfl: 11    progesterone (PROMETRIUM) 100 MG capsule, Take 1 capsule (100 mg total) by mouth nightly., Disp: 90 capsule, Rfl: 3    Current Facility-Administered Medications:     triamcinolone acetonide injection 10 mg, 10 mg, Intradermal, 1 time in Clinic/HOD, Jocelyn Real MD    triamcinolone acetonide injection 10 mg, 10 mg, Intradermal, 1 time in Clinic/HOD, Jocelyn Real MD     OB History          2    Para   2    Term   2            AB        Living   2         SAB        IAB        Ectopic        Multiple        Live Births               Obstetric Comments   Vaginal delivery x2               Social History     Socioeconomic History    Marital status:    Tobacco Use    Smoking status: Never    Smokeless tobacco: Never   Substance and Sexual Activity    Alcohol use: Yes     Alcohol/week: 1.0 standard drink     Types: 1 Glasses of wine per week     Comment: socially     Drug use: Never    Sexual activity: Yes     Birth control/protection: None, See Surgical Hx, Partner-Vasectomy       Family History   Problem Relation Age of Onset    Diabetes Mother          Past medical and surgical history reviewed.   I have reviewed the patient's medical history in detail and updated the computerized patient record.      Review of Systems (at today's evaluation)  Review of Systems   Constitutional:  Negative for fever and unexpected weight change.   HENT:  Negative for congestion, ear pain, nosebleeds, sinus pain and sore throat.    Eyes: Negative.    Respiratory:  Negative for cough and shortness of breath.    Cardiovascular:  Negative for chest pain and palpitations.   Gastrointestinal:  Positive for constipation. Negative for diarrhea, nausea and vomiting.   Endocrine: Negative.    Genitourinary:  Negative for  "dysuria, frequency, hematuria and urgency.        Gyn as per HPI   Musculoskeletal:  Negative for arthralgias and myalgias.   Skin:  Negative for rash.   Neurological:  Negative for weakness and headaches.   Psychiatric/Behavioral:  The patient is not nervous/anxious.       Physical Exam:   BP (!) 100/56 (BP Location: Right arm, Patient Position: Sitting, BP Method: Medium (Manual))   Resp 16   Ht 5' 7" (1.702 m)   Wt 62.4 kg (137 lb 9.1 oz)   BMI 21.55 kg/m²       Physical Exam:   Constitutional: She appears well-developed and well-nourished.    HENT:   Head: Normocephalic.     Neck: No thyroid mass present.    Cardiovascular:  Normal rate, regular rhythm and normal heart sounds.             Pulmonary/Chest: Effort normal and breath sounds normal. Right breast exhibits no mass, no nipple discharge and no skin change. Left breast exhibits no mass, no nipple discharge and no skin change.        Abdominal: Soft. There is no abdominal tenderness.     Genitourinary:    Inguinal canal, vagina, uterus, right adnexa and left adnexa normal.   Rectum:      External hemorrhoid present.      Pelvic exam was performed with patient supine.   The external female genitalia was normal.   No external genitalia lesions identified,Cervix is normal. Right adnexum displays no mass and no tenderness. Left adnexum displays no mass and no tenderness. No tenderness or bleeding in the vagina. Uterus is not tender. Normal urethral meatus.Urethra findings: no tendernessBladder findings: no bladder tenderness          Musculoskeletal:      Right lower leg: No edema.      Left lower leg: No edema.      Lymphadenopathy:     She has no cervical adenopathy. No inguinal adenopathy noted on the right or left side.    Neurological: She is alert.   No gross defects noted    Skin: Skin is warm and dry.    Psychiatric: Mood normal.        Assessment:        1. Well woman exam with routine gynecological exam    2. Menopause    3. PMB (postmenopausal " bleeding)    4. Other constipation    5. Screening for malignant neoplasm of cervix    6. Grade I hemorrhoids         Plan:      Well woman exam with routine gynecological exam    Menopause  -     estradioL (ESTRACE) 1 MG tablet; Take 1 tablet (1 mg total) by mouth once daily.  Dispense: 30 tablet; Refill: 11  -     progesterone (PROMETRIUM) 100 MG capsule; Take 1 capsule (100 mg total) by mouth nightly.  Dispense: 90 capsule; Refill: 3    PMB (postmenopausal bleeding)  -     US Pelvis Comp with Transvag NON-OB (xpd; Future; Expected date: 07/25/2023    Other constipation    Screening for malignant neoplasm of cervix  -     HPV High Risk Genotypes, PCR  -     Liquid-Based Pap Smear, Screening    Grade I hemorrhoids       Follow up for ASAP after recommended testing obtained.     The above was reviewed and discussed with the patient.    Annual exam and screening issues based on the patient's age, medical history and family history were reviewed / discussed.  Routine health maintenance issues were reviewed and discussed.      The patient's current hormone replacement therapy was discussed and the patient will continue on Estrace 1 mg and micronized progesterone 100 mg.  The pros, cons, risks, benefits, alternatives and indications of the medication(s) prescribed, as well as appropriate use and potential side effects were discussed.  Oncologic and cardiovascular issues associated hormone replacement therapy were discussed.    We discussed the potential for postmenopausal bleeding versus hemorrhoid versus other.  We will initially evaluate endometrial cavity via pelvic ultrasound and base further evaluation treatment results of this test.    The patient's questions were answered, and she is in agreement with the current plan.     Cody Hale MD  Department OBGYN [facility]

## 2023-07-27 ENCOUNTER — OFFICE VISIT (OUTPATIENT)
Dept: OBSTETRICS AND GYNECOLOGY | Facility: CLINIC | Age: 53
End: 2023-07-27
Payer: COMMERCIAL

## 2023-07-27 VITALS
BODY MASS INDEX: 21.59 KG/M2 | WEIGHT: 137.56 LBS | HEIGHT: 67 IN | DIASTOLIC BLOOD PRESSURE: 72 MMHG | SYSTOLIC BLOOD PRESSURE: 100 MMHG

## 2023-07-27 DIAGNOSIS — N95.0 PMB (POSTMENOPAUSAL BLEEDING): Primary | ICD-10-CM

## 2023-07-27 PROCEDURE — 99999 PR PBB SHADOW E&M-EST. PATIENT-LVL III: CPT | Mod: PBBFAC,,, | Performed by: OBSTETRICS & GYNECOLOGY

## 2023-07-27 PROCEDURE — 3008F PR BODY MASS INDEX (BMI) DOCUMENTED: ICD-10-PCS | Mod: CPTII,S$GLB,, | Performed by: OBSTETRICS & GYNECOLOGY

## 2023-07-27 PROCEDURE — 3078F DIAST BP <80 MM HG: CPT | Mod: CPTII,S$GLB,, | Performed by: OBSTETRICS & GYNECOLOGY

## 2023-07-27 PROCEDURE — 1159F MED LIST DOCD IN RCRD: CPT | Mod: CPTII,S$GLB,, | Performed by: OBSTETRICS & GYNECOLOGY

## 2023-07-27 PROCEDURE — 3078F PR MOST RECENT DIASTOLIC BLOOD PRESSURE < 80 MM HG: ICD-10-PCS | Mod: CPTII,S$GLB,, | Performed by: OBSTETRICS & GYNECOLOGY

## 2023-07-27 PROCEDURE — 99213 PR OFFICE/OUTPT VISIT, EST, LEVL III, 20-29 MIN: ICD-10-PCS | Mod: S$GLB,,, | Performed by: OBSTETRICS & GYNECOLOGY

## 2023-07-27 PROCEDURE — 99213 OFFICE O/P EST LOW 20 MIN: CPT | Mod: S$GLB,,, | Performed by: OBSTETRICS & GYNECOLOGY

## 2023-07-27 PROCEDURE — 3074F SYST BP LT 130 MM HG: CPT | Mod: CPTII,S$GLB,, | Performed by: OBSTETRICS & GYNECOLOGY

## 2023-07-27 PROCEDURE — 3008F BODY MASS INDEX DOCD: CPT | Mod: CPTII,S$GLB,, | Performed by: OBSTETRICS & GYNECOLOGY

## 2023-07-27 PROCEDURE — 1159F PR MEDICATION LIST DOCUMENTED IN MEDICAL RECORD: ICD-10-PCS | Mod: CPTII,S$GLB,, | Performed by: OBSTETRICS & GYNECOLOGY

## 2023-07-27 PROCEDURE — 99999 PR PBB SHADOW E&M-EST. PATIENT-LVL III: ICD-10-PCS | Mod: PBBFAC,,, | Performed by: OBSTETRICS & GYNECOLOGY

## 2023-07-27 PROCEDURE — 3074F PR MOST RECENT SYSTOLIC BLOOD PRESSURE < 130 MM HG: ICD-10-PCS | Mod: CPTII,S$GLB,, | Performed by: OBSTETRICS & GYNECOLOGY

## 2023-07-27 NOTE — PROGRESS NOTES
Chief Complaint   Patient presents with    [employer] Woman     F/U for U/S Results.       History and Physical:  No LMP recorded. Patient has had an ablation.    HRT:  Estradiol and micronized progesterone    Date:     Kamilla Montes is a 52 y.o.  who presents today for her routine annual GYN exam.   The patient has no Gynecology complaints today. ***    The patient reports no changes in her medical or surgical history since her last evaluation. ***    ***    Allergies: Review of patient's allergies indicates:  No Known Allergies    Past Medical History:   Diagnosis Date    Chest pain, musculoskeletal        Past Surgical History:   Procedure Laterality Date    AUGMENTATION OF BREAST Bilateral     BLADDER SUSPENSION      COLONOSCOPY N/A 2022    Procedure: COLONOSCOPY;  Surgeon: Santiago Espinoza MD;  Location: [facility];  Service: Endoscopy;  Laterality: N/A;    [facility]         [facility]   Current Outpatient Medications:     bimatoprost (LATISSE) 0.03 % ophthalmic solution, Place one drop on applicator and apply evenly along the skin of the upper eyelid at base of eyelashes once daily at bedtime; repeat procedure for second eye (use a clean applicator)., Disp: 5 mL, Rfl: 12    estradioL (ESTRACE) 1 MG tablet, Take 1 tablet (1 mg total) by mouth once daily., Disp: 30 tablet, Rfl: 11    finasteride (PROSCAR) 5 mg tablet, Take 1 tablet (5 mg total) by mouth once daily., Disp: 90 tablet, Rfl: 3    minoxidiL 5 % Foam, Apply half a capful to scalp QAM, Disp: 60 g, Rfl: 11    progesterone (PROMETRIUM) 100 MG capsule, Take 1 capsule (100 mg total) by mouth nightly., Disp: 90 capsule, Rfl: 3    tacrolimus (PROTOPIC) 0.1 % ointment, Apply thin film to anterior hairline/forehead once daily, Disp: 100 g, Rfl: 5    triamcinolone acetonide 0.1% (KENALOG) 0.1 % cream, Apply to affected areas of body BID prn rash. Do not use on face, underarms, or groin., Disp: 80 g, Rfl: 3    Current  "Facility-Administered Medications:     triamcinolone acetonide injection 10 mg, 10 mg, Intradermal, 1 time in Clinic/HOD, Jocelyn Real MD    triamcinolone acetonide injection 10 mg, 10 mg, Intradermal, 1 time in Clinic/HOD, Jocelyn Real MD     OB History          2    Para   2    Term   2            AB        Living   2         SAB        IAB        Ectopic        Multiple        Live Births               Obstetric Comments   Vaginal delivery x2               Social History     Socioeconomic History    Marital status:    Tobacco Use    Smoking status: Never    Smokeless tobacco: Never   Substance and Sexual Activity    Alcohol use: Yes     Alcohol/week: 1.0 standard drink     Types: 1 Glasses of wine per week     Comment: socially     Drug use: Never    Sexual activity: Yes     Birth control/protection: None, See Surgical Hx, Partner-Vasectomy       Family History   Problem Relation Age of Onset    Diabetes Mother          Past medical and surgical history reviewed.   I have reviewed the patient's medical history in detail and updated the computerized patient record.      Review of Systems (at today's evaluation)  Review of Systems     Physical Exam:   /72   Ht 5' 7" (1.702 m)   Wt 62.4 kg (137 lb 9.1 oz)   BMI 21.55 kg/m²       Physical Exam       Assessment:        No diagnosis found.       Plan:      There are no diagnoses linked to this encounter.       No follow-ups on file.          The above was reviewed and discussed with the patient.    Annual exam and screening issues based on the patient's age, medical history and family history were reviewed / discussed.  Routine health maintenance issues were reviewed and discussed.      The patient's current birth control *** / HRT *** was discussed.  ***      The patient's questions were answered, and she is in agreement with the current plan.     Cody Hale MD  Department OBN  [facility]      "

## 2023-07-28 PROBLEM — R74.8 ELEVATED LIVER ENZYMES: Status: RESOLVED | Noted: 2018-04-29 | Resolved: 2023-07-28

## 2023-07-28 LAB
FINAL PATHOLOGIC DIAGNOSIS: NORMAL
Lab: NORMAL

## 2023-07-29 NOTE — PROGRESS NOTES
Chief Complaint   Patient presents with    [employer] Woman     F/U for U/S Results.       History and Physical:  No LMP recorded. Patient has had an ablation.    HRT:  Estradiol and micronized progesterone    Date:     Kamilla Montes is a 52 y.o.  who presents today for her routine annual GYN exam.   The patient does have a history of previous endometrial ablation.  This was approximally .  She recently noted some slight bleeding on 2 occasions but is unsure if this is secondary to vaginal bleeding or hemorrhoids.  She does have constipation which has worsened her hemorrhoidal issues.  Constipation has been a longstanding unchanged issue.  Colonoscopy was in 2022.    Mammogram was in May of 2023.    The patient reports no changes in her medical or surgical history since her last evaluation.    Date: 2023    The patient presents today for follow-up.  She was last seen as above.    In light of the questionable hemorrhoids versus postmenopausal bleeding a pelvic ultrasound was ordered and the patient presents today to discuss the results.      Allergies: Review of patient's allergies indicates:  No Known Allergies    Past Medical History:   Diagnosis Date    Chest pain, musculoskeletal        Past Surgical History:   Procedure Laterality Date    AUGMENTATION OF BREAST Bilateral     BLADDER SUSPENSION      COLONOSCOPY N/A 2022    Procedure: COLONOSCOPY;  Surgeon: Santiago Espinoza MD;  Location: [facility];  Service: Endoscopy;  Laterality: N/A;    ESOPHAGOGASTRODUODENOSCOPY      [facility]:   Current Outpatient Medications:     bimatoprost (LATISSE) 0.03 % ophthalmic solution, Place one drop on applicator and apply evenly along the skin of the upper eyelid at base of eyelashes once daily at bedtime; repeat procedure for second eye (use a clean applicator)., Disp: 5 mL, Rfl: 12    estradioL (ESTRACE) 1 MG tablet, Take 1 tablet (1 mg total) by mouth once daily., Disp: 30 tablet, Rfl:  11    finasteride (PROSCAR) 5 mg tablet, Take 1 tablet (5 mg total) by mouth once daily., Disp: 90 tablet, Rfl: 3    minoxidiL 5 % Foam, Apply half a capful to scalp QAM, Disp: 60 g, Rfl: 11    progesterone (PROMETRIUM) 100 MG capsule, Take 1 capsule (100 mg total) by mouth nightly., Disp: 90 capsule, Rfl: 3    tacrolimus (PROTOPIC) 0.1 % ointment, Apply thin film to anterior hairline/forehead once daily, Disp: 100 g, Rfl: 5    triamcinolone acetonide 0.1% (KENALOG) 0.1 % cream, Apply to affected areas of body BID prn rash. Do not use on face, underarms, or groin., Disp: 80 g, Rfl: 3    Current Facility-Administered Medications:     triamcinolone acetonide injection 10 mg, 10 mg, Intradermal, 1 time in Clinic/HOD, Jocelyn Real MD    triamcinolone acetonide injection 10 mg, 10 mg, Intradermal, 1 time in Clinic/HOD, Jocelyn Real MD     OB History          2    Para   2    Term   2            AB        Living   2         SAB        IAB        Ectopic        Multiple        Live Births               Obstetric Comments   Vaginal delivery x2               Social History     Socioeconomic History    Marital status:    Tobacco Use    Smoking status: Never    Smokeless tobacco: Never   Substance and Sexual Activity    Alcohol use: Yes     Alcohol/week: 1.0 standard drink     Types: 1 Glasses of wine per week     Comment: socially     Drug use: Never    Sexual activity: Yes     Birth control/protection: None, See Surgical Hx, Partner-Vasectomy       Family History   Problem Relation Age of Onset    Diabetes Mother      Past medical and surgical history reviewed.   I have reviewed the patient's medical history in detail and updated the computerized patient record.      Review of Systems (at today's evaluation)  Review of Systems   Constitutional:  Negative for fever and unexpected weight change.   Respiratory:  Negative for cough and shortness of breath.    Cardiovascular:  Negative for chest  "pain.   Gastrointestinal:  Positive for constipation. Negative for diarrhea and nausea.   Genitourinary:  Negative for dysuria and frequency.          GYN AS PER HPI   Musculoskeletal: Negative.    Skin: Negative.    Neurological: Negative.       Physical Exam:   /72   Ht 5' 7" (1.702 m)   Wt 62.4 kg (137 lb 9.1 oz)   BMI 21.55 kg/m²       Physical Exam:   Constitutional: She appears well-developed and well-nourished.    HENT:   Head: Normocephalic.     Neck: No thyroid mass present.    Cardiovascular:  Normal rate.             Pulmonary/Chest: Effort normal. No respiratory distress.        Abdominal: Soft. There is no abdominal tenderness.             Musculoskeletal: Normal range of motion.      Right lower leg: No edema.      Left lower leg: No edema.       Neurological: She is alert.   No gross lesions noted.    Skin: Skin is warm and dry.    Psychiatric: She has a normal mood and affect. Her speech is normal and behavior is normal. Mood normal.        Recent Radiology Results:    7/25/2023 Routine     Narrative & Impression  EXAMINATION:  US PELVIS COMP WITH TRANSVAG NON-OB (XPD)     CLINICAL HISTORY:  Postmenopausal bleeding       FINDINGS:  Uterus:     Size: 7.4 x 5.2 cm     Masses: A 1.4 cm, 1 cm and 0.9 cm fibroid are noted.  The whole fundus is heterogeneous suggesting multiple fibroids.     Endometrium: Normal in this Angelica menopausal patient, measuring 6.7 mm.     Right ovary:     Size: 2.2 x 1.3 cm     Appearance: Normal     Vascular flow: Normal.     Left ovary:     Size: 2.4 x 1.8 cm     Appearance: Normal     Vascular Flow: Normal.     Free Fluid:     None.     Impression:     Heterogeneous uterine fundus may be due to multiple fibroids.  Three fibroids are identified ranging in size from 9 mm up to 1.4 cm in size.    Assessment:        1. PMB (postmenopausal bleeding)         Plan:      PMB (postmenopausal bleeding)  -     US Pelvis Comp with Transvag NON-OB (xpd; Future; Expected date: " 10/27/2023       Follow up for as needed / for any GYN related issues.     The above was reviewed and discussed with the patient.    We discussed the significance of the uterine myomas as well as the endometrial cavity measurement of 6.7 mm.    We discussed the patient's previous endometrial ablation and the fact that definitive diagnosis can not be made based on ultrasound and evaluation of the atrial cavity may not be possible due to the previous ablation.  Options of attempts at office biopsy, D&C and repeat ultrasound were discussed as well as conservative management.  The pros cons risks benefits alternatives indications of each reviewed and ultimately we will proceed as follows:  A repeat ultrasound has been ordered for 3 months.    The patient will follow-up for any further episodes of bleeding as well as be seen evaluated by Gastroenterology.      The patient's questions regarding the above were answered and she is in agreement with the current plan    Cody Hale MD  Department OBGYN [facility]

## 2023-07-31 LAB
HPV HR 12 DNA SPEC QL NAA+PROBE: NEGATIVE
HPV16 AG SPEC QL: NEGATIVE
HPV18 DNA SPEC QL NAA+PROBE: NEGATIVE

## 2023-08-02 ENCOUNTER — PATIENT MESSAGE (OUTPATIENT)
Dept: GASTROENTEROLOGY | Facility: CLINIC | Age: 53
End: 2023-08-02
Payer: COMMERCIAL

## 2023-08-03 ENCOUNTER — TELEPHONE (OUTPATIENT)
Dept: OBSTETRICS AND GYNECOLOGY | Facility: CLINIC | Age: 53
End: 2023-08-03
Payer: COMMERCIAL

## 2023-10-27 ENCOUNTER — HOSPITAL ENCOUNTER (OUTPATIENT)
Dept: RADIOLOGY | Facility: HOSPITAL | Age: 53
Discharge: HOME | End: 2023-10-27
Attending: OBSTETRICS & GYNECOLOGY
Payer: COMMERCIAL

## 2023-10-27 DIAGNOSIS — N95.0 PMB (POSTMENOPAUSAL BLEEDING): ICD-10-CM

## 2023-10-27 RX ORDER — BIMATOPROST 3 UG/ML
SOLUTION TOPICAL
Qty: 5 ML | Refills: 12 | Status: SHIPPED | OUTPATIENT
Start: 2023-10-27

## 2023-11-21 ENCOUNTER — HOSPITAL ENCOUNTER (OUTPATIENT)
Dept: RADIOLOGY | Facility: HOSPITAL | Age: 53
Discharge: HOME | End: 2023-11-21
Attending: OBSTETRICS & GYNECOLOGY
Payer: COMMERCIAL

## 2023-11-21 PROCEDURE — 76830 TRANSVAGINAL US NON-OB: CPT | Mod: TC,PO

## 2023-11-21 PROCEDURE — 76830 US PELVIS COMP WITH TRANSVAG NON-OB (XPD): ICD-10-PCS | Mod: 26,,, | Performed by: RADIOLOGY

## 2023-11-21 PROCEDURE — 76856 US EXAM PELVIC COMPLETE: CPT | Mod: 26,,, | Performed by: RADIOLOGY

## 2023-11-21 PROCEDURE — 76830 TRANSVAGINAL US NON-OB: CPT | Mod: 26,,, | Performed by: RADIOLOGY

## 2023-11-21 PROCEDURE — 76856 US PELVIS COMP WITH TRANSVAG NON-OB (XPD): ICD-10-PCS | Mod: 26,,, | Performed by: RADIOLOGY

## 2024-04-03 ENCOUNTER — OFFICE VISIT (OUTPATIENT)
Dept: OBSTETRICS AND GYNECOLOGY | Facility: CLINIC | Age: 54
End: 2024-04-03
Payer: COMMERCIAL

## 2024-04-03 VITALS
WEIGHT: 137.56 LBS | BODY MASS INDEX: 21.59 KG/M2 | DIASTOLIC BLOOD PRESSURE: 60 MMHG | SYSTOLIC BLOOD PRESSURE: 100 MMHG | HEIGHT: 67 IN

## 2024-04-03 DIAGNOSIS — R35.0 URINARY FREQUENCY: ICD-10-CM

## 2024-04-03 DIAGNOSIS — Z12.31 ENCOUNTER FOR SCREENING MAMMOGRAM FOR MALIGNANT NEOPLASM OF BREAST: ICD-10-CM

## 2024-04-03 DIAGNOSIS — Z78.0 MENOPAUSE: ICD-10-CM

## 2024-04-03 DIAGNOSIS — N95.0 PMB (POSTMENOPAUSAL BLEEDING): Primary | ICD-10-CM

## 2024-04-03 LAB
BILIRUBIN, UA POC OHS: NEGATIVE
BLOOD, UA POC OHS: ABNORMAL
CLARITY, UA POC OHS: CLEAR
COLOR, UA POC OHS: YELLOW
GLUCOSE, UA POC OHS: NEGATIVE
KETONES, UA POC OHS: NEGATIVE
LEUKOCYTES, UA POC OHS: ABNORMAL
NITRITE, UA POC OHS: NEGATIVE
PH, UA POC OHS: 6
PROTEIN, UA POC OHS: NEGATIVE
SPECIFIC GRAVITY, UA POC OHS: >=1.03
UROBILINOGEN, UA POC OHS: 0.2

## 2024-04-03 PROCEDURE — 81003 URINALYSIS AUTO W/O SCOPE: CPT | Mod: QW,S$GLB,, | Performed by: OBSTETRICS & GYNECOLOGY

## 2024-04-03 PROCEDURE — 1159F MED LIST DOCD IN RCRD: CPT | Mod: CPTII,S$GLB,, | Performed by: OBSTETRICS & GYNECOLOGY

## 2024-04-03 PROCEDURE — 3008F BODY MASS INDEX DOCD: CPT | Mod: CPTII,S$GLB,, | Performed by: OBSTETRICS & GYNECOLOGY

## 2024-04-03 PROCEDURE — 3074F SYST BP LT 130 MM HG: CPT | Mod: CPTII,S$GLB,, | Performed by: OBSTETRICS & GYNECOLOGY

## 2024-04-03 PROCEDURE — 87086 URINE CULTURE/COLONY COUNT: CPT | Performed by: OBSTETRICS & GYNECOLOGY

## 2024-04-03 PROCEDURE — 3078F DIAST BP <80 MM HG: CPT | Mod: CPTII,S$GLB,, | Performed by: OBSTETRICS & GYNECOLOGY

## 2024-04-03 PROCEDURE — 99214 OFFICE O/P EST MOD 30 MIN: CPT | Mod: S$GLB,,, | Performed by: OBSTETRICS & GYNECOLOGY

## 2024-04-03 PROCEDURE — 99999 PR PBB SHADOW E&M-EST. PATIENT-LVL III: CPT | Mod: PBBFAC,,, | Performed by: OBSTETRICS & GYNECOLOGY

## 2024-04-03 RX ORDER — ESTRADIOL 1 MG/1
1 TABLET ORAL DAILY
Qty: 90 TABLET | Refills: 3 | Status: SHIPPED | OUTPATIENT
Start: 2024-04-03 | End: 2025-04-03

## 2024-04-03 RX ORDER — PROGESTERONE 100 MG/1
100 CAPSULE ORAL NIGHTLY
Qty: 90 CAPSULE | Refills: 3 | Status: SHIPPED | OUTPATIENT
Start: 2024-04-03 | End: 2025-04-03

## 2024-04-03 NOTE — PROGRESS NOTES
Chief Complaint   Patient presents with    Follow-up     U/S follow up ,urinary frequency        History and Physical:  No LMP recorded. Patient has had an ablation.    HRT:  Estradiol and micronized progesterone    Date: 4/3/2024    Kamilla Montes is a 53 y.o.    The patient was previously seen due to possible postmenopausal bleeding.  Pelvic ultrasound was ordered and the patient presents today for follow-up.  She denies any pelvic pain or postmenopausal bleeding since her last evaluation.      She does have continued worsening issues with urinary frequency.  She has undergone previous sling procedure for incontinence but now reports increasing urinary frequency requiring pad usage due to concerns regarding incontinence.    Allergies: Review of patient's allergies indicates:  No Known Allergies    Past Medical History:   Diagnosis Date    Chest pain, musculoskeletal        Past Surgical History:   Procedure Laterality Date    AUGMENTATION OF BREAST Bilateral     BLADDER SUSPENSION      COLONOSCOPY N/A 2022    Procedure: COLONOSCOPY;  Surgeon: Santiago Espinoza MD;  Location: [facility];  Service: Endoscopy;  Laterality: N/A;    [facility]OSCOPY      [facility]         MEDS:   Current Outpatient Medications:     bimatoprost (LATISSE) 0.03 % ophthalmic solution, Place one drop on applicator and apply evenly along the skin of the upper eyelid at base of eyelashes once daily at bedtime; repeat procedure for second eye (use a clean applicator)., Disp: 5 mL, Rfl: 12    finasteride (PROSCAR) 5 mg tablet, Take 1 tablet (5 mg total) by mouth once daily., Disp: 90 tablet, Rfl: 3    minoxidiL 5 % Foam, Apply half a capful to scalp QAM, Disp: 60 g, Rfl: 11    tacrolimus (PROTOPIC) 0.1 % ointment, Apply thin film to anterior hairline/forehead once daily, Disp: 100 g, Rfl: 5    triamcinolone acetonide 0.1% (KENALOG) 0.1 % cream, Apply to affected areas of body BID prn rash. Do not use on face, underarms, or  groin., Disp: 80 g, Rfl: 3    estradioL (ESTRACE) 1 MG tablet, Take 1 tablet (1 mg total) by mouth once daily., Disp: 90 tablet, Rfl: 3    progesterone (PROMETRIUM) 100 MG capsule, Take 1 capsule (100 mg total) by mouth nightly., Disp: 90 capsule, Rfl: 3    Current Facility-Administered Medications:     triamcinolone acetonide injection 10 mg, 10 mg, Intradermal, 1 time in Clinic/HOD, Jocelyn Real MD    triamcinolone acetonide injection 10 mg, 10 mg, Intradermal, 1 time in Clinic/HOD, Jocelyn Real MD     OB History          2    Para   2    Term   2            AB        Living   2         SAB        IAB        Ectopic        Multiple        Live Births               Obstetric Comments   Vaginal delivery x2               Social History     Socioeconomic History    Marital status:    Tobacco Use    Smoking status: Never    Smokeless tobacco: Never   Substance and Sexual Activity    Alcohol use: Yes     Alcohol/week: 1.0 standard drink of alcohol     Types: 1 Glasses of wine per week     Comment: socially     Drug use: Never    Sexual activity: Yes     Birth control/protection: None, See Surgical Hx, Partner-Vasectomy       Family History   Problem Relation Age of Onset    Diabetes Mother      Past medical and surgical history reviewed.   I have reviewed the patient's medical history in detail and updated the computerized patient record.      Review of Systems (at today's evaluation)  Review of Systems   Constitutional:  Negative for fever and unexpected weight change.   Respiratory:  Negative for cough and shortness of breath.    Cardiovascular:  Negative for chest pain.   Gastrointestinal:  Positive for constipation. Negative for diarrhea and nausea.   Genitourinary:  Positive for frequency. Negative for dysuria and hematuria.          GYN AS PER HPI   Musculoskeletal: Negative.    Skin: Negative.    Neurological: Negative.         Physical Exam:   /60 (BP Location: Right  "arm, Patient Position: Sitting, BP Method: Medium (Manual))   Ht 5' 7" (1.702 m)   Wt 62.4 kg (137 lb 9.1 oz)   BMI 21.55 kg/m²       Physical Exam:   Constitutional: She appears well-developed and well-nourished.    HENT:   Head: Normocephalic.     Neck: No thyroid mass present.    Cardiovascular:  Normal rate.             Pulmonary/Chest: Effort normal. No respiratory distress.        Abdominal: Soft. There is no abdominal tenderness.             Musculoskeletal: Normal range of motion.      Right lower leg: No edema.      Left lower leg: No edema.       Neurological: She is alert.   No gross lesions noted.    Skin: Skin is warm and dry.    Psychiatric: She has a normal mood and affect. Her speech is normal and behavior is normal. Mood normal.      Pap smear from 7/25/2023 noted no cervical cell abnormalities and was HPV negative.     Office urinalysis on 4/3/2024  Negative:  Glucose / Bilirubin / Ketones / Protein / Nitrites  POSITIVE:  BLOOD Small /  LEUKOCYTE ESTERASE Trace      Recent Radiology Results:    7/25/2023 Routine     Narrative & Impression  EXAMINATION:  US PELVIS COMP WITH TRANSVAG NON-OB (XPD)     CLINICAL HISTORY:  Postmenopausal bleeding    FINDINGS:  Uterus:     Size: 7.4 x 5.2 cm     Masses: A 1.4 cm, 1 cm and 0.9 cm fibroid are noted.  The whole fundus is heterogeneous suggesting multiple fibroids.     Endometrium: Normal in this Angelica menopausal patient, measuring 6.7 mm.     Right ovary:     Size: 2.2 x 1.3 cm     Appearance: Normal     Vascular flow: Normal.     Left ovary:     Size: 2.4 x 1.8 cm     Appearance: Normal     Vascular Flow: Normal.     Free Fluid:     None.     Impression:     Heterogeneous uterine fundus may be due to multiple fibroids.  Three fibroids are identified ranging in size from 9 mm up to 1.4 cm in size.    11/21/2023 Routine     Narrative & Impression  EXAMINATION:  US PELVIS COMP WITH TRANSVAG NON-OB (XPD)     CLINICAL HISTORY:  Postmenopausal bleeding   "   COMPARISON:  [id number]     FINDINGS:  Uterus:     Size: 7.3 cm     Masses: Multiple masses consistent with uterine fibroids, measured at 1.1 x 0.9 x 1.0 cm, 1.7 x 1.1 x 1.2 cm, 1.1 x 0.8 x 0.9 cm.  Previously remeasured at 1.4, 1 and 0.9 cm.  There is also heterogenicity of the closed and measures 4 mm     Right ovary:     Size: 2.6 x 1.7 x 2.1 cm     Appearance: Normal     Vascular flow: Normal.     Left ovary:     Size: 2.1 x 1.7 x 1.1 cm     Appearance: Normal     Vascular Flow: Normal.     Free Fluid:     None.     Impression:     Uterine fibroids.  Heterogenicity of the uterus suggesting additional fibroids as well.  Unremarkable appearance of the ovaries.  Findings similar to the prior exam.     Images reviewed.  Endometrial cavity appears to be 3.9 mm.    Assessment:        1. PMB (postmenopausal bleeding)    2. Encounter for screening mammogram for malignant neoplasm of breast    3. Urinary frequency    4. Menopause      Plan:      PMB (postmenopausal bleeding)    Encounter for screening mammogram for malignant neoplasm of breast  -     Mammo Digital Screening Bilat w/ [facility]; Future; Expected date: 04/03/2024    Urinary frequency  -     Ambulatory referral/consult to Urology; Future; Expected date: 04/10/2024  -     POCT Urinalysis(Instrument)  -     Urine culture    Menopause  -     estradioL (ESTRACE) 1 MG tablet; Take 1 tablet (1 mg total) by mouth once daily.  Dispense: 90 tablet; Refill: 3  -     progesterone (PROMETRIUM) 100 MG capsule; Take 1 capsule (100 mg total) by mouth nightly.  Dispense: 90 capsule; Refill: 3       Follow up for as needed / for any GYN related issues.     The above was reviewed and discussed with the patient.    We reviewed the findings on her follow-up ultrasound noting continuous myomatous uterus but a approximately 4 mm menstrual cavity.  The low likelihood of underlying pathology as well as the fact that there has been no recent postmenopausal bleeding reviewed discussed.    We  discussed the patient's history of bladder sling and issues with increasing urinary frequency.    Results of today's urinalysis were discussed and urine is being sent for culture and sensitivity.  Options reviewed and the patient is being referred to Urology for further evaluation and potential treatment.      From a menopausal standpoint the patient is currently doing well and refills on her HRT were provided.  The pros, cons, risks, benefits, alternatives and indications of the medication(s) prescribed, as well as appropriate use and potential side effects were discussed.  Oncologic and cardiovascular issues associated hormone replacement therapy were discussed.    Mammogram ordered.  The patient is up-to-date on colon cancer screening.    The patient's questions regarding the above were answered and she is in agreement with the current plan    Cody Hale MD  Department OBGYN  [facility]

## 2024-04-04 ENCOUNTER — OFFICE VISIT (OUTPATIENT)
Dept: UROLOGY | Facility: CLINIC | Age: 54
End: 2024-04-04
Payer: COMMERCIAL

## 2024-04-04 VITALS — BODY MASS INDEX: 21.59 KG/M2 | HEIGHT: 67 IN | WEIGHT: 137.56 LBS

## 2024-04-04 DIAGNOSIS — R35.0 URINARY FREQUENCY: ICD-10-CM

## 2024-04-04 DIAGNOSIS — N39.46 MIXED STRESS AND URGE URINARY INCONTINENCE: ICD-10-CM

## 2024-04-04 DIAGNOSIS — N32.81 OVERACTIVE BLADDER: Primary | ICD-10-CM

## 2024-04-04 LAB
BACTERIA #/AREA URNS AUTO: NORMAL /HPF
BACTERIA UR CULT: NO GROWTH
MICROSCOPIC COMMENT: NORMAL
RBC #/AREA URNS AUTO: 2 /HPF (ref 0–4)
SQUAMOUS #/AREA URNS AUTO: 0 /HPF
WBC #/AREA URNS AUTO: 1 /HPF (ref 0–5)

## 2024-04-04 PROCEDURE — 3008F BODY MASS INDEX DOCD: CPT | Mod: CPTII,S$GLB,, | Performed by: NURSE PRACTITIONER

## 2024-04-04 PROCEDURE — 99999 PR PBB SHADOW E&M-EST. PATIENT-LVL IV: CPT | Mod: PBBFAC,,, | Performed by: NURSE PRACTITIONER

## 2024-04-04 PROCEDURE — 1159F MED LIST DOCD IN RCRD: CPT | Mod: CPTII,S$GLB,, | Performed by: NURSE PRACTITIONER

## 2024-04-04 PROCEDURE — 87086 URINE CULTURE/COLONY COUNT: CPT | Performed by: NURSE PRACTITIONER

## 2024-04-04 PROCEDURE — 81001 URINALYSIS AUTO W/SCOPE: CPT | Performed by: NURSE PRACTITIONER

## 2024-04-04 PROCEDURE — 1160F RVW MEDS BY RX/DR IN RCRD: CPT | Mod: CPTII,S$GLB,, | Performed by: NURSE PRACTITIONER

## 2024-04-04 PROCEDURE — 99204 OFFICE O/P NEW MOD 45 MIN: CPT | Mod: 25,S$GLB,, | Performed by: NURSE PRACTITIONER

## 2024-04-04 PROCEDURE — 51701 INSERT BLADDER CATHETER: CPT | Mod: S$GLB,,, | Performed by: NURSE PRACTITIONER

## 2024-04-04 RX ORDER — MIRABEGRON 25 MG/1
25 TABLET, FILM COATED, EXTENDED RELEASE ORAL DAILY
Qty: 30 TABLET | Refills: 11 | Status: SHIPPED | OUTPATIENT
Start: 2024-04-04 | End: 2025-04-04

## 2024-04-04 NOTE — PROGRESS NOTES
[facility]  Group: Pedro/Mo/Gwen/Sonny  NPs: Portia Mehta/Dori Cross    Note today written by:Dori Cross  Date of Service: 04/04/2024      CHIEF COMPLAINT: Urinary frequency.    PRESENTING ILLNESS:    Kamilla Montes is a 53 y.o. female who presents for urinary frequency. This is her initial clinic visit    4/4/24  Pt c/o of urinary frequency and urgency that has been occurring for a while and gradually worsening.  Voids every 1.5-2 hours  She has to go immediately to bathroom once urge occurs or will start leaking.  Unsure if emptying her bladder  + Mixed urinary incontinence  NED with laughing, coughing  Started wearing pads  Denies dysuria, gross hematuria, flank pain, fever, chills, nausea or vomiting.  PVR IO cath: 30 ml    POCT UA yesterday sm blood, tr WBC    Takes estrace daily    Hx of bladder suspension, 2015, with mesh  No hx of hysterectomy    + Constipation, takes stool softener daily but does not always work well    History of kidney stones: denies  History of recurrent UTI: denies  Personal or family hx of  malignancy: denies  History of  trauma: denies  Smoking history: never smoked    Urine cultures:   Lab Results   Component Value Date    LABURIN No growth 04/28/2022         REVIEW OF SYSTEMS: as stated above in hpi    PATIENT HISTORY:  Past Medical History:   Diagnosis Date    Chest pain, musculoskeletal        Past Surgical History:   Procedure Laterality Date    AUGMENTATION OF BREAST Bilateral     BLADDER SUSPENSION      COLONOSCOPY N/A 6/13/2022    Procedure: COLONOSCOPY;  Surgeon: Santiago Espinoza MD;  Location: [facility];  Service: Endoscopy;  Laterality: N/A;    ESOPHAGOGASTRODUODENOSCOPY      [facility]         Allergies:  Patient has no known allergies.      PHYSICAL EXAMINATION:  Constitutional: She is oriented to person, place, and time. She appears well-developed and well-nourished.  She is in no apparent  distress.  Abdominal:  She exhibits no distension.  There is no CVA tenderness.   Neurological: She is alert and oriented to person, place, and time.   Psych: Cooperative with normal affect.    Genitourinary:  Normal external female genitalia  Urethral meatus is normal  No significant prolapse    Consent verbally obtained.  Betadine prep was applied to the urethral meatus. An in and out cath was performed after voiding.  The PVR was 30 ml.      Physical Exam      LABS:      Lab Results   Component Value Date    CREATININE 0.7 04/28/2022     Lab Results   Component Value Date    HGBA1C 5.4 04/28/2022         IMPRESSION:    Encounter Diagnoses   Name Primary?    Overactive bladder Yes    Urinary frequency     Mixed stress and urge urinary incontinence        PLAN:  -Catheterized urine sent for micro UA and culture  Will call with results    -Discussed OAB.  Pt is interested in starting medication for OAB symptoms  Discussed side effects and indications for myrbetriq. Prescription sent to the pharmacy. Pt verbalized understanding.  Want to avoid anti-cholinergics d/t chronic constipation    -Discussed conservative measures to control urgency and frequency including   1. Avoiding/minimizing bladder irritants (see below), especially in afternoon and evening hours  Discussed bladder irritants include coffee (even decaf), tea, alcohol, soda, spicy foods, acidic juices (orange, tomato), vinegar, and artificial sweeteners/sugary beverages.  2. timed voiding - empty on a schedule (approx ~2-3 hours) in spite of need to urinate, to get ahead of urge  3. dont postponing voiding - dont hold it on purpose   4. bowel regimen as distended bowel has extrinsic compressive effect on bladder.   - any or all of the following in any combination, titrate to soft daily bowel movement without pushing or straining  - colace/stool softener capsule - once to twice daily  - miralax - 1 capful daily to start, can increase to 2x daily (or  decrease to 1/2 cap daily)  - increase dietary fibery  - fiber supplements, such as metamucil  - prunes, prune juice  5. INCREASE water intake  6. Stop fluids 2 hours before bed, and urinate just before bed    -Kegels and pelvic floor exercises for NED.   If no improvement, can consider PT pelvic floor or bulkamid    -RTC 8 weeks      I encouraged her or any of her family members to call or email me with questions and/or concerns.      45 minutes of total time spent on the encounter, which includes face to face time and non-face to face time preparing to see the patient (eg, review of tests), Obtaining and/or reviewing separately obtained history, Documenting clinical information in the electronic or other health record, Independently interpreting results (not separately reported) and communicating results to the patient/family/caregiver, or Care coordination (not separately reported).

## 2024-04-05 LAB — BACTERIA UR CULT: NORMAL

## 2024-05-21 ENCOUNTER — HOSPITAL ENCOUNTER (OUTPATIENT)
Dept: RADIOLOGY | Facility: HOSPITAL | Age: 54
Discharge: HOME | End: 2024-05-21
Attending: OBSTETRICS & GYNECOLOGY
Payer: COMMERCIAL

## 2024-05-21 DIAGNOSIS — Z12.31 ENCOUNTER FOR SCREENING MAMMOGRAM FOR MALIGNANT NEOPLASM OF BREAST: ICD-10-CM

## 2024-05-21 PROCEDURE — 77067 SCR MAMMO BI INCL CAD: CPT | Mod: 26,,, | Performed by: RADIOLOGY

## 2024-05-21 PROCEDURE — 77063 BREAST TOMOSYNTHESIS BI: CPT | Mod: 26,,, | Performed by: RADIOLOGY

## 2024-05-21 PROCEDURE — 77063 BREAST TOMOSYNTHESIS BI: CPT | Mod: TC,PO

## 2024-05-31 ENCOUNTER — OFFICE VISIT (OUTPATIENT)
Dept: UROLOGY | Facility: CLINIC | Age: 54
End: 2024-05-31
Payer: COMMERCIAL

## 2024-05-31 VITALS — BODY MASS INDEX: 21.59 KG/M2 | HEIGHT: 67 IN | WEIGHT: 137.56 LBS

## 2024-05-31 DIAGNOSIS — N39.46 MIXED STRESS AND URGE URINARY INCONTINENCE: ICD-10-CM

## 2024-05-31 DIAGNOSIS — N32.81 OVERACTIVE BLADDER: Primary | ICD-10-CM

## 2024-05-31 LAB — POC RESIDUAL URINE VOLUME: 0 ML (ref 0–100)

## 2024-05-31 PROCEDURE — 3008F BODY MASS INDEX DOCD: CPT | Mod: CPTII,S$GLB,, | Performed by: NURSE PRACTITIONER

## 2024-05-31 PROCEDURE — 99214 OFFICE O/P EST MOD 30 MIN: CPT | Mod: S$GLB,,, | Performed by: NURSE PRACTITIONER

## 2024-05-31 PROCEDURE — 1159F MED LIST DOCD IN RCRD: CPT | Mod: CPTII,S$GLB,, | Performed by: NURSE PRACTITIONER

## 2024-05-31 PROCEDURE — 99999 PR PBB SHADOW E&M-EST. PATIENT-LVL IV: CPT | Mod: PBBFAC,,, | Performed by: NURSE PRACTITIONER

## 2024-05-31 PROCEDURE — 1160F RVW MEDS BY RX/DR IN RCRD: CPT | Mod: CPTII,S$GLB,, | Performed by: NURSE PRACTITIONER

## 2024-05-31 PROCEDURE — 51798 US URINE CAPACITY MEASURE: CPT | Mod: S$GLB,,, | Performed by: NURSE PRACTITIONER

## 2024-05-31 NOTE — PROGRESS NOTES
[facility]  Group: Pedro/Mo/Gwen/Sonny  NPs: Portia Mehta/Dori Cross    Note today written by:Dori Cross  Date of Service: 05/31/2024      CHIEF COMPLAINT: F/u urinary frequency.    PRESENTING ILLNESS:    Kamilla Montes is a 53 y.o. female who presents for f/u urinary frequency. Last clinic visit was 4/4/24 5/31/24  Pt presents today for f/u urinary frequency  Taking myrbetriq 50 mg  No significant improvement to OAB/mixed urinary incontinence with myrbetriq  Denies dysuria, gross hematuria, flank pain, fever, chills, nausea or vomiting  PVR 0 ml  + chronic constipation, last BM 6 days ago    4/4/24  Pt c/o of urinary frequency and urgency that has been occurring for a while and gradually worsening.  Voids every 1.5-2 hours  She has to go immediately to bathroom once urge occurs or will start leaking.  Unsure if emptying her bladder  + Mixed urinary incontinence  NED with laughing, coughing  Started wearing pads  Denies dysuria, gross hematuria, flank pain, fever, chills, nausea or vomiting.  PVR IO cath: 30 ml    POCT UA yesterday sm blood, tr WBC    Takes estrace daily    Hx of bladder suspension, 2015, with mesh  No hx of hysterectomy    + Constipation, takes stool softener daily but does not always work well    History of kidney stones: denies  History of recurrent UTI: denies  Personal or family hx of  malignancy: denies  History of  trauma: denies  Smoking history: never smoked    Urine cultures:   Lab Results   Component Value Date    LABURIN No significant growth 04/04/2024    LABURIN No growth 04/03/2024    LABURIN No growth 04/28/2022         REVIEW OF SYSTEMS: as stated above in hpi    PATIENT HISTORY:  Past Medical History:   Diagnosis Date    Chest pain, musculoskeletal        Past Surgical History:   Procedure Laterality Date    AUGMENTATION OF BREAST Bilateral     BLADDER SUSPENSION      COLONOSCOPY N/A 6/13/2022     Procedure: COLONOSCOPY;  Surgeon: Santiago Espinoza MD;  Location: [facility];  Service: Endoscopy;  Laterality: N/A;    ESOPHAGOGASTRODUODENOSCOPY      [facility]         Allergies:  Patient has no known allergies.      PHYSICAL EXAMINATION:  Constitutional: She is oriented to person, place, and time. She appears well-developed and well-nourished.  She is in no apparent distress.  Abdominal:  She exhibits no distension.  There is no CVA tenderness.   Neurological: She is alert and oriented to person, place, and time.   Psych: Cooperative with normal affect.      Physical Exam      LABS:      Lab Results   Component Value Date    CREATININE 0.7 04/28/2022     Lab Results   Component Value Date    HGBA1C 5.4 04/28/2022         IMPRESSION:    Encounter Diagnoses   Name Primary?    Overactive bladder Yes    Mixed stress and urge urinary incontinence          PLAN:  -Pt states myrbetriq is expensive and no significant improvement. Will discontinue.  Wants to avoid anti-cholinergics d/t side effects  Has chronic constipation  Will try PT pelvic floor first. Referral placed  If still no improvement with PT, plan to refer to Dr Rojas for bladder studies, alternate treatment options    -Continue conservative measures to control urgency and frequency including   1. Avoiding/minimizing bladder irritants (see below), especially in afternoon and evening hours  Discussed bladder irritants include coffee (even decaf), tea, alcohol, soda, spicy foods, acidic juices (orange, tomato), vinegar, and artificial sweeteners/sugary beverages.  2. timed voiding - empty on a schedule (approx ~2-3 hours) in spite of need to urinate, to get ahead of urge  3. dont postponing voiding - dont hold it on purpose   4. bowel regimen as distended bowel has extrinsic compressive effect on bladder.   - any or all of the following in any combination, titrate to soft daily bowel movement without pushing or straining  - colace/stool softener capsule -  once to twice daily  - miralax - 1 capful daily to start, can increase to 2x daily (or decrease to 1/2 cap daily)  - increase dietary fibery  - fiber supplements, such as metamucil  - prunes, prune juice  5. INCREASE water intake  6. Stop fluids 2 hours before bed, and urinate just before bed    -RTC 3 months    I encouraged her or any of her family members to call or email me with questions and/or concerns.      30 minutes of total time spent on the encounter, which includes face to face time and non-face to face time preparing to see the patient (eg, review of tests), Obtaining and/or reviewing separately obtained history, Documenting clinical information in the electronic or other health record, Independently interpreting results (not separately reported) and communicating results to the patient/family/caregiver, or Care coordination (not separately reported).

## 2024-06-10 ENCOUNTER — OFFICE VISIT (OUTPATIENT)
Dept: OTOLARYNGOLOGY | Facility: CLINIC | Age: 54
End: 2024-06-10
Payer: COMMERCIAL

## 2024-06-10 VITALS
HEART RATE: 63 BPM | BODY MASS INDEX: 21.77 KG/M2 | DIASTOLIC BLOOD PRESSURE: 73 MMHG | SYSTOLIC BLOOD PRESSURE: 119 MMHG | WEIGHT: 138.69 LBS | HEIGHT: 67 IN

## 2024-06-10 DIAGNOSIS — H69.93 DYSFUNCTION OF BOTH EUSTACHIAN TUBES: Primary | ICD-10-CM

## 2024-06-10 DIAGNOSIS — R59.0 ANTERIOR CERVICAL LYMPHADENOPATHY: ICD-10-CM

## 2024-06-10 DIAGNOSIS — J03.90 TONSILLITIS: ICD-10-CM

## 2024-06-10 LAB — GROUP A STREP, MOLECULAR: NEGATIVE

## 2024-06-10 PROCEDURE — 1160F RVW MEDS BY RX/DR IN RCRD: CPT | Mod: CPTII,S$GLB,, | Performed by: PHYSICIAN ASSISTANT

## 2024-06-10 PROCEDURE — 87651 STREP A DNA AMP PROBE: CPT | Performed by: PHYSICIAN ASSISTANT

## 2024-06-10 PROCEDURE — 99999 PR PBB SHADOW E&M-EST. PATIENT-LVL IV: CPT | Mod: PBBFAC,,, | Performed by: PHYSICIAN ASSISTANT

## 2024-06-10 PROCEDURE — 87070 CULTURE OTHR SPECIMN AEROBIC: CPT | Performed by: PHYSICIAN ASSISTANT

## 2024-06-10 PROCEDURE — 99213 OFFICE O/P EST LOW 20 MIN: CPT | Mod: S$GLB,,, | Performed by: PHYSICIAN ASSISTANT

## 2024-06-10 PROCEDURE — 3078F DIAST BP <80 MM HG: CPT | Mod: CPTII,S$GLB,, | Performed by: PHYSICIAN ASSISTANT

## 2024-06-10 PROCEDURE — 3074F SYST BP LT 130 MM HG: CPT | Mod: CPTII,S$GLB,, | Performed by: PHYSICIAN ASSISTANT

## 2024-06-10 PROCEDURE — 1159F MED LIST DOCD IN RCRD: CPT | Mod: CPTII,S$GLB,, | Performed by: PHYSICIAN ASSISTANT

## 2024-06-10 PROCEDURE — 3008F BODY MASS INDEX DOCD: CPT | Mod: CPTII,S$GLB,, | Performed by: PHYSICIAN ASSISTANT

## 2024-06-10 RX ORDER — AZELASTINE 1 MG/ML
1 SPRAY, METERED NASAL 2 TIMES DAILY
Qty: 30 ML | Refills: 2 | Status: SHIPPED | OUTPATIENT
Start: 2024-06-10 | End: 2025-06-10

## 2024-06-10 RX ORDER — METHYLPREDNISOLONE 4 MG/1
TABLET ORAL
Qty: 21 EACH | Refills: 0 | Status: SHIPPED | OUTPATIENT
Start: 2024-06-10 | End: 2024-07-01

## 2024-06-10 RX ORDER — FLUTICASONE PROPIONATE 50 MCG
1 SPRAY, SUSPENSION (ML) NASAL 2 TIMES DAILY
Qty: 16 G | Refills: 2 | Status: SHIPPED | OUTPATIENT
Start: 2024-06-10

## 2024-06-10 RX ORDER — AMOXICILLIN AND CLAVULANATE POTASSIUM 875; 125 MG/1; MG/1
1 TABLET, FILM COATED ORAL EVERY 12 HOURS
Qty: 20 TABLET | Refills: 0 | Status: SHIPPED | OUTPATIENT
Start: 2024-06-10 | End: 2024-06-20

## 2024-06-10 NOTE — PATIENT INSTRUCTIONS
Disp Refills Start End MAAME   amoxicillin-clavulanate 875-125mg (AUGMENTIN) 875-125 mg per tablet 20 tablet 0 6/10/2024 6/20/2024 No   Sig - Route: Take 1 tablet by mouth every 12 (twelve) hours. for 10 days - Oral       Use flonase 1 spray to each nostril twice daily and astelin 1 spray to each nostril twice daily for 1 month. Pop ears gently (nasal valsalva) for 3 seconds 6 times a day for 1 month to help with eustachian tube dysfunction.     Mix 1/2 tsp of baking soda and 1/2 tsp of salt into an 8oz glass of water. Rinse, gargle and spit 3 times a day for 1 week.     Alternate tylenol and ibuprofen every 4 hours as needed for pain. Do not exceed the maximum daily dosage on the back of the label.

## 2024-06-10 NOTE — PROGRESS NOTES
[facility]    Subjective:      Patient: Kamilla Montes Patient PCP: Lavonne, Primary Doctor         :  1970     Sex:  female      MRN:  [id number]          Date of Visit: 06/10/2024      Chief Complaint: Tonsillitis    Patient ID: Kamilla Montes is a 53 y.o. female non-smoker who presents to clinic for evaluation of right-sided neck swelling and bilateral aural fullness. Pt states that around 1 week ago, she noticed a growth on the right side of her neck. She then developed sore throat with odynophagia/dysphagia. Pt states that she has noticed inflammation to the right side of her throat. Pt has also had issues with bilateral aural fullness and muffled hearing for the past 3-4 days. Pt denies fever/chills. Pt states that she has not had significant issues with throat infections in the past.     Past Medical History  She has a past medical history of Chest pain, musculoskeletal.    Family History  Her family history includes Diabetes in her mother.    Past Surgical History:   Procedure Laterality Date    AUGMENTATION OF BREAST Bilateral     BLADDER SUSPENSION      COLONOSCOPY N/A 2022    Procedure: COLONOSCOPY;  Surgeon: Santiago Espinoza MD;  Location: [facility];  Service: Endoscopy;  Laterality: N/A;    ESOPHAGOGASTRODUODENOSCOPY      Novasure       Social History     Tobacco Use    Smoking status: Never    Smokeless tobacco: Never   Substance and Sexual Activity    Alcohol use: Yes     Alcohol/week: 1.0 standard drink of alcohol     Types: 1 Glasses of wine per week     Comment: socially     Drug use: Never    Sexual activity: Yes     Birth control/protection: None, See Surgical Hx, Partner-Vasectomy     Medications  She has a current medication list which includes the following prescription(s): bimatoprost, estradiol, finasteride, minoxidil, myrbetriq, progesterone, tacrolimus, triamcinolone acetonide 0.1%, amoxicillin-clavulanate 875-125mg, azelastine, fluticasone propionate, and methylprednisolone, and  "the following Facility-Administered Medications: triamcinolone acetonide and triamcinolone acetonide.    Review of patient's allergies indicates:  No Known Allergies  All medications, allergies, and past history have been reviewed.    Objective:      Vitals:      4/4/2024     9:16 AM 5/31/2024     8:59 AM 6/10/2024     1:11 PM   Vitals - 1 value per visit   SYSTOLIC   119   DIASTOLIC   73   Pulse   63   Weight (lb) 137.57 137.57 138.67   Weight (kg) 62.4 62.4 62.9   Height 5' 7" (1.702 m) 5' 7" (1.702 m) 5' 7" (1.702 m)   BMI (Calculated) 21.5 21.5 21.7   Pain Score Zero Zero Four       Body surface area is 1.72 meters squared.  Physical Exam  Constitutional:       General: She is not in acute distress.     Appearance: Normal appearance. She is not ill-appearing.   HENT:      Head: Normocephalic and atraumatic.      Right Ear: Tympanic membrane, ear canal and external ear normal.      Left Ear: Tympanic membrane, ear canal and external ear normal.      Nose: Nose normal.      Mouth/Throat:      Lips: Pink. No lesions.      Mouth: Mucous membranes are moist. No oral lesions.      Tongue: No lesions.      Palate: No lesions.      Pharynx: Uvula midline. Pharyngeal swelling (right) and posterior oropharyngeal erythema (right) present. No oropharyngeal exudate or uvula swelling.      Tonsils: Tonsillar exudate (white exudate on right palatine tonsil) present. 2+ on the right. 1+ on the left.      Comments: Right palatine tonsil with edema and erythema.   Eyes:      General:         Right eye: No discharge.         Left eye: No discharge.      Extraocular Movements: Extraocular movements intact.      Conjunctiva/sclera: Conjunctivae normal.   Pulmonary:      Effort: Pulmonary effort is normal.   Lymphadenopathy:      Cervical: Cervical adenopathy (firm right anterior level II cervical lymph node) present.   Neurological:      General: No focal deficit present.      Mental Status: She is alert and oriented to person, " place, and time. Mental status is at baseline.   Psychiatric:         Mood and Affect: Mood normal.         Behavior: Behavior normal.         Thought Content: Thought content normal.         Judgment: Judgment normal.       Labs:  WBC   Date Value Ref Range Status   07/07/2022 4.74 3.90 - 12.70 K/uL Final     Eosinophil %   Date Value Ref Range Status   07/07/2022 2.3 0.0 - 8.0 % Final     Eos #   Date Value Ref Range Status   07/07/2022 0.1 0.0 - 0.5 K/uL Final     Platelets   Date Value Ref Range Status   07/07/2022 328 150 - 450 K/uL Final     Glucose   Date Value Ref Range Status   04/28/2022 97 70 - 110 mg/dL Final     All lab results, imaging results, and data have been reviewed.    Assessment:        ICD-10-CM ICD-9-CM   1. Dysfunction of both eustachian tubes  H69.93 381.81   2. Tonsillitis  J03.90 463   3. Anterior cervical lymphadenopathy  R59.0 785.6            Plan:      Rapid strep and throat culture obtained due to acute pharyngitis/tonsillitis. Due to white tonsillar/peritonsillar exudate with erythema of right palatine tonsil and right oropharynx along with right anterior cervical lymphadenopathy, will empirically treat pt with Augmentin 875mg BID x 10 days. Pt is to also start medrol dosepack 4mg. Mix 1/2 tsp of baking soda and 1/2 tsp of salt into an 8oz glass of water. Rinse, gargle and spit 3 times a day for 1 week. Alternate tylenol and ibuprofen every 4 hours as needed for pain. Do not exceed the maximum daily dosage on the back of the label. Pt advised about ETD in setting of current illness. Use flonase 1 spray to each nostril twice daily and astelin 1 spray to each nostril twice daily for 1 month. Pop ears gently (nasal valsalva) for 3 seconds 6 times a day for 1 month to help with eustachian tube dysfunction. Pt is to follow up in 2-3 weeks to ensure that tonsillitis is resolving with above management.

## 2024-06-13 LAB — BACTERIA THROAT CULT: NORMAL

## 2024-07-02 ENCOUNTER — OFFICE VISIT (OUTPATIENT)
Dept: OTOLARYNGOLOGY | Facility: CLINIC | Age: 54
End: 2024-07-02
Payer: COMMERCIAL

## 2024-07-02 VITALS
WEIGHT: 136.88 LBS | HEART RATE: 82 BPM | DIASTOLIC BLOOD PRESSURE: 70 MMHG | BODY MASS INDEX: 21.48 KG/M2 | SYSTOLIC BLOOD PRESSURE: 111 MMHG | HEIGHT: 67 IN

## 2024-07-02 DIAGNOSIS — R59.0 ANTERIOR CERVICAL LYMPHADENOPATHY: Primary | ICD-10-CM

## 2024-07-02 DIAGNOSIS — R59.0 LOCALIZED ENLARGED LYMPH NODES: ICD-10-CM

## 2024-07-02 PROCEDURE — 99213 OFFICE O/P EST LOW 20 MIN: CPT | Mod: S$GLB,,, | Performed by: PHYSICIAN ASSISTANT

## 2024-07-02 PROCEDURE — 99999 PR PBB SHADOW E&M-EST. PATIENT-LVL IV: CPT | Mod: PBBFAC,,, | Performed by: PHYSICIAN ASSISTANT

## 2024-07-02 PROCEDURE — 3008F BODY MASS INDEX DOCD: CPT | Mod: CPTII,S$GLB,, | Performed by: PHYSICIAN ASSISTANT

## 2024-07-02 PROCEDURE — 3078F DIAST BP <80 MM HG: CPT | Mod: CPTII,S$GLB,, | Performed by: PHYSICIAN ASSISTANT

## 2024-07-02 PROCEDURE — 1160F RVW MEDS BY RX/DR IN RCRD: CPT | Mod: CPTII,S$GLB,, | Performed by: PHYSICIAN ASSISTANT

## 2024-07-02 PROCEDURE — 3074F SYST BP LT 130 MM HG: CPT | Mod: CPTII,S$GLB,, | Performed by: PHYSICIAN ASSISTANT

## 2024-07-02 PROCEDURE — 1159F MED LIST DOCD IN RCRD: CPT | Mod: CPTII,S$GLB,, | Performed by: PHYSICIAN ASSISTANT

## 2024-07-02 NOTE — PROGRESS NOTES
[facility]    Subjective:      Patient: Kamilla Montes Patient PCP: Lavonne, Primary Doctor         :  1970     Sex:  female      MRN:  [id number]          Date of Visit: 2024      Chief Complaint: Tonsillitis follow up    Interval History 2024: Pt states that she is no longer having throat pain. She states that she is feeling well after completing Augmentin and medrol 4mg dosepak. Pt states that she has been having abnormal scalp pain on the top of her scalp that moved from left to right side. She was recently diagnosed with iron deficiency and plans on getting OTC oral iron. Pt states that her throat symptoms have resolved, but she continues to have right cervical lymph node enlargement. Pt denies fever/chills, sore throat, dysphagia/odynophagia or voice change.     Plan from OV 06/10/2024:    Rapid strep and throat culture obtained due to acute pharyngitis/tonsillitis. Due to white tonsillar/peritonsillar exudate with erythema of right palatine tonsil and right oropharynx along with right anterior cervical lymphadenopathy, will empirically treat pt with Augmentin 875mg BID x 10 days. Pt is to also start medrol dosepack 4mg. Mix 1/2 tsp of baking soda and 1/2 tsp of salt into an 8oz glass of water. Rinse, gargle and spit 3 times a day for 1 week. Alternate tylenol and ibuprofen every 4 hours as needed for pain. Do not exceed the maximum daily dosage on the back of the label. Pt advised about ETD in setting of current illness. Use flonase 1 spray to each nostril twice daily and astelin 1 spray to each nostril twice daily for 1 month. Pop ears gently (nasal valsalva) for 3 seconds 6 times a day for 1 month to help with eustachian tube dysfunction. Pt is to follow up in 2-3 weeks to ensure that tonsillitis is resolving with above management.     Patient ID 06/10/2024: Kamilla Montes is a 53 y.o. female non-smoker who presents to clinic for evaluation of right-sided neck swelling and bilateral aural  fullness. Pt states that around 1 week ago, she noticed a growth on the right side of her neck. She then developed sore throat with odynophagia/dysphagia. Pt states that she has noticed inflammation to the right side of her throat. Pt has also had issues with bilateral aural fullness and muffled hearing for the past 3-4 days. Pt denies fever/chills. Pt states that she has not had significant issues with throat infections in the past.     Past Medical History  She has a past medical history of Chest pain, musculoskeletal.    Family History  Her family history includes Diabetes in her mother.    Past Surgical History:   Procedure Laterality Date    AUGMENTATION OF BREAST Bilateral     BLADDER SUSPENSION      COLONOSCOPY N/A 6/13/2022    Procedure: COLONOSCOPY;  Surgeon: Santiago Espinoza MD;  Location: [facility];  Service: Endoscopy;  Laterality: N/A;    ESOPHAGOGASTRODUODENOSCOPY      Novasure       Social History     Tobacco Use    Smoking status: Never    Smokeless tobacco: Never   Substance and Sexual Activity    Alcohol use: Yes     Alcohol/week: 1.0 standard drink of alcohol     Types: 1 Glasses of wine per week     Comment: socially     Drug use: Never    Sexual activity: Yes     Birth control/protection: None, See Surgical Hx, Partner-Vasectomy     Medications  She has a current medication list which includes the following prescription(s): azelastine, bimatoprost, estradiol, fluticasone propionate, minoxidil, myrbetriq, progesterone, tacrolimus, triamcinolone acetonide 0.1%, and finasteride, and the following Facility-Administered Medications: triamcinolone acetonide and triamcinolone acetonide.    Review of patient's allergies indicates:  No Known Allergies  All medications, allergies, and past history have been reviewed.    Objective:      Vitals:      5/31/2024     8:59 AM 6/10/2024     1:11 PM 7/2/2024     1:15 PM   Vitals - 1 value per visit   SYSTOLIC  119 111   DIASTOLIC  73 70   Pulse  63 82   Weight  "(lb) 137.57 138.67 136.91   Weight (kg) 62.4 62.9 62.1   Height 5' 7" (1.702 m) 5' 7" (1.702 m) 5' 7" (1.702 m)   BMI (Calculated) 21.5 21.7 21.4   Pain Score Zero Four Three       Body surface area is 1.71 meters squared.  Physical Exam  Constitutional:       General: She is not in acute distress.     Appearance: Normal appearance. She is not ill-appearing.   HENT:      Head: Normocephalic and atraumatic.      Right Ear: Tympanic membrane, ear canal and external ear normal.      Left Ear: Tympanic membrane, ear canal and external ear normal.      Nose: Nose normal.      Mouth/Throat:      Lips: Pink. No lesions.      Mouth: Mucous membranes are moist. No oral lesions.      Tongue: No lesions.      Palate: No lesions.      Pharynx: Uvula midline. No pharyngeal swelling, oropharyngeal exudate, posterior oropharyngeal erythema (right) or uvula swelling.      Tonsils: No tonsillar exudate or tonsillar abscesses. 1+ on the right. 1+ on the left.   Eyes:      General:         Right eye: No discharge.         Left eye: No discharge.      Extraocular Movements: Extraocular movements intact.      Conjunctiva/sclera: Conjunctivae normal.   Pulmonary:      Effort: Pulmonary effort is normal.   Lymphadenopathy:      Cervical: Cervical adenopathy (firm right anterior level II cervical lymph node) present.   Neurological:      General: No focal deficit present.      Mental Status: She is alert and oriented to person, place, and time. Mental status is at baseline.   Psychiatric:         Mood and Affect: Mood normal.         Behavior: Behavior normal.         Thought Content: Thought content normal.         Judgment: Judgment normal.       Labs:  WBC   Date Value Ref Range Status   07/07/2022 4.74 3.90 - 12.70 K/uL Final     Eosinophil %   Date Value Ref Range Status   07/07/2022 2.3 0.0 - 8.0 % Final     Eos #   Date Value Ref Range Status   07/07/2022 0.1 0.0 - 0.5 K/uL Final     Platelets   Date Value Ref Range Status "   07/07/2022 328 150 - 450 K/uL Final     Glucose   Date Value Ref Range Status   04/28/2022 97 70 - 110 mg/dL Final     All lab results, imaging results, and data have been reviewed.    Assessment:        ICD-10-CM ICD-9-CM   1. Anterior cervical lymphadenopathy  R59.0 785.6   2. Localized enlarged lymph nodes  R59.0 785.6         Plan:      Tonsillitis symptoms have resolved. Pt continues to have firm right level 2 anterior cervical lymph node enlargement. Proceed with CT soft tissue neck W contrast to further assess. If ongoing, will get FNA of right level 2 lymph node and create follow up dependant upon results. Pt is to take OTC oral iron as she has been having abnormal scalp pain on the top of her head. Our office will reach out with results and recommendation of CT soft tissue neck W contrast.

## 2024-07-08 ENCOUNTER — PATIENT MESSAGE (OUTPATIENT)
Dept: OTOLARYNGOLOGY | Facility: CLINIC | Age: 54
End: 2024-07-08
Payer: COMMERCIAL

## 2024-07-08 ENCOUNTER — HOSPITAL ENCOUNTER (OUTPATIENT)
Dept: RADIOLOGY | Facility: HOSPITAL | Age: 54
Discharge: HOME | End: 2024-07-08
Attending: PHYSICIAN ASSISTANT
Payer: COMMERCIAL

## 2024-07-08 DIAGNOSIS — R59.0 LOCALIZED ENLARGED LYMPH NODES: ICD-10-CM

## 2024-07-08 PROCEDURE — 82565 ASSAY OF CREATININE: CPT | Mod: PO

## 2024-07-08 PROCEDURE — 25500020 PHARM REV CODE 255: Mod: PO | Performed by: PHYSICIAN ASSISTANT

## 2024-07-08 PROCEDURE — 70491 CT SOFT TISSUE NECK W/DYE: CPT | Mod: 26,,, | Performed by: RADIOLOGY

## 2024-07-08 RX ADMIN — IOHEXOL 100 ML: 350 INJECTION, SOLUTION INTRAVENOUS at 02:07

## 2024-07-09 ENCOUNTER — PATIENT MESSAGE (OUTPATIENT)
Dept: OTOLARYNGOLOGY | Facility: CLINIC | Age: 54
End: 2024-07-09
Payer: COMMERCIAL

## 2024-07-09 ENCOUNTER — TELEPHONE (OUTPATIENT)
Dept: OTOLARYNGOLOGY | Facility: CLINIC | Age: 54
End: 2024-07-09
Payer: COMMERCIAL

## 2024-07-09 DIAGNOSIS — R59.0 CERVICAL LYMPHADENOPATHY: Primary | ICD-10-CM

## 2024-07-09 DIAGNOSIS — J39.2 THROAT MASS: ICD-10-CM

## 2024-07-09 NOTE — TELEPHONE ENCOUNTER
Advised pt of right palatine tonsil mass with anterior cervical lymphadenopathy. She denies fever/chills, sore throat, dysphagia or odynophagia. Will proceed with FNA and have pt follow up with head and neck ENT Dr. Yusuf Gray.

## 2024-07-10 ENCOUNTER — TELEPHONE (OUTPATIENT)
Dept: OTOLARYNGOLOGY | Facility: CLINIC | Age: 54
End: 2024-07-10
Payer: COMMERCIAL

## 2024-07-10 NOTE — TELEPHONE ENCOUNTER
Spoke w/pt regarding staff msg received.  W/pt, r/s MD Isaac appt appropriately since it was scheduled before FNA.  New ENT appt w/Dr. Gray is scheduled for 07/29 @ 1942.

## 2024-07-10 NOTE — TELEPHONE ENCOUNTER
----- Message from Clifford Butterfield PA-C sent at 7/9/2024  4:18 PM CDT -----  Regarding: Right palatine tonsil mass with cervical lymphadenopathy  Orders place for US guided FNA. Please have pt scheduled with Dr. Gray to further assess. Orders placed. Thank you!

## 2024-07-16 ENCOUNTER — CLINICAL SUPPORT (OUTPATIENT)
Dept: REHABILITATION | Facility: HOSPITAL | Age: 54
End: 2024-07-16
Payer: COMMERCIAL

## 2024-07-16 DIAGNOSIS — N39.46 MIXED STRESS AND URGE URINARY INCONTINENCE: ICD-10-CM

## 2024-07-16 DIAGNOSIS — N32.81 OVERACTIVE BLADDER: ICD-10-CM

## 2024-07-16 DIAGNOSIS — M62.89 PELVIC FLOOR DYSFUNCTION: Primary | ICD-10-CM

## 2024-07-16 PROCEDURE — 97530 THERAPEUTIC ACTIVITIES: CPT | Mod: PO

## 2024-07-16 PROCEDURE — 97161 PT EVAL LOW COMPLEX 20 MIN: CPT | Mod: PO

## 2024-07-16 NOTE — PATIENT INSTRUCTIONS
"Home Exercise Program: 07/16/2024    Home Exercise Program: 07/16/2024    Home Exercise Program: 07/16/2024    DIAPHRAGMATIC BREATHING     The diaphragm is a dome shaped muscle that forms the floor of the rib cage. It is the most efficient muscle for breathing and relaxation, although most people are not used to using the diaphragm. Diaphragmatic or belly breathing is an important technique to learn because it helps settle down or relax the autonomic nervous system. The correct use of diaphragmatic breathing can help to quiet brain activity resulting in the relaxation of all the muscles and organs of the body. This is accomplished by slow rhythmic breathing concentrated in the diaphragm muscle rather than the chest.    How to do proper relaxation breathing:    Start by lying on your back or reclining in a chair in a relaxed position. Place one hand on your chest and the other on your abdomen.  Relax your jaw by placing your tongue on the bottom of your mouth and keeping your teeth slightly apart.   Take a deep breath in, letting the abdomen expand and rise while you keep your upper chest, neck and shoulders relaxed.   As you breathe out, allow your abdomen and chest to fall. Exhale completely.  It doesn't matter if you breathe in/out through your nose and/or mouth. Do whichever feels comfortable.  Remember to breathe slowly.  Do not force your breathing. Do not hold your breath.  Repeat for 5-10 minutes every day.          "Roll for Control"  Strategies to Delay Voiding    What to do when you feel like you "gotta go gotta go!"     Stop what you are doing.    Take a few good deep breaths (this settles down your nervous system and relaxes your bladder).    WHILE YOU CONTINUE DEEP BREATHING, activate your pelvic floor by performing several quick contractions and releases.  (Pelvic floor contractions send a message to the bladder to relax and hold urine.)  Sitting: Roll thigh in and out slowly or squeeze knees " "together  Standing: Roll thigh in/out slowly and gently    As you do the above, you can also count backwards from 100 (to help get your mind off the urge!).       After the urge to void has quietly, you may be able to process with your activity OR if it has been approximately 3 hours since you've voided, you may choose to go to the bathroom and void.        Remember......"Control your bladder before it controls YOU!"        Home Exercise Program: 07/16/2024    DOUBLE VOIDING - Double voiding is a technique that may assist the bladder to empty more effectively when  urine is left in the bladder. It involves passing  urine more than once each time that you go to  the toilet. This makes sure that the bladder is  completely empty.    Here are 3 strategies you can try to fully empty your bladder.  You do not have to do all of these things every single time. Find which ones work best for you.     Check to make sure your pelvic floor is relaxed   Do a body scan - make sure your legs, buttocks, and abdominals are relaxed.  Take a couple deep, slow breaths to encourage your pelvic floor muscles to DROP (ie. Try Diaphragmatic Breathing).  Gently apply pressure over your bladder.  Change your pelvic position: lean forward, rock your pelvis forward and backward, stand up then sit back down.     Wait at least 30 seconds to 1 minute to see if a second urine stream begins.     Do not stop your urine stream or push/strain!      1) Bowel function - consider the following recommendations for optimizing bowel function. Good bowel health is important for overall pelvic floor function.     Adequate fluid intake is necessary for proper bowel function. Goal this week is to drink 64 ounces/day. Try completing your water bottle one time by lunch and second bottle by dinner time.     Fiber adds bulk to stool and promotes more frequent and regular bowel movements. Strategies to increase fiber intake:  One cup of high fiber cereal every " "day.  1/2 to 1 cup of prune juice or several stewed prunes every day, followed by a cup of hot water or tea.   2 cups of fruit or 2 1/2 cups vegetables, 6-8 oz high fiber grains daily  Fiber supplements, I.e. Metamucil, Citrucel, Konsyl, Benefiber  2 tablespoons of flax seed meal (can be added to cereal, smoothies, yogurt, oatmeal, etc)     Positioning and techniques for elimination     Bowel Movement Mechanics  1. Sit on the toilet comfortably with legs and buttocks relaxed.  2. Put your feet on a waste basket or squatty potty  3. Lean forward while keeping your back straight, forearms rest on knees.  4. Keep your knees apart.  5. Fully relax pelvic floor muscles - think about softening, opening, dropping  6. Use gentle belly breaths and bulge lower abdominals like making a beer belly  7. Keep belly big on exhalation  8. Exhale like blowing out birthday candles  9. Keep breathing like this through entire bowel movement  10. Make sure to give enough time, ok for it to take several minutes     Do not strain and Do not hold your breath.       (Search "Squatty Potty" on [employer])       Bowel Massage -  Try performing this massage to your abdomen every morning before getting out of bed. Try to do it for 3-5 minutes. Follow with some nice deep breaths and drink a glass of warm water afterwards.      "

## 2024-07-20 PROBLEM — M62.89 PELVIC FLOOR DYSFUNCTION: Status: ACTIVE | Noted: 2024-07-20

## 2024-07-21 NOTE — PLAN OF CARE
[facility]   Physical Therapy Initial Evaluation        Date: 2024   Name: Kamilla SEPULVEDA [facility] Number: [id number]    Therapy Diagnosis:   Encounter Diagnoses   Name Primary?    Overactive bladder     Mixed stress and urge urinary incontinence     Pelvic floor dysfunction Yes     Physician: Dori Cross NP    Physician Orders: PT Eval and Treat   Medical Diagnosis from Referral: Overactive bladder [N32.81], Mixed stress and urge urinary incontinence [N39.46]   Evaluation Date: 2024  Authorization Period Expiration: 24  Plan of Care Expiration: 10/8/2024  Progress Note Due: 2024  Visit # / Visits authorized:    FOTO: 1/3      Precautions: Standard & abnormal cells in cervical region with biopsy-currently being tested     Time In: 11:36 am  Time Out: 12:15 pm  Total Appointment Time (timed & untimed codes): 39 minutes    SUBJECTIVE       Date of onset: years of leakage    History of current condition - Kamilla reports: she had a sling surgery several years ago (May 18, 2017) and has had increased urinary leakage. She additionally did the Novasure Ablation. She has been taking magnesium for her bowel movements without success. She started iron pills due to low iron one week ago.     OB/GYN History:    vaginal delivery; stitches back to the anus with first baby. No issues with the second delivery.    Hysterectomy? No  Oophorectomy? No  Using vaginal estrogen cream: No  Sexually active? Yes  Pelvic Pain with: none reported    Bladder/Bowel History:   Frequency of urination:   Daytime: every 1-2 hours            Nighttime: 2x/night   Difficulty initiating urine stream: No  Urine stream: strong  Complete emptying: No  Post-micturition dribble? Yes  Bladder leakage: Yes  Activities that cause leakage: urgency and NED  Frequency of incidents: daily- few times a day   Amount leaked (urine): few drops to a small squirt ; has occasionally been a full emptying   Urinary  Urgency: Yes.  Able to delay the urge for at least 1 minute(s).  Pain with delaying the urge to urinate: No     Frequency of bowel movements: will go 7-8 days without going; typically every 3-4 days  Difficulty initiating BM: Yes  Quality/Shape of BM: Calhan Stool Chart type 1  Does Patient Feel Empty after BM? No  Bowel Urgency: No.  Able to delay the urge for at least 15 minute(s).  Fiber Supplements or Laxative Use? No   Pain with BM: Yes stabbing   History of hemorrhoids/anal fissures?  Yes- since pregnancy in 1997  Bleeding with BM: Yes with straining   Colon leakage: rare; once or twice in the last few years     Form of protection: panty liner   Number of pads required in 24 hours: 1/day    Types of fluid intake: Water: 6-8 bottles /day  Diet: Standard  Habitus: well developed, well nourished  Abuse/Neglect: Pt denies a history of physical or emotional abuse at this visit.       Pain:  Location: headaches that have moved to her feet, comes and goes . Going next Tuesday for her biopsy on the 23rd     Medical History: Kamilla  has a past medical history of Chest pain, musculoskeletal.     Surgical History: Kamilla Montes  has a past surgical history that includes Novasure; Esophagogastroduodenoscopy; Bladder suspension; Augmentation of breast (Bilateral); and Colonoscopy (N/A, 6/13/2022).    Medications: Kamilla has a current medication list which includes the following prescription(s): azelastine, bimatoprost, estradiol, finasteride, fluticasone propionate, minoxidil, myrbetriq, progesterone, tacrolimus, and triamcinolone acetonide 0.1%, and the following Facility-Administered Medications: triamcinolone acetonide and triamcinolone acetonide.    Allergies: Review of patient's allergies indicates:  No Known Allergies     Imaging: None reported for this condition     Prior Therapy/Previous treatment included: None reported for this condition   Social History/Living Arrangements: lives with their family  Current  exercise: before recent symptoms at ankle, was doing walking and water aerobics   Occupation: teaching - helps at [employer]   Prior Level of Function: Pt was independent with all ADLs and iADLs without pain, no reports of incontinence of bowel or bladder.  Current Level of Function: Urinary and bowel dysfunction impacting ADLs.    Constitutional Symptoms Review: The patient denies having any constitutional symptoms.     Flags Screening  Red Flags: currently being screened for abnormal biopsy      Pts goals: to improve urinary leakage and bowel function    OBJECTIVE     See EMR under MEDIA for written consent provided 7/16/2024  Chaperone: declined    ORTHO SCREEN  Posture in sitting: WNL  Posture in standing: increased weight shift onto right  Pelvic alignment: no sign of deviations noted in supine   SI Joint Palpation: Denies tenderness to SI joint palpation bilaterally.  Adductor Palpation: WNL  Ortho screen: decreased left ankle DF with use of ankle brace, lump on left leg with tenderness to palpation (discussed return to MD)    ABDOMINAL WALL ASSESSMENT  Palpation: increased tension     Abdominal strength: Rectus abdominus: Good     Transverse abdominus: Good activation, poor isolation. Patient tends to overcompensate with obliques  Scarring: none observed  Pelvic Floor Muscle and Transverse Abdominus Synergy: present  Diastasis: absent     BREATHING MECHANICS ASSESSMENT   Thorax Assessment During Quiet Respiration: Decreased excursion of abdominal wall   Thorax Assessment During Deep Respiration: paradoxical (abdominal wall contracts during inhalation)  Comments: significant difficulty with deep diaphragmatic respiration- trouble with abdominal wall expansion    VAGINAL PELVIC FLOOR EXAM  Deferred secondary to time constraints    Intake Outcome Measures for FOTO Urinary Problem Survey    Therapist reviewed FOTO scores for Kamilla Montes on 7/16/2024.     FOTO report- see Media section in Epic or account  episode details in FOTO.      Intake Score:   47         TREATMENT        Treatment Time In: 12:00 pm  Treatment Time Out: 12:15 pm  Total Treatment time (time-based codes) separate from Evaluation: 15 minutes    Therapeutic Activity Patient participated in dynamic functional therapeutic activities to improve functional performance for 15 minutes. Including: Education as described below.     [x] bladder irritants, anatomy/physiology of pelvic floor, posture/body mechanices, diaphragmatic breathing, proper bearing down techniques, fluid intake/dietary modifications, behavior modifications, and Coordination of kegels with functional activities such as cough, laugh, sneeze, lift, etc.    [x] Instruction on diaphragmatic breathing   [x] Education on visual cues/mental imagery for pelvic floor relaxation  [x] Education on visual cues/mental imagery for pelvic floor activation   [x] Instruction on the Knack for reduction of stress urinary incontinence  [x] Pelvic anatomy edu and impact on current level of function   [x] HEP building      Written Home Exercises and Education Provided: yes. Exercises were reviewed and Kamilla was able to demonstrate them prior to the end of the session.  Kamilla demonstrated good  understanding of the education provided. See EMR under Patient Instructions for exercises and education provided during therapy sessions.      ASSESSMENT     Kamilla is a 53 y.o. female referred to outpatient Physical Therapy with a medical diagnosis of Overactive bladder [N32.81], Mixed stress and urge urinary incontinence [N39.46] . Pt presents with altered posture, poor knowledge of body mechanics and posture, decreased pelvic muscle strength, decreased phasic ability of the pelvic muscles, poor quality of pelvic muscle contraction, poor coordination of pelvic floor muscles during ADL's leading to urinary or fecal leakage, dysfunctional voiding, dysfunctional defecation, and unable to co-contract or co-relax  abdominal wall and pelvic floor muscles. Upon assessment, patient demonstrates significant difficulty with abdominal expansion during attempted deep respiration. She also demonstrates excessive oblique compensation and abdominal tension likely contributing to pelvic floor dysfunction including urinary incontinence and dyssynergic defecation. It should be noted that patient exhibits lump on lower extremity that she reports she is following up with a medical doctor about as she has already been seen several times for abnormal symptoms.     Pt prognosis is Good.   Pt will benefit from skilled outpatient Physical Therapy to address the deficits stated above and in the chart below, provide pt/family education, and to maximize pt's level of independence.     Plan of care discussed with patient: Yes  Pt's spiritual, cultural and educational needs considered and patient is agreeable to the plan of care and goals as stated below:     Anticipated Barriers for therapy: scheduling    Medical Necessity is demonstrated by the following  History  Co-morbidities and personal factors that may impact the plan of care [x] LOW: no personal factors / co-morbidities  [] MODERATE: 1-2 personal factors / co-morbidities  [] HIGH: 3+ personal factors / co-morbidities    Moderate / High Support Documentation:   Co-morbidities affecting plan of care:     Personal Factors:   no deficits     Examination  Body Structures and Functions, activity limitations and participation restrictions that may impact the plan of care [x] LOW: addressing 1-2 elements  [] MODERATE: 3+ elements  [] HIGH: 4+ elements (please support below)    Moderate / High Support Documentation: altered gait with inconsistent and unexplained left lower extremity pain     Clinical Presentation [x] LOW: stable  [] MODERATE: Evolving  [] HIGH: Unstable     Decision Making/ Complexity Score: low         Goals:  Short Term Goals: 4 weeks   Patient to demonstrate proper use of urge  delay strategies to help reduce urge urinary incontinence with activities of daily living.   Pt to demonstrate being able to correctly and consistently perform a kegel which is needed  to increase pelvic floor muscle coordination and strength needed for continence.  Pt to demonstrate proper positioning on commode with breathing techniques to decrease strain with BM to enable pt to feel empty after BM.   Pt to demonstrate independence with performing bowel massage to help with gut motility.   Pt to be able to bulge pelvic floor which is needed for comfortable BM and complete evacuation.       Long Term Goals: 12 weeks   Pt to be discharged with home plan for carry over after discharge.    Pt to be able to perform a 10 second kegel x 10 reps to demonstrate improving strength and endurance needed for continence.  Pt to report an 80% reduction of urinary incontinence symptoms with ADL participation thereby demonstrating improved pelvic floor muscle control and strength.   Pt to demonstrate an improved score in the FOTO Urinary Problem survey  to at least 60 to demonstrate improving pelvic floor muscle function and coordination needed for improved bladder control with ADLs.    Pt to be able to delay the urge to urinate at least 10-15 minutes with a strong urge to urinate in order to make it to the bathroom without leaking.  Pt to increase pelvic floor strength to at least 3/5 to demonstrate improved strength needed for continence with ADLs.   Pt to report being able to have a BM without straining 75% of the time to demonstrate improving PF coordination.   Pt to report a decrease in pain with BM to no more than minimal to none at its worst needed for more comfortable BM.          PLAN     Plan of care Certification: 7/16/2024 to 10/8/2024.    Outpatient Physical Therapy 1-2 times weekly for 12 weeks to include the following interventions: therapeutic exercises, therapeutic activity, neuromuscular re-education, gait  training, manual therapy, modalities PRN, patient/family education, and self care/home management, and  dry needling.     Next visit: pelvic floor assessment, working on diaphragmatic breathing with bearing down     Trina Robbins, PT, DPT

## 2024-07-22 ENCOUNTER — OFFICE VISIT (OUTPATIENT)
Dept: PRIMARY CARE CLINIC | Facility: CLINIC | Age: 54
End: 2024-07-22
Payer: COMMERCIAL

## 2024-07-22 VITALS
HEART RATE: 77 BPM | OXYGEN SATURATION: 99 % | WEIGHT: 135.13 LBS | HEIGHT: 67 IN | SYSTOLIC BLOOD PRESSURE: 114 MMHG | BODY MASS INDEX: 21.21 KG/M2 | RESPIRATION RATE: 18 BRPM | DIASTOLIC BLOOD PRESSURE: 64 MMHG

## 2024-07-22 DIAGNOSIS — Z76.89 ENCOUNTER TO ESTABLISH CARE: Primary | ICD-10-CM

## 2024-07-22 DIAGNOSIS — S80.11XA CONTUSION OF RIGHT LOWER LEG, INITIAL ENCOUNTER: ICD-10-CM

## 2024-07-22 DIAGNOSIS — R29.818 OTHER SYMPTOMS AND SIGNS INVOLVING THE NERVOUS SYSTEM: ICD-10-CM

## 2024-07-22 DIAGNOSIS — E61.1 LOW IRON: ICD-10-CM

## 2024-07-22 DIAGNOSIS — M25.561 ACUTE PAIN OF BOTH KNEES: ICD-10-CM

## 2024-07-22 DIAGNOSIS — R20.0 NUMBNESS: ICD-10-CM

## 2024-07-22 DIAGNOSIS — Z51.81 MEDICATION MONITORING ENCOUNTER: ICD-10-CM

## 2024-07-22 DIAGNOSIS — R22.40 MASS OF SHIN: ICD-10-CM

## 2024-07-22 DIAGNOSIS — M25.562 ACUTE PAIN OF BOTH KNEES: ICD-10-CM

## 2024-07-22 DIAGNOSIS — Z13.6 ENCOUNTER FOR SCREENING FOR CARDIOVASCULAR DISORDERS: ICD-10-CM

## 2024-07-22 PROCEDURE — 99999 PR PBB SHADOW E&M-EST. PATIENT-LVL V: CPT | Mod: PBBFAC,,, | Performed by: STUDENT IN AN ORGANIZED HEALTH CARE EDUCATION/TRAINING PROGRAM

## 2024-07-22 PROCEDURE — 99204 OFFICE O/P NEW MOD 45 MIN: CPT | Mod: S$GLB,,, | Performed by: STUDENT IN AN ORGANIZED HEALTH CARE EDUCATION/TRAINING PROGRAM

## 2024-07-22 PROCEDURE — 3078F DIAST BP <80 MM HG: CPT | Mod: CPTII,S$GLB,, | Performed by: STUDENT IN AN ORGANIZED HEALTH CARE EDUCATION/TRAINING PROGRAM

## 2024-07-22 PROCEDURE — 3008F BODY MASS INDEX DOCD: CPT | Mod: CPTII,S$GLB,, | Performed by: STUDENT IN AN ORGANIZED HEALTH CARE EDUCATION/TRAINING PROGRAM

## 2024-07-22 PROCEDURE — 3074F SYST BP LT 130 MM HG: CPT | Mod: CPTII,S$GLB,, | Performed by: STUDENT IN AN ORGANIZED HEALTH CARE EDUCATION/TRAINING PROGRAM

## 2024-07-22 PROCEDURE — 1160F RVW MEDS BY RX/DR IN RCRD: CPT | Mod: CPTII,S$GLB,, | Performed by: STUDENT IN AN ORGANIZED HEALTH CARE EDUCATION/TRAINING PROGRAM

## 2024-07-22 PROCEDURE — 1159F MED LIST DOCD IN RCRD: CPT | Mod: CPTII,S$GLB,, | Performed by: STUDENT IN AN ORGANIZED HEALTH CARE EDUCATION/TRAINING PROGRAM

## 2024-07-22 NOTE — PROGRESS NOTES
Subjective:       Patient ID: Kamilla Montes is a 53 y.o. female.    Chief Complaint: Establish Care, Numbness, and lumps on left shin    HPI:  53 y.o. female presents to [facility] to establish care    Acute concerns?: Patient reports numbness to feet and lumps to right shin. Patient reports symptoms began     Patient reports numbness began to left side of head and moved to right side. Was initially valentino tender to touch, especially if brushing her hair. Drastically improved since onset, now experiencing to feet. Can occur on ankle and heels. Will improve within days, but can recur. Now has soreness and tingling into her left arm.    Right leg is having bumps that arose about 2 months ago. Reported to ENT and was told to establish with PCP. Has been persistent since onset, now having a second arise below it. Lesions are tender to touch. No discharge. No trauma recalled. Second lesion arouse about 2 weeks ago. Nothing to date to help alleviate.    Onset?: couple weeks  Progression?: Worsening  Good hydration?: Yes  Sleep?: Feels wakes frequently  Appetite?: Has been OK  Weight loss?: No  New Medications?: None  History of fatigue?: No    Last PCP?: Has been some time  Allergies: NKDA  Medical History: None   Medications: Estradiol 1 mg daily, finasteride 5 mg, minoxidil foam 5%, progesterone 100 mg  Surgical History: Breast augmentation, bladder suspension, Novosure  Family History: No known cancers; Paternal aunt with lupus, no MS known  Social History: Never smoker, EtOH if out, no illicits    Fasting?: Yes  Last blood work?: 7/2022  Last tetanus vaccine?: Uncertain    Review of Systems   Constitutional:  Positive for fatigue. Negative for chills, diaphoresis and fever.   HENT:  Negative for congestion, ear pain, sinus pressure, sneezing and sore throat.    Respiratory:  Positive for shortness of breath (With exertion). Negative for cough.    Cardiovascular:  Negative for chest pain and palpitations.  "  Gastrointestinal:  Negative for abdominal pain, blood in stool, diarrhea, nausea and vomiting.   Genitourinary:  Negative for hematuria.   Musculoskeletal:  Positive for arthralgias (Bilateral knees sicne numbness began). Negative for joint swelling and myalgias.   Skin:  Negative for rash and wound.        Bumps to skin of right shin   Neurological:  Positive for numbness. Negative for weakness.       Objective:      Vitals:    07/22/24 1326   BP: 114/64   BP Location: Right arm   Patient Position: Sitting   BP Method: Medium (Manual)   Pulse: 77   Resp: 18   SpO2: 99%   Weight: 61.3 kg (135 lb 2.3 oz)   Height: 5' 7" (1.702 m)     Physical Exam  Vitals reviewed.   Constitutional:       General: She is not in acute distress.     Appearance: Normal appearance. She is not ill-appearing.   HENT:      Head: Normocephalic and atraumatic.   Eyes:      General:         Right eye: No discharge.         Left eye: No discharge.      Conjunctiva/sclera: Conjunctivae normal.   Cardiovascular:      Rate and Rhythm: Normal rate and regular rhythm.      Pulses: Normal pulses.      Heart sounds: No murmur heard.  Pulmonary:      Effort: Pulmonary effort is normal.      Breath sounds: Normal breath sounds.   Musculoskeletal:         General: No deformity.      Cervical back: Neck supple. No rigidity.   Lymphadenopathy:      Cervical: No cervical adenopathy.   Skin:     General: Skin is warm and dry.      Coloration: Skin is not jaundiced.      Comments: Raised tender lesion to right lower leg anterior shin with tenderness to palpation. Mild erythema/edema. No warmth.   Neurological:      General: No focal deficit present.      Mental Status: She is alert and oriented to person, place, and time.   Psychiatric:         Mood and Affect: Mood normal.         Behavior: Behavior normal.             Lab Results   Component Value Date     04/28/2022    K 4.3 04/28/2022     04/28/2022    CO2 26 04/28/2022    BUN 17 04/28/2022 " "   CREATININE 0.7 04/28/2022    ANIONGAP 9 04/28/2022     Lab Results   Component Value Date    HGBA1C 5.4 04/28/2022     No results found for: "BNP", "BNPTRIAGEBLO"    Lab Results   Component Value Date    WBC 4.74 07/07/2022    HGB 12.2 07/07/2022    HCT 38.1 07/07/2022     07/07/2022    GRAN 2.5 07/07/2022    GRAN 53.2 07/07/2022     Lab Results   Component Value Date    CHOL 137 04/29/2018    HDL 59 04/29/2018    LDLCALC 69.8 04/29/2018    TRIG 41 04/29/2018          Current Outpatient Medications:     bimatoprost (LATISSE) 0.03 % ophthalmic solution, Place one drop on applicator and apply evenly along the skin of the upper eyelid at base of eyelashes once daily at bedtime; repeat procedure for second eye (use a clean applicator)., Disp: 5 mL, Rfl: 12    estradioL (ESTRACE) 1 MG tablet, Take 1 tablet (1 mg total) by mouth once daily., Disp: 90 tablet, Rfl: 3    finasteride (PROSCAR) 5 mg tablet, Take 1 tablet (5 mg total) by mouth once daily., Disp: 90 tablet, Rfl: 3    minoxidiL 5 % Foam, Apply half a capful to scalp QAM, Disp: 60 g, Rfl: 11    progesterone (PROMETRIUM) 100 MG capsule, Take 1 capsule (100 mg total) by mouth nightly., Disp: 90 capsule, Rfl: 3    tacrolimus (PROTOPIC) 0.1 % ointment, Apply thin film to anterior hairline/forehead once daily (Patient not taking: Reported on 7/22/2024), Disp: 100 g, Rfl: 5    triamcinolone acetonide 0.1% (KENALOG) 0.1 % cream, Apply to affected areas of body BID prn rash. Do not use on face, underarms, or groin. (Patient not taking: Reported on 7/22/2024), Disp: 80 g, Rfl: 3    Current Facility-Administered Medications:     triamcinolone acetonide injection 10 mg, 10 mg, Intradermal, 1 time in Clinic/HOD, Jocelyn Real MD    triamcinolone acetonide injection 10 mg, 10 mg, Intradermal, 1 time in Clinic/HOD, Jocelyn Real MD        Assessment:       1. Encounter to establish care    2. Other symptoms and signs involving the nervous system    3. " Numbness    4. Contusion of right lower leg, initial encounter    5. Encounter for screening for cardiovascular disorders    6. Medication monitoring encounter    7. Low iron    8. Acute pain of both knees           Plan:       Encounter to establish care  Other symptoms and signs involving the nervous system  Numbness  -     Ambulatory referral/consult to Neurology; Future; Expected date: 07/29/2024  -     ZINC; Future; Expected date: 07/22/2024  -     VITAMIN B12; Future; Expected date: 07/22/2024  -     VITAMIN B6; Future; Expected date: 07/22/2024  -     VITAMIN B1; Future; Expected date: 07/22/2024  -     TSH; Future; Expected date: 07/22/2024  -     T4, Free; Future; Expected date: 07/22/2024  -     Magnesium; Future; Expected date: 07/22/2024  -     LYME DISEASE ANTIBODY BY EIA; Future; Expected date: 07/22/2024  -     Treponema Pallidium Antibodies IgG, IgM; Future; Expected date: 07/22/2024  -     HIV 1/2 Ag/Ab (4th Gen); Future; Expected date: 07/22/2024  -     Sedimentation rate; Future; Expected date: 07/22/2024  -     C-reactive protein; Future; Expected date: 07/22/2024  - Will assess for organic causes  - Appreciate Neurology recommendations    Contusion of right lower leg, initial encounter  Low iron  -     Ferritin; Future; Expected date: 07/22/2024  - Possible relation to estrogen and progesterone as patient reports historically experienced this on OCPs in the past  - If persistent or more lesions arise, can refer for Hematologist evalaution    Encounter for screening for cardiovascular disorders  -     Lipid Panel; Future; Expected date: 07/22/2024    Medication monitoring encounter  -     Comprehensive Metabolic Panel; Future; Expected date: 07/22/2024  -     CBC Auto Differential; Future; Expected date: 07/22/2024    Acute pain of both knees  -     ISABEL Screen w/Reflex; Future; Expected date: 07/22/2024  -     Rheumatoid Factor; Future; Expected date: 07/22/2024  -     CYCLIC CITRUL PEPTIDE  ANTIBODY, IGG; Future; Expected date: 07/22/2024    RTC PRN

## 2024-07-23 ENCOUNTER — HOSPITAL ENCOUNTER (OUTPATIENT)
Dept: RADIOLOGY | Facility: HOSPITAL | Age: 54
Discharge: HOME | End: 2024-07-23
Attending: PHYSICIAN ASSISTANT
Payer: COMMERCIAL

## 2024-07-23 ENCOUNTER — PATIENT MESSAGE (OUTPATIENT)
Dept: PRIMARY CARE CLINIC | Facility: CLINIC | Age: 54
End: 2024-07-23
Payer: COMMERCIAL

## 2024-07-23 ENCOUNTER — HOSPITAL ENCOUNTER (OUTPATIENT)
Dept: RADIOLOGY | Facility: HOSPITAL | Age: 54
Discharge: HOME | End: 2024-07-23
Attending: STUDENT IN AN ORGANIZED HEALTH CARE EDUCATION/TRAINING PROGRAM
Payer: COMMERCIAL

## 2024-07-23 DIAGNOSIS — R59.0 CERVICAL LYMPHADENOPATHY: ICD-10-CM

## 2024-07-23 DIAGNOSIS — R29.818 OTHER SYMPTOMS AND SIGNS INVOLVING THE NERVOUS SYSTEM: ICD-10-CM

## 2024-07-23 DIAGNOSIS — J39.2 THROAT MASS: ICD-10-CM

## 2024-07-23 PROCEDURE — 27200940 US FINE NEEDLE ASPIRATION BIOPSY, FIRST LESION

## 2024-07-23 PROCEDURE — 10005 FNA BX W/US GDN 1ST LES: CPT

## 2024-07-23 PROCEDURE — 76536 US EXAM OF HEAD AND NECK: CPT | Mod: TC

## 2024-07-23 PROCEDURE — 10005 FNA BX W/US GDN 1ST LES: CPT | Mod: ,,, | Performed by: RADIOLOGY

## 2024-07-23 PROCEDURE — 76536 US EXAM OF HEAD AND NECK: CPT | Mod: 26,,, | Performed by: RADIOLOGY

## 2024-07-23 PROCEDURE — 70551 MRI BRAIN STEM W/O DYE: CPT | Mod: TC,PO

## 2024-07-23 PROCEDURE — 70551 MRI BRAIN STEM W/O DYE: CPT | Mod: 26,,, | Performed by: RADIOLOGY

## 2024-07-24 ENCOUNTER — TELEPHONE (OUTPATIENT)
Dept: PRIMARY CARE CLINIC | Facility: CLINIC | Age: 54
End: 2024-07-24
Payer: COMMERCIAL

## 2024-07-24 DIAGNOSIS — R22.40 MASS OF SHIN: Primary | ICD-10-CM

## 2024-07-24 NOTE — TELEPHONE ENCOUNTER
"----- Message from Trixie Rojas sent at 7/24/2024  9:08 AM CDT -----  Consult/Advisory:    Patient Status: EP     Scheduling Appt Type: pt requesting an US order for R-leg. Please call     Additional Notes:  "Thank you"  "

## 2024-07-29 ENCOUNTER — OFFICE VISIT (OUTPATIENT)
Dept: OTOLARYNGOLOGY | Facility: CLINIC | Age: 54
End: 2024-07-29
Payer: COMMERCIAL

## 2024-07-29 VITALS
HEART RATE: 59 BPM | BODY MASS INDEX: 21.35 KG/M2 | SYSTOLIC BLOOD PRESSURE: 108 MMHG | HEIGHT: 67 IN | DIASTOLIC BLOOD PRESSURE: 70 MMHG | WEIGHT: 136 LBS

## 2024-07-29 DIAGNOSIS — J39.2 THROAT MASS: ICD-10-CM

## 2024-07-29 DIAGNOSIS — R59.0 CERVICAL LYMPHADENOPATHY: ICD-10-CM

## 2024-07-29 PROCEDURE — 3078F DIAST BP <80 MM HG: CPT | Mod: CPTII,S$GLB,, | Performed by: OTOLARYNGOLOGY

## 2024-07-29 PROCEDURE — 99999 PR PBB SHADOW E&M-EST. PATIENT-LVL IV: CPT | Mod: PBBFAC,,, | Performed by: OTOLARYNGOLOGY

## 2024-07-29 PROCEDURE — 1159F MED LIST DOCD IN RCRD: CPT | Mod: CPTII,S$GLB,, | Performed by: OTOLARYNGOLOGY

## 2024-07-29 PROCEDURE — 3074F SYST BP LT 130 MM HG: CPT | Mod: CPTII,S$GLB,, | Performed by: OTOLARYNGOLOGY

## 2024-07-29 PROCEDURE — 99213 OFFICE O/P EST LOW 20 MIN: CPT | Mod: 25,S$GLB,, | Performed by: OTOLARYNGOLOGY

## 2024-07-29 PROCEDURE — 31575 DIAGNOSTIC LARYNGOSCOPY: CPT | Mod: S$GLB,,, | Performed by: OTOLARYNGOLOGY

## 2024-07-29 PROCEDURE — 1160F RVW MEDS BY RX/DR IN RCRD: CPT | Mod: CPTII,S$GLB,, | Performed by: OTOLARYNGOLOGY

## 2024-07-29 PROCEDURE — 3008F BODY MASS INDEX DOCD: CPT | Mod: CPTII,S$GLB,, | Performed by: OTOLARYNGOLOGY

## 2024-07-29 NOTE — PROGRESS NOTES
CC: Cervical adenopathy      HPI   53 y.o. female presents for evaluation of cervical adenopathy/sore throat.  She reports that she experienced a sore throat in June of this year.  It resolved with abx. CT neck revealed R RP adenopathy and R cervical LAD.  FNA of cervical node was benign.  No other complaints. No dysphagia or SOB.     Review of Systems   Constitutional: Negative for fatigue and unexpected weight change.   HENT: Per HPI.  Eyes: Negative for visual disturbance.   Respiratory: Negative for shortness of breath, hemoptysis   Cardiovascular: Negative for chest pain and palpitations.   Musculoskeletal: Negative for decreased ROM, back pain.   Skin: Negative for rash, sunburn, itching.   Neurological: Negative for dizziness and seizures.   Hematological: Negative for adenopathy. Does not bruise/bleed easily.   Endocrine: Negative for rapid weight loss/weight gain, heat/cold intolerance.     Past Medical History   Patient Active Problem List   Diagnosis    Pelvic floor dysfunction    Cervical lymphadenopathy           Past Surgical History   Past Surgical History:   Procedure Laterality Date    AUGMENTATION OF BREAST Bilateral     BLADDER SUSPENSION      BREAST SURGERY  2002    Breast Augmentation    COLONOSCOPY N/A 06/13/2022    Procedure: COLONOSCOPY;  Surgeon: Santiago Espinoza MD;  Location: [facility];  Service: Endoscopy;  Laterality: N/A;    ESOPHAGOGASTRODUODENOSCOPY      Novasure           Family History   Family History   Problem Relation Name Age of Onset    Diabetes Mother Barbra Gordillo            Social History   .  Social History     Socioeconomic History    Marital status:    Tobacco Use    Smoking status: Never    Smokeless tobacco: Never    Tobacco comments:     Never   Substance and Sexual Activity    Alcohol use: Yes     Alcohol/week: 1.0 standard drink of alcohol     Types: 1 Glasses of wine per week     Comment: socially     Drug use: Never    Sexual activity: Yes     Partners:  Male     Birth control/protection: Partner-Vasectomy, Post-menopausal     Comment: Ablation Surgery     Social Determinants of Health     Financial Resource Strain: Low Risk  (4/3/2024)    Overall Financial Resource Strain (CARDIA)     Difficulty of Paying Living Expenses: Not hard at all   Food Insecurity: No Food Insecurity (4/3/2024)    Hunger Vital Sign     Worried About Running Out of Food in the Last Year: Never true     Ran Out of Food in the Last Year: Never true   Transportation Needs: No Transportation Needs (4/3/2024)    PRAPARE - Transportation     Lack of Transportation (Medical): No     Lack of Transportation (Non-Medical): No   Physical Activity: Insufficiently Active (4/3/2024)    Exercise Vital Sign     Days of Exercise per Week: 1 day     Minutes of Exercise per Session: 30 min   Stress: Stress Concern Present (4/3/2024)    [place] [employer] - Occupational Stress Questionnaire     Feeling of Stress : To some extent   Housing Stability: Low Risk  (4/3/2024)    Housing Stability Vital Sign     Unable to Pay for Housing in the Last Year: No     Number of Places Lived in the Last Year: 1     Unstable Housing in the Last Year: No         Allergies   Review of patient's allergies indicates:  No Known Allergies        Physical Exam     Vitals:    07/29/24 0835   BP: 108/70   Pulse: (!) 59         Body mass index is 21.3 kg/m².      General: AOx3, NAD   Respiratory:  Symmetric chest rise, normal effort  Nose: No gross nasal septal deviation. Inferior Turbinates WNL bilaterally. No septal perforation. No masses/lesions.   Oral Cavity:  Oral Tongue mobile, no lesions noted. Hard Palate WNL. No buccal or FOM lesions.  Oropharynx:  No masses/lesions of the posterior pharyngeal wall. Tonsillar fossa without lesions. Soft palate without masses. Midline uvula.   Neck: No scars.  Roughly 1.5 cm R level II node.  TTP. No , thyromegaly or thyroid nodules.  Normal range of motion.    Face:  House Brackmann I bilaterally.     Flex Naso [place] Hypo Procedures #2    Procedure:  Diagnostic flexible nasopharyngoscopy, laryngoscopy and hypopharyngoscopy:    Routine preparation with local atomizer with 1% neosynephrine/pontocaine with customary flexible endoscope.    Nasopharynx:  No lesions.   Mucosa:  No lesions.   Adenoids:  Present.  Posterior Choanae:  Patent.  Eustachian Tubes:  Patent.  Posterior pharynx:  No lesions.  Larynx/hypopharynx:   Epiglottis:  No lesions, without edema.   AE Folds:  No lesions.   Vocal cords:  No polyps, nodules, ulcers or lesions.   Mobility:  Equal and normal bilateral.   Hypopharynx:  No lesions.   Piriform sinus:  No pooling, no lesions.   Post Cricoid:  No erythema, no edema.    CT neck, US reviewed.    Assessment/Plan  Problem List Items Addressed This Visit          Other    Cervical lymphadenopathy     Likely benign R cervical nodes.  No worrisome lesions.  Repeat US in 1 month.          Relevant Orders    US Soft Tissue Head Neck     Other Visit Diagnoses       Throat mass

## 2024-08-02 ENCOUNTER — PATIENT MESSAGE (OUTPATIENT)
Dept: PRIMARY CARE CLINIC | Facility: CLINIC | Age: 54
End: 2024-08-02
Payer: COMMERCIAL

## 2024-08-02 DIAGNOSIS — R00.0 TACHYCARDIA: Primary | ICD-10-CM

## 2024-08-06 ENCOUNTER — CLINICAL SUPPORT (OUTPATIENT)
Dept: REHABILITATION | Facility: HOSPITAL | Age: 54
End: 2024-08-06
Attending: NURSE PRACTITIONER
Payer: COMMERCIAL

## 2024-08-06 DIAGNOSIS — M62.89 PELVIC FLOOR DYSFUNCTION: Primary | ICD-10-CM

## 2024-08-06 PROCEDURE — 97112 NEUROMUSCULAR REEDUCATION: CPT | Mod: PO

## 2024-08-07 ENCOUNTER — OFFICE VISIT (OUTPATIENT)
Dept: OBSTETRICS AND GYNECOLOGY | Facility: CLINIC | Age: 54
End: 2024-08-07
Payer: COMMERCIAL

## 2024-08-07 VITALS
DIASTOLIC BLOOD PRESSURE: 50 MMHG | HEIGHT: 67 IN | WEIGHT: 134.06 LBS | BODY MASS INDEX: 21.04 KG/M2 | SYSTOLIC BLOOD PRESSURE: 92 MMHG

## 2024-08-07 DIAGNOSIS — Z78.0 MENOPAUSE: ICD-10-CM

## 2024-08-07 DIAGNOSIS — Z12.4 SCREENING FOR MALIGNANT NEOPLASM OF CERVIX: ICD-10-CM

## 2024-08-07 DIAGNOSIS — Z01.419 WELL WOMAN EXAM WITH ROUTINE GYNECOLOGICAL EXAM: Primary | ICD-10-CM

## 2024-08-07 PROCEDURE — 3008F BODY MASS INDEX DOCD: CPT | Mod: CPTII,S$GLB,, | Performed by: OBSTETRICS & GYNECOLOGY

## 2024-08-07 PROCEDURE — 99999 PR PBB SHADOW E&M-EST. PATIENT-LVL III: CPT | Mod: PBBFAC,,, | Performed by: OBSTETRICS & GYNECOLOGY

## 2024-08-07 PROCEDURE — 3078F DIAST BP <80 MM HG: CPT | Mod: CPTII,S$GLB,, | Performed by: OBSTETRICS & GYNECOLOGY

## 2024-08-07 PROCEDURE — 87624 HPV HI-RISK TYP POOLED RSLT: CPT | Performed by: OBSTETRICS & GYNECOLOGY

## 2024-08-07 PROCEDURE — 99396 PREV VISIT EST AGE 40-64: CPT | Mod: S$GLB,,, | Performed by: OBSTETRICS & GYNECOLOGY

## 2024-08-07 PROCEDURE — 1159F MED LIST DOCD IN RCRD: CPT | Mod: CPTII,S$GLB,, | Performed by: OBSTETRICS & GYNECOLOGY

## 2024-08-07 PROCEDURE — 3074F SYST BP LT 130 MM HG: CPT | Mod: CPTII,S$GLB,, | Performed by: OBSTETRICS & GYNECOLOGY

## 2024-08-07 PROCEDURE — 88175 CYTOPATH C/V AUTO FLUID REDO: CPT | Performed by: OBSTETRICS & GYNECOLOGY

## 2024-08-07 RX ORDER — PROGESTERONE 100 MG/1
100 CAPSULE ORAL NIGHTLY
Qty: 90 CAPSULE | Refills: 3 | Status: SHIPPED | OUTPATIENT
Start: 2024-08-07 | End: 2025-08-07

## 2024-08-07 RX ORDER — ESTRADIOL 1 MG/1
1 TABLET ORAL DAILY
Qty: 90 TABLET | Refills: 3 | Status: SHIPPED | OUTPATIENT
Start: 2024-08-07 | End: 2025-08-07

## 2024-08-09 LAB
FINAL PATHOLOGIC DIAGNOSIS: NORMAL
Lab: NORMAL

## 2024-08-15 ENCOUNTER — CLINICAL SUPPORT (OUTPATIENT)
Dept: REHABILITATION | Facility: HOSPITAL | Age: 54
End: 2024-08-15
Payer: COMMERCIAL

## 2024-08-15 DIAGNOSIS — M62.89 PELVIC FLOOR DYSFUNCTION: Primary | ICD-10-CM

## 2024-08-15 PROCEDURE — 97112 NEUROMUSCULAR REEDUCATION: CPT | Mod: PO

## 2024-08-15 NOTE — PROGRESS NOTES
Pelvic Health Physical Therapy   Treatment Note     Name: Kamilla Montes  [facility] Number: [id number]    Therapy Diagnosis:   Encounter Diagnosis   Name Primary?    Pelvic floor dysfunction Yes     Physician: Dori Cross NP    Visit Date: 8/15/2024    Physician Orders: PT Eval and Treat   Medical Diagnosis from Referral: Overactive bladder [N32.81], Mixed stress and urge urinary incontinence [N39.46]   Evaluation Date: 7/16/2024  Authorization Period Expiration: 12-31-24  Plan of Care Expiration: 10/8/2024  Progress Note Due: 9/15/2024  Visit # / Visits authorized: 2/20 + eval  FOTO: 1/3 (provide on 8/20       Precautions: Standard & abnormal cells in cervical region with biopsy-currently being tested     Time In: 1:00 pm  Time Out: 1:48 pm  Total Billable Time: 48 minutes      Subjective     Pt reports: all labs were good and she is hanging in there. Her ankles are doing better, but on and off. She is going to the neurologist tomorrow. She had one good bowel movement where she felt like she emptied completely and it was a normal size. She is taking fiber, still drinking her water, and doing the breathing with the squatty potty. Still having the urgency and crossing her legs, trying to make it. She has had some success with urge delay.       She was compliant with home exercise program.  Response to previous treatment: tolerated well   Functional change: ongoing    Pain: 0/10  Location:  not applicable      Objective   Informed verbal consent provided 8/15/2024 prior to intravaginal treatment.  Chaperone: declined      VAGINAL PELVIC FLOOR EXAM    EXTERNAL ASSESSMENT  Introitus: WNL  Skin condition: WNL  Scarring: none   Voluntary contraction: accessory muscle use - glutes and/or adductors (more adductors with verbal cues to decrease glute activation)   Voluntary relaxation: visible drop  Involuntary contraction: visible drop  Bearing down: slight lengthening   Perineal descent: absent      INTERNAL  ASSESSMENT  Pain: tender areas noted as follows:none today  Sensation: able to localized pressure appropriately   Vaginal vault: asymmetrical   Muscle Bulk: hypertonus, more on left   Muscle Power: 1/5     Quality of contraction: partial closure, no lift (able to produce mild lift at layer 3 with DKTC position)    Specificity: patient contracts: glutes/adductors   Coordination: tends to hold breath during PFM contration     Kamilla participated in neuromuscular re-education activities for 48 minutes including:     Vaginal exam - including discussion of findings   SEMG biofeedback for improved coordination and awareness of pelvic activation vs lengthening   Child's pose + diaphragmatic breathing   Child's pose + Kegel   Sitting on towel roll + diaphragmatic breathing with bearing down   Sitting on towel roll +Kegel and relaxation x 5 minutes  Home exercise program review and update       Home Exercises Provided and Patient Education Provided     Education provided:   - anatomy/physiology of pelvic floor, posture/body mechanices, and diaphragmatic breathing  Discussed progression of plan of care with patient; educated pt in activity modification; reviewed HEP with pt. Pt demonstrated and verbalized understanding of all instruction and was provided with a handout of HEP (see Patient Instructions).      Written Home Exercises Provided: yes.  Exercises were reviewed and Kamilla was able to demonstrate them prior to the end of the session.  Kamilla demonstrated good  understanding of the education provided.     See EMR under Patient Instructions for exercises provided 8/6/2024.    Assessment     Kamilla presents today with improving pelvic tenderness. However, poor coordination and awareness present with decreased pelvic contraction noted today. Significant coordination deficits with excessive glute/adductor activation due to poor pelvic isolation, leading to increased urinary incontinence. Therefore, biofeedback performed for  visualization of pelvic floor coordination and control. Greatest coordination noted in sitting with use of towel roll. Encouraged patient to perform self assessment this week when she is not sure if she is correctly lengthening/activating for increased sensory feedback and understanding. Patient will be seeing neurologist tomorrow for initial visit to discover what may be occurring as she has been experiencing abnormal symptoms.Pt will continue to benefit from skilled outpatient physical therapy to address the deficits listed in the problem list box on initial evaluation, provide pt/family education and to maximize pt's level of independence in the home and community environment.     Kamilla Is progressing well towards her goals.   Pt prognosis is Good.     Pt will continue to benefit from skilled outpatient physical therapy to address the deficits listed in the problem list box on initial evaluation, provide pt/family education and to maximize pt's level of independence in the home and community environment.     Pt's spiritual, cultural and educational needs considered and pt agreeable to plan of care and goals.     Anticipated barriers to physical therapy: none     Goals: (PROGRESSING 8/15/2024 )  Short Term Goals: 4 weeks   Patient to demonstrate proper use of urge delay strategies to help reduce urge urinary incontinence with activities of daily living. (PROGRESSING 8/15/2024 )  Pt to demonstrate being able to correctly and consistently perform a kegel which is needed  to increase pelvic floor muscle coordination and strength needed for continence.(PROGRESSING 8/15/2024 )  Pt to demonstrate proper positioning on commode with breathing techniques to decrease strain with BM to enable pt to feel empty after BM. (PROGRESSING 8/15/2024 )  Pt to demonstrate independence with performing bowel massage to help with gut motility. (PROGRESSING 8/15/2024 )  Pt to be able to bulge pelvic floor which is needed for comfortable  BM and complete evacuation. (PROGRESSING 8/15/2024 )        Long Term Goals: 12 weeks   Pt to be discharged with home plan for carry over after discharge.  (PROGRESSING 8/15/2024 )  Pt to be able to perform a 10 second kegel x 10 reps to demonstrate improving strength and endurance needed for continence.(PROGRESSING 8/15/2024 )  Pt to report an 80% reduction of urinary incontinence symptoms with ADL participation thereby demonstrating improved pelvic floor muscle control and strength. (PROGRESSING 8/15/2024 )  Pt to demonstrate an improved score in the FOTO Urinary Problem survey  to at least 60 to demonstrate improving pelvic floor muscle function and coordination needed for improved bladder control with ADLs.  (PROGRESSING 8/15/2024 )  Pt to be able to delay the urge to urinate at least 10-15 minutes with a strong urge to urinate in order to make it to the bathroom without leaking.(PROGRESSING 8/15/2024 )  Pt to increase pelvic floor strength to at least 3/5 to demonstrate improved strength needed for continence with ADLs. (PROGRESSING 8/15/2024 )  Pt to report being able to have a BM without straining 75% of the time to demonstrate improving PF coordination. (PROGRESSING 8/15/2024 )  Pt to report a decrease in pain with BM to no more than minimal to none at its worst needed for more comfortable BM.    (PROGRESSING 8/15/2024 )        PLAN      Plan of care Certification: 7/16/2024 to 10/8/2024.     Outpatient Physical Therapy 1-2 times weekly for 12 weeks     Cont per plan of care . Coordination/BFB training. Needs MARINA Robbins, PT

## 2024-08-15 NOTE — PATIENT INSTRUCTIONS
"Sitting with towel roll under the vulva, practice the diaphragmatic breathing with bearing down (big belly, blow out like you are blowing out birthday candles) to practice pelvic drop needed for good bowel movement     2 x 10    After performing this relaxation technique, you can also use this same towel roll set up to practice strengthening the pelvic floor. While sitting on towel roll, perform Kegel squeeze 10 x 3-5" holds      These are both easy to incorporate into your day such as during a virtual meeting, sitting and watching TV, on a car ride, etc.     "

## 2024-08-16 ENCOUNTER — OFFICE VISIT (OUTPATIENT)
Dept: NEUROLOGY | Facility: CLINIC | Age: 54
End: 2024-08-16
Payer: COMMERCIAL

## 2024-08-16 VITALS
DIASTOLIC BLOOD PRESSURE: 73 MMHG | BODY MASS INDEX: 21.58 KG/M2 | HEART RATE: 63 BPM | WEIGHT: 137.81 LBS | SYSTOLIC BLOOD PRESSURE: 111 MMHG

## 2024-08-16 DIAGNOSIS — R20.0 NUMBNESS: Primary | ICD-10-CM

## 2024-08-16 PROCEDURE — 99999 PR PBB SHADOW E&M-EST. PATIENT-LVL IV: CPT | Mod: PBBFAC,,, | Performed by: STUDENT IN AN ORGANIZED HEALTH CARE EDUCATION/TRAINING PROGRAM

## 2024-08-16 NOTE — PROGRESS NOTES
[facility]  Clinic Note    Date of Service: 8/16/2024  Patient seen at the request of: Ellis Nguyen MD    Reason for Consultation  Migrating soreness/numbness    Assessment:  Kamilla Montes is a 53 y.o. female who presents with migrating soreness.    Patient reports a recent history of migrating soreness that started on the left side of her head prior to involving the right side of the head.  It then affected the heels (left>right) and now primarily the left knee and left heel associated with tingling to both areas.  Neurologic exam is without focal deficit.      Just prior to symptom onset, patient saw ENT for a right sided neck mass that was fine needle biopsied and negative for malignancy.  More recently, patient reports right anterior shin masses which are painful to palpation.      Will assess brain and spine for any autoimmune lesions.  If negative, etiology seems likely rheumatological.  ESR/CRP was 68/19.3.  Other initial rheumatologic workup was negative (these labs ordered by GP in [place])      Plan:    - MRI brain, C-spine, T-spine wo contrast   - referral to rheumatology  - will try anti-inflammatory as needed (ibuprofen naproxen)    - RTC in 3 months      Signed:    Yves Schmitz MD  Neurology/Epilepsy  08/16/2024 9:57 AM      HPI:  Kamilla Montes is a 53 y.o. female with   Past Medical History:   Diagnosis Date    Chest pain, musculoskeletal      In early June patient got numbness on the left side of the head that then migrated to the right side of the head associated pain to palpation.  After resolution, patient noted numbness in her left heel and then right heel.  Describes the pain as a soreness (left always worse than right).  Most recently the soreness is felt in the left knee and the left upper arm.  Currently feeling the soreness in the left knee and left foot.  Currently has tingling in the left heel and superior anterior shin.      Some weakness noted.  Not dropping  anything.  No changes in vision.  No noted triggers.      MRI was obtained while the patient was experiencing the head pain.      Had a swollen right lymph node in the neck prior to symptom onset.  Biopsies have been reportedly unrevealing.     Patient reports a anterior shin mass that was painful to palpation.  It resolved and another is now present.    No family history of neurologic disease.  One aunt with lupus.    No recent travel.  No pets.      This is the extent of the patient's complaints at this time.      Review of Systems:  ROS negative unless noted in HPI    Past Surgical History:  Past Surgical History:   Procedure Laterality Date    AUGMENTATION OF BREAST Bilateral 2002    BLADDER SUSPENSION      COLONOSCOPY N/A 06/13/2022    Procedure: COLONOSCOPY;  Surgeon: Santiago Espinoza MD;  Location: [facility];  Service: Endoscopy;  Laterality: N/A;    ESOPHAGOGASTRODUODENOSCOPY      Novasure         Family History:  Family History   Problem Relation Name Age of Onset    Diabetes Mother Barbra Gordillo        Social History:  Social History     Tobacco Use    Smoking status: Never    Smokeless tobacco: Never    Tobacco comments:     Never   Substance Use Topics    Alcohol use: Yes     Alcohol/week: 1.0 standard drink of alcohol     Types: 1 Glasses of wine per week     Comment: socially     Drug use: Never       Allergies:  Patient has no known allergies.    Outpatient Medications:  Prior to Admission medications    Medication Sig Start Date End Date Taking? Authorizing Provider   bimatoprost (LATISSE) 0.03 % ophthalmic solution Place one drop on applicator and apply evenly along the skin of the upper eyelid at base of eyelashes once daily at bedtime; repeat procedure for second eye (use a clean applicator). 10/27/23   Jocelyn Real MD   estradioL (ESTRACE) 1 MG tablet Take 1 tablet (1 mg total) by mouth once daily. 8/7/24 8/7/25  Cody Hale MD   finasteride (PROSCAR) 5 mg tablet Take 1 tablet (5  mg total) by mouth once daily. 6/29/23 7/22/24  Jocelyn Real MD   minoxidiL 5 % Foam Apply half a capful to scalp QAM 7/7/22   Jocelyn Real MD   progesterone (PROMETRIUM) 100 MG capsule Take 1 capsule (100 mg total) by mouth nightly. 8/7/24 8/7/25  Cody Hale MD       Physical exam:    Vitals: /73 (BP Location: Right arm, Patient Position: Sitting, BP Method: Small (Automatic))   Pulse 63   Wt 62.5 kg (137 lb 12.6 oz)   LMP  (LMP Unknown)   BMI 21.58 kg/m²     General:   Sitting in chair, in no distress, well-nourished, well-developed, appears stated age.  Head/Neck:   Normocephalic,atraumatic  Pulm:  Non-labored breathing     Mental Status: Alert and oriented to person, time, place, situation. Speech spontaneous and fluent without paraphasias; no dysarthria  CN:  II: visual fields full  III, IV, VI: EOM intact without nystagmus or diplopia.   V: Sensation to light touch full and symmetric in V1-3. [place] contraction full bilaterally.   VII: Facial movement full and symmetric.   VIII: Hearing grossly normal to conversation.  IX, X: Palate midline with symmetric elevation.    XI: SCM and trapezius: 5/5 bilaterally.   XII: Tongue midline without fasciculations.  Motor: Normal bulk and tone throughout all four extremities.   RUE: D: 5/5; B: 5/5; T:  5/5; WF:5/5; WE:  5/5; IO: 5/5   LUE: D: 5/5; B: 5/5; T:  5/5; WF: 5/5; WE:  5/5; IO: 5/5   RLE: HF: 5/5, KE: 5/5, KF: 5/5, DF: 5/5, PF: 5/5  LLE: HF: 5/5, KE: 5/5, KF: 5/5, DF: 5/5, PF: 5/5  No tremors   Sensory: Intact and symmetric to light touch throughout.  Reflexes: RUE: Triceps 2+, biceps 2+, brachioradialis 2+  LUE: Triceps 2+, biceps 2+ brachioradialis 2+  RLE: Knee 2+, ankle 2+  LLE: Knee 2+, ankle 2+  Coordination:  Intact and symmetric to finger-to-nose and heel-to-shin.  Gait:  Intact to casual gait.  Romberg negative    Imaging:  All pertinent imaging was personally reviewed.    Results for orders placed during the hospital  encounter of 07/23/24    MRI Brain Without Contrast    Narrative  EXAMINATION:  MRI BRAIN WITHOUT CONTRAST    CLINICAL HISTORY:  Neuro deficit, acute, stroke suspected;.  Other symptoms and signs involving the nervous system    TECHNIQUE:  Multiplanar multisequence MR imaging of the brain was performed without contrast.    COMPARISON:  None    FINDINGS:  Intracranial compartment:    Ventricles and sulci are normal in size for age without evidence of hydrocephalus. No extra-axial blood or fluid collections.    The brain parenchyma appears normal. No mass lesion, acute hemorrhage, edema or acute infarct.  There is no abnormality on the diffusion-weighted images to suggest acute infarct.    Normal vascular flow voids are preserved.  The corpus callosum, cerebellar tonsils, midline structures and orbits are normal.    Skull/extracranial contents (limited evaluation): Bone marrow signal intensity is normal.  The paranasal sinuses and mastoid air cells are clear.    Impression  No acute abnormality      Electronically signed by: Karol Mena  Date:    07/23/2024  Time:    12:06      I spent a total of 50 minutes on the day of the visit. This includes face to face time and non-face to face time preparing to see the patient (eg, review of tests), obtaining and/or reviewing separately obtained history, documenting clinical information in the electronic or other health record, independently interpreting results and communicating results to the patient/family/caregiver, or care coordinator.

## 2024-08-22 ENCOUNTER — CLINICAL SUPPORT (OUTPATIENT)
Dept: REHABILITATION | Facility: HOSPITAL | Age: 54
End: 2024-08-22
Attending: NURSE PRACTITIONER
Payer: COMMERCIAL

## 2024-08-22 DIAGNOSIS — M62.89 PELVIC FLOOR DYSFUNCTION: Primary | ICD-10-CM

## 2024-08-22 PROCEDURE — 97112 NEUROMUSCULAR REEDUCATION: CPT | Mod: PO

## 2024-08-22 PROCEDURE — 97530 THERAPEUTIC ACTIVITIES: CPT | Mod: PO

## 2024-08-22 NOTE — PROGRESS NOTES
Pelvic Health Physical Therapy   Treatment Note     Name: Kamilla Montes  [facility] Number: [id number]    Therapy Diagnosis:   Encounter Diagnosis   Name Primary?    Pelvic floor dysfunction Yes       Physician: Dori Cross NP    Visit Date: 8/22/2024    Physician Orders: PT Eval and Treat   Medical Diagnosis from Referral: Overactive bladder [N32.81], Mixed stress and urge urinary incontinence [N39.46]   Evaluation Date: 7/16/2024  Authorization Period Expiration: 12-31-24  Plan of Care Expiration: 10/8/2024  Progress Note Due: 9/15/2024  Visit # / Visits authorized: 3/20 + eval       Precautions: Standard    Time In: 9:20 am  Time Out: 10:03  am  Total Billable Time: 43 minutes      Subjective     Pt reports: this week was crazy with the bathroom as she was struggling to make it. She has been going to the bathroom at least every two to three hours. She notices it is so hard to Kegel without clenching her glutes.       She was compliant with home exercise program.  Response to previous treatment: tolerated well   Functional change: ongoing    Pain: 0/10  Location:  not applicable      Objective   Informed verbal consent provided 8/22/2024 prior to intravaginal treatment.  Chaperone: declined      VAGINAL PELVIC FLOOR EXAM    EXTERNAL ASSESSMENT  Introitus: WNL  Skin condition: WNL  Scarring: none   Voluntary contraction: accessory muscle use - glutes and/or adductors (more adductors with verbal cues to decrease glute activation)   Voluntary relaxation: visible drop  Involuntary contraction: visible drop  Bearing down: slight lengthening   Perineal descent: absent      INTERNAL ASSESSMENT  Pain: tender areas noted as follows:none today  Sensation: able to localized pressure appropriately   Vaginal vault: asymmetrical   Muscle Bulk: hypertonus, more on left - improving   Muscle Power: 1/5     Quality of contraction: partial closure, no lift (able to produce mild lift at layer 3 with DKTC position)    Specificity:  "patient contracts: glutes/adductors   Coordination: tends to hold breath during PFM contration     Kamilla participated in neuromuscular re-education activities for 33 minutes including:     Vaginal exam - including discussion of findings   Biofeedback using mirror to visualize contract relax in hook lying and happy baby   Repetitions performed in each of these positions - emphasis on more relaxation between repetitions    Not today:  SEMG biofeedback for improved coordination and awareness of pelvic activation vs lengthening   Child's pose + diaphragmatic breathing   Child's pose + Kegel   Sitting on towel roll + diaphragmatic breathing with bearing down   Sitting on towel roll +Kegel and relaxation x 5 minutes    Therapeutic Activity Patient participated in dynamic functional therapeutic activities to improve functional performance for 10 minutes. Including: Education as described below.     Education on toilet habits and avoiding squatting. Needs more relaxation between repetitions.   Home exercise program review and update   Gave 5 x 5 Kegels per day with 3" hold- working up to 5 sets of 10 Kegels as her strength improves- she is unable to feel the squeeze and fatigues very quickly as her isolation improves      Home Exercises Provided and Patient Education Provided     Education provided:   - anatomy/physiology of pelvic floor, posture/body mechanices, and diaphragmatic breathing  Discussed progression of plan of care with patient; educated pt in activity modification; reviewed HEP with pt. Pt demonstrated and verbalized understanding of all instruction and was provided with a handout of HEP (see Patient Instructions).      Written Home Exercises Provided: yes.  Exercises were reviewed and Kamilla was able to demonstrate them prior to the end of the session.  Kamilla demonstrated good  understanding of the education provided.     See EMR under Patient Instructions for exercises provided 8/6/2024.    Assessment "     Kamilla presents today with improving pelvic tenderness and muscle tone as hypertonus is palpably lessening. However, poor coordination and awareness present with decreased pelvic contraction noted today. Improved contract/relax following biofeedback training, utilizing mirror for increased activation of visual cortex. Continued difficulty avoiding glute/adductor activation due to poor pelvic isolation, but this improves. Patient demonstrates notable muscle fatigue and increased compensatory activation if she does not take extended time to relax between repetitions. Highly encouraged patient to perform less more repetitions spread out throughout the day for decreased compensations. Patient verbalizes understanding. Pt will continue to benefit from skilled outpatient physical therapy to address the deficits listed in the problem list box on initial evaluation, provide pt/family education and to maximize pt's level of independence in the home and community environment.     Kamilla Is progressing well towards her goals.   Pt prognosis is Good.     Pt will continue to benefit from skilled outpatient physical therapy to address the deficits listed in the problem list box on initial evaluation, provide pt/family education and to maximize pt's level of independence in the home and community environment.     Pt's spiritual, cultural and educational needs considered and pt agreeable to plan of care and goals.     Anticipated barriers to physical therapy: none     Goals: (PROGRESSING 8/22/2024 )  Short Term Goals: 4 weeks   Patient to demonstrate proper use of urge delay strategies to help reduce urge urinary incontinence with activities of daily living. (PROGRESSING 8/15/2024 )  Pt to demonstrate being able to correctly and consistently perform a kegel which is needed  to increase pelvic floor muscle coordination and strength needed for continence.(PROGRESSING 8/15/2024 )  Pt to demonstrate proper positioning on commode with  breathing techniques to decrease strain with BM to enable pt to feel empty after BM. (PROGRESSING 8/15/2024 )  Pt to demonstrate independence with performing bowel massage to help with gut motility. (PROGRESSING 8/15/2024 )  Pt to be able to bulge pelvic floor which is needed for comfortable BM and complete evacuation. (PROGRESSING 8/15/2024 )        Long Term Goals: 12 weeks   Pt to be discharged with home plan for carry over after discharge.  (PROGRESSING 8/15/2024 )  Pt to be able to perform a 10 second kegel x 10 reps to demonstrate improving strength and endurance needed for continence.(PROGRESSING 8/15/2024 )  Pt to report an 80% reduction of urinary incontinence symptoms with ADL participation thereby demonstrating improved pelvic floor muscle control and strength. (PROGRESSING 8/15/2024 )  Pt to demonstrate an improved score in the FOTO Urinary Problem survey  to at least 60 to demonstrate improving pelvic floor muscle function and coordination needed for improved bladder control with ADLs.  (PROGRESSING 8/15/2024 )  Pt to be able to delay the urge to urinate at least 10-15 minutes with a strong urge to urinate in order to make it to the bathroom without leaking.(PROGRESSING 8/15/2024 )  Pt to increase pelvic floor strength to at least 3/5 to demonstrate improved strength needed for continence with ADLs. (PROGRESSING 8/15/2024 )  Pt to report being able to have a BM without straining 75% of the time to demonstrate improving PF coordination. (PROGRESSING 8/15/2024 )  Pt to report a decrease in pain with BM to no more than minimal to none at its worst needed for more comfortable BM.    (PROGRESSING 8/15/2024 )        PLAN      Plan of care Certification: 7/16/2024 to 10/8/2024.     Outpatient Physical Therapy 1-2 times weekly for 12 weeks     Cont per plan of care . Coordination/BFB training.    Trina Robbins, PT

## 2024-08-26 ENCOUNTER — HOSPITAL ENCOUNTER (OUTPATIENT)
Dept: RADIOLOGY | Facility: HOSPITAL | Age: 54
Discharge: HOME | End: 2024-08-26
Attending: STUDENT IN AN ORGANIZED HEALTH CARE EDUCATION/TRAINING PROGRAM
Payer: COMMERCIAL

## 2024-08-26 DIAGNOSIS — R20.0 NUMBNESS: ICD-10-CM

## 2024-08-26 PROCEDURE — 70551 MRI BRAIN STEM W/O DYE: CPT | Mod: TC,PO

## 2024-08-26 PROCEDURE — 72141 MRI NECK SPINE W/O DYE: CPT | Mod: 26,,, | Performed by: RADIOLOGY

## 2024-08-26 PROCEDURE — 72141 MRI NECK SPINE W/O DYE: CPT | Mod: TC,PO

## 2024-08-26 PROCEDURE — 70551 MRI BRAIN STEM W/O DYE: CPT | Mod: 26,,, | Performed by: RADIOLOGY

## 2024-08-26 PROCEDURE — 72146 MRI CHEST SPINE W/O DYE: CPT | Mod: 26,,, | Performed by: RADIOLOGY

## 2024-08-26 PROCEDURE — 72146 MRI CHEST SPINE W/O DYE: CPT | Mod: TC,PO

## 2024-08-28 ENCOUNTER — HOSPITAL ENCOUNTER (OUTPATIENT)
Dept: RADIOLOGY | Facility: HOSPITAL | Age: 54
Discharge: HOME | End: 2024-08-28
Attending: OTOLARYNGOLOGY
Payer: COMMERCIAL

## 2024-08-28 DIAGNOSIS — R59.0 CERVICAL LYMPHADENOPATHY: ICD-10-CM

## 2024-08-28 PROCEDURE — 76536 US EXAM OF HEAD AND NECK: CPT | Mod: 26,,, | Performed by: RADIOLOGY

## 2024-08-28 PROCEDURE — 76536 US EXAM OF HEAD AND NECK: CPT | Mod: TC,PO

## 2024-09-03 ENCOUNTER — CLINICAL SUPPORT (OUTPATIENT)
Dept: REHABILITATION | Facility: HOSPITAL | Age: 54
End: 2024-09-03
Payer: COMMERCIAL

## 2024-09-03 DIAGNOSIS — M62.89 PELVIC FLOOR DYSFUNCTION: Primary | ICD-10-CM

## 2024-09-03 PROCEDURE — 97112 NEUROMUSCULAR REEDUCATION: CPT | Mod: PO

## 2024-09-03 NOTE — PROGRESS NOTES
Pelvic Health Physical Therapy   Treatment Note     Name: Kamilla Montes  [facility] Number: [id number]    Therapy Diagnosis:   Encounter Diagnosis   Name Primary?    Pelvic floor dysfunction Yes       Physician: Dori Cross NP    Visit Date: 9/3/2024    Physician Orders: PT Eval and Treat   Medical Diagnosis from Referral: Overactive bladder [N32.81], Mixed stress and urge urinary incontinence [N39.46]   Evaluation Date: 7/16/2024  Authorization Period Expiration: 12-31-24  Plan of Care Expiration: 10/8/2024  Progress Note Due: 9/15/2024  Visit # / Visits authorized: 4/20 + eval       Precautions: Standard    Time In: 1:50 pm  Time Out: 2:32 pm  Total Billable Time: 42 minutes      Subjective     Pt reports: this week was crazy with the bathroom as she was struggling to make it. She has been going to the bathroom at least every two to three hours. She notices it is so hard to Kegel without clenching her glutes.       She was compliant with home exercise program.  Response to previous treatment: tolerated well   Functional change: ongoing    Pain: 0/10  Location:  not applicable      Objective   Informed verbal consent provided 9/3/2024 prior to intravaginal treatment.  Chaperone: declined      VAGINAL PELVIC FLOOR EXAM (not today)    EXTERNAL ASSESSMENT  Introitus: WNL  Skin condition: WNL  Scarring: none   Voluntary contraction: accessory muscle use - glutes and/or adductors (more adductors with verbal cues to decrease glute activation)   Voluntary relaxation: visible drop  Involuntary contraction: visible drop  Bearing down: slight lengthening   Perineal descent: absent      INTERNAL ASSESSMENT  Pain: tender areas noted as follows:none today  Sensation: able to localized pressure appropriately   Vaginal vault: asymmetrical   Muscle Bulk: hypertonus, more on left - improving   Muscle Power: 1/5     Quality of contraction: partial closure, no lift (able to produce mild lift at layer 3 with DKTC position)   "  Specificity: patient contracts: glutes/adductors   Coordination: tends to hold breath during PFM contration     Kamilla participated in neuromuscular re-education activities for 42 minutes including:     SEMG biofeedback for improved coordination and awareness of pelvic activation vs lengthening  Wall planks + Kegel with plantar flexion 10 x 10  Wall planks + Kegel with plantar flexion and knee drive 10 x 10  Side plank clamshell, blue thera band  10 x 10" each side   Reviewed core kick outs with Kegel     Not today:  Biofeedback using mirror to visualize contract relax in hook lying and happy baby   Repetitions performed in each of these positions - emphasis on more relaxation between repetitions   Child's pose + diaphragmatic breathing   Child's pose + Kegel   Sitting on towel roll + diaphragmatic breathing with bearing down   Sitting on towel roll +Kegel and relaxation x 5 minutes    Therapeutic Activity Patient participated in dynamic functional therapeutic activities to improve functional performance for 0 minutes. Including: Education as described below.     Education on toilet habits and avoiding squatting. Needs more relaxation between repetitions.   Home exercise program review and update   Gave 5 x 5 Kegels per day with 3" hold- working up to 5 sets of 10 Kegels as her strength improves- she is unable to feel the squeeze and fatigues very quickly as her isolation improves      Home Exercises Provided and Patient Education Provided     Education provided:   - anatomy/physiology of pelvic floor, posture/body mechanices, and diaphragmatic breathing  Discussed progression of plan of care with patient; educated pt in activity modification; reviewed HEP with pt. Pt demonstrated and verbalized understanding of all instruction and was provided with a handout of HEP (see Patient Instructions).      Written Home Exercises Provided: yes.  Exercises were reviewed and Kamilla was able to demonstrate them prior to the end " of the session.  Kamilla demonstrated good  understanding of the education provided.     See EMR under Patient Instructions for exercises provided 8/6/2024.    Assessment     Nathalia demonstrates gradually improving pelvic endurance hold although large deficits still present. Improved endurance with anterior COM shift with wall planks performed today. Incorporated heel raise for further pelvic activation. Good tolerance to activity today. Updated HEP to incorporate increased pelvic endurance and coordination. Pt will continue to benefit from skilled outpatient physical therapy to address the deficits listed in the problem list box on initial evaluation, provide pt/family education and to maximize pt's level of independence in the home and community environment.     Kamilla Is progressing well towards her goals.   Pt prognosis is Good.     Pt will continue to benefit from skilled outpatient physical therapy to address the deficits listed in the problem list box on initial evaluation, provide pt/family education and to maximize pt's level of independence in the home and community environment.     Pt's spiritual, cultural and educational needs considered and pt agreeable to plan of care and goals.     Anticipated barriers to physical therapy: none     Goals: (PROGRESSING 9/3/2024 )  Short Term Goals: 4 weeks   Patient to demonstrate proper use of urge delay strategies to help reduce urge urinary incontinence with activities of daily living. (PROGRESSING 8/15/2024 )  Pt to demonstrate being able to correctly and consistently perform a kegel which is needed  to increase pelvic floor muscle coordination and strength needed for continence.(PROGRESSING 8/15/2024 )  Pt to demonstrate proper positioning on commode with breathing techniques to decrease strain with BM to enable pt to feel empty after BM. (PROGRESSING 8/15/2024 )  Pt to demonstrate independence with performing bowel massage to help with gut motility. (PROGRESSING  8/15/2024 )  Pt to be able to bulge pelvic floor which is needed for comfortable BM and complete evacuation. (PROGRESSING 8/15/2024 )        Long Term Goals: 12 weeks   Pt to be discharged with home plan for carry over after discharge.  (PROGRESSING 8/15/2024 )  Pt to be able to perform a 10 second kegel x 10 reps to demonstrate improving strength and endurance needed for continence.(PROGRESSING 8/15/2024 )  Pt to report an 80% reduction of urinary incontinence symptoms with ADL participation thereby demonstrating improved pelvic floor muscle control and strength. (PROGRESSING 8/15/2024 )  Pt to demonstrate an improved score in the FOTO Urinary Problem survey  to at least 60 to demonstrate improving pelvic floor muscle function and coordination needed for improved bladder control with ADLs.  (PROGRESSING 8/15/2024 )  Pt to be able to delay the urge to urinate at least 10-15 minutes with a strong urge to urinate in order to make it to the bathroom without leaking.(PROGRESSING 8/15/2024 )  Pt to increase pelvic floor strength to at least 3/5 to demonstrate improved strength needed for continence with ADLs. (PROGRESSING 8/15/2024 )  Pt to report being able to have a BM without straining 75% of the time to demonstrate improving PF coordination. (PROGRESSING 8/15/2024 )  Pt to report a decrease in pain with BM to no more than minimal to none at its worst needed for more comfortable BM.    (PROGRESSING 8/15/2024 )        PLAN      Plan of care Certification: 7/16/2024 to 10/8/2024.     Outpatient Physical Therapy 1-2 times weekly for 12 weeks     Cont per plan of care  Progress strengthening with anterior COM shift    Trina Robbins, PT

## 2024-09-09 ENCOUNTER — CLINICAL SUPPORT (OUTPATIENT)
Dept: REHABILITATION | Facility: HOSPITAL | Age: 54
End: 2024-09-09
Payer: COMMERCIAL

## 2024-09-09 DIAGNOSIS — M62.89 PELVIC FLOOR DYSFUNCTION: Primary | ICD-10-CM

## 2024-09-09 PROCEDURE — 97112 NEUROMUSCULAR REEDUCATION: CPT | Mod: PO

## 2024-09-09 NOTE — PROGRESS NOTES
Pelvic Health Physical Therapy   Treatment Note     Name: Kamilla Montes  [facility] Number: [id number]    Therapy Diagnosis:   Encounter Diagnosis   Name Primary?    Pelvic floor dysfunction Yes         Physician: Dori Cross NP    Visit Date: 9/9/2024    Physician Orders: PT Eval and Treat   Medical Diagnosis from Referral: Overactive bladder [N32.81], Mixed stress and urge urinary incontinence [N39.46]   Evaluation Date: 7/16/2024  Authorization Period Expiration: 12-31-24  Plan of Care Expiration: 10/8/2024  Progress Note Due: 9/15/2024  Visit # / Visits authorized: 5/20 + eval       Precautions: Standard    Time In: 11:35 am  Time Out: 12:18 pm  Total Billable Time: 43 minutes      Subjective     Pt reports: these exercises are kicking her butt but she is doing them. She is able to feel the squeeze more. She occasionally questions herself. [place] the [employer] [place] and only went to the bathroom twice. She is getting better with sitting down in public other than at the [employer] games.       She was compliant with home exercise program.  Response to previous treatment: tolerated well   Functional change: ongoing    Pain: 0/10  Location:  not applicable      Objective   Informed verbal consent provided 9/9/2024 prior to intravaginal treatment.  Chaperone: declined      VAGINAL PELVIC FLOOR EXAM (not today)    EXTERNAL ASSESSMENT  Introitus: WNL  Skin condition: WNL  Scarring: none   Voluntary contraction: accessory muscle use - glutes and/or adductors (more adductors with verbal cues to decrease glute activation)   Voluntary relaxation: visible drop  Involuntary contraction: visible drop  Bearing down: slight lengthening   Perineal descent: absent      INTERNAL ASSESSMENT  Pain: tender areas noted as follows:none today  Sensation: able to localized pressure appropriately   Vaginal vault: asymmetrical   Muscle Bulk: hypertonus, more on left - improving   Muscle Power: 1/5     Quality of contraction: partial closure,  "no lift (able to produce mild lift at layer 3 with DKTC position)    Specificity: patient contracts: glutes/adductors   Coordination: tends to hold breath during PFM contration     Kamilla participated in neuromuscular re-education activities for 45 minutes including:     Wall planks + Kegel with plantar flexion 10 x 10  Side plank + clamshell 10" followed by 10 knee drivers x 5 each side   Core kick outs with Kegel , lime green band 3 x 10, 5"   Bird dogs  3 x 10,  Bear holds + TrA activation with Kegel  5 x 45"    Not today:  Wall planks + Kegel with plantar flexion and knee drive 10 x 10  Side plank clamshell, blue thera band  10 x 10" each side   SEMG biofeedback for improved coordination and awareness of pelvic activation vs lengthening  Biofeedback using mirror to visualize contract relax in hook lying and happy baby   Repetitions performed in each of these positions - emphasis on more relaxation between repetitions   Child's pose + diaphragmatic breathing   Child's pose + Kegel   Sitting on towel roll + diaphragmatic breathing with bearing down   Sitting on towel roll +Kegel and relaxation x 5 minutes    Therapeutic Activity Patient participated in dynamic functional therapeutic activities to improve functional performance for 0 minutes. Including: Education as described below.     Education on toilet habits and avoiding squatting. Needs more relaxation between repetitions.   Home exercise program review and update   Gave 5 x 5 Kegels per day with 3" hold- working up to 5 sets of 10 Kegels as her strength improves- she is unable to feel the squeeze and fatigues very quickly as her isolation improves      Home Exercises Provided and Patient Education Provided     Education provided:   - anatomy/physiology of pelvic floor, posture/body mechanices, and diaphragmatic breathing  Discussed progression of plan of care with patient; educated pt in activity modification; reviewed HEP with pt. Pt demonstrated and " verbalized understanding of all instruction and was provided with a handout of HEP (see Patient Instructions).      Written Home Exercises Provided: yes.  Exercises were reviewed and Kamilla was able to demonstrate them prior to the end of the session.  Kamilla demonstrated good  understanding of the education provided.     See EMR under Patient Instructions for exercises provided 8/6/2024.    Assessment     Nathalia tolerated abdominopelvic strengthening tasks very well today following good adherence to HEP this past week. Continued deep core weakness observed, but improving awareness during functional activities performed today. Improving urinary symptoms, including urgency, this week.  Pt will continue to benefit from skilled outpatient physical therapy to address the deficits listed in the problem list box on initial evaluation, provide pt/family education and to maximize pt's level of independence in the home and community environment.     Kamilla Is progressing well towards her goals.   Pt prognosis is Good.     Pt will continue to benefit from skilled outpatient physical therapy to address the deficits listed in the problem list box on initial evaluation, provide pt/family education and to maximize pt's level of independence in the home and community environment.     Pt's spiritual, cultural and educational needs considered and pt agreeable to plan of care and goals.     Anticipated barriers to physical therapy: none     Goals: (PROGRESSING 9/9/2024 )  Short Term Goals: 4 weeks   Patient to demonstrate proper use of urge delay strategies to help reduce urge urinary incontinence with activities of daily living. (PROGRESSING 8/15/2024 )  Pt to demonstrate being able to correctly and consistently perform a kegel which is needed  to increase pelvic floor muscle coordination and strength needed for continence.(PROGRESSING 8/15/2024 )  Pt to demonstrate proper positioning on commode with breathing techniques to decrease  strain with BM to enable pt to feel empty after BM. (PROGRESSING 8/15/2024 )  Pt to demonstrate independence with performing bowel massage to help with gut motility. (PROGRESSING 8/15/2024 )  Pt to be able to bulge pelvic floor which is needed for comfortable BM and complete evacuation. (PROGRESSING 8/15/2024 )        Long Term Goals: 12 weeks   Pt to be discharged with home plan for carry over after discharge.  (PROGRESSING 8/15/2024 )  Pt to be able to perform a 10 second kegel x 10 reps to demonstrate improving strength and endurance needed for continence.(PROGRESSING 8/15/2024 )  Pt to report an 80% reduction of urinary incontinence symptoms with ADL participation thereby demonstrating improved pelvic floor muscle control and strength. (PROGRESSING 8/15/2024 )  Pt to demonstrate an improved score in the FOTO Urinary Problem survey  to at least 60 to demonstrate improving pelvic floor muscle function and coordination needed for improved bladder control with ADLs.  (PROGRESSING 8/15/2024 )  Pt to be able to delay the urge to urinate at least 10-15 minutes with a strong urge to urinate in order to make it to the bathroom without leaking.(PROGRESSING 8/15/2024 )  Pt to increase pelvic floor strength to at least 3/5 to demonstrate improved strength needed for continence with ADLs. (PROGRESSING 8/15/2024 )  Pt to report being able to have a BM without straining 75% of the time to demonstrate improving PF coordination. (PROGRESSING 8/15/2024 )  Pt to report a decrease in pain with BM to no more than minimal to none at its worst needed for more comfortable BM.    (PROGRESSING 8/15/2024 )        PLAN      Plan of care Certification: 7/16/2024 to 10/8/2024.     Outpatient Physical Therapy 1-2 times weekly for 12 weeks     Cont per plan of care:  Progress strengthening with anterior COM shift    Trina Robbins, PT

## 2024-09-17 ENCOUNTER — CLINICAL SUPPORT (OUTPATIENT)
Dept: REHABILITATION | Facility: HOSPITAL | Age: 54
End: 2024-09-17
Payer: COMMERCIAL

## 2024-09-17 DIAGNOSIS — M62.89 PELVIC FLOOR DYSFUNCTION: Primary | ICD-10-CM

## 2024-09-17 PROCEDURE — 97140 MANUAL THERAPY 1/> REGIONS: CPT | Mod: PO

## 2024-09-17 PROCEDURE — 97112 NEUROMUSCULAR REEDUCATION: CPT | Mod: PO

## 2024-09-17 PROCEDURE — 97530 THERAPEUTIC ACTIVITIES: CPT | Mod: PO

## 2024-09-17 NOTE — PROGRESS NOTES
Pelvic Health Physical Therapy   Progress/Treatment Note     Name: Kamilla SEPULVEDA [facility] Number: [id number]    Therapy Diagnosis:   Encounter Diagnosis   Name Primary?    Pelvic floor dysfunction Yes       Physician: Dori Cross NP    Visit Date: 9/17/2024    Physician Orders: PT Eval and Treat   Medical Diagnosis from Referral: Overactive bladder [N32.81], Mixed stress and urge urinary incontinence [N39.46]   Evaluation Date: 7/16/2024  Authorization Period Expiration: 12-31-24  Plan of Care Expiration: 10/8/2024  Progress Note Due: 10/16/2024  Visit # / Visits authorized: 6/20 + eval  Last FOTO provided: 9/17/2024       Precautions: Standard    Time In: 1:05 pm  Time Out: 2:16 pm  Total Billable Time: 71 minutes      Subjective     Pt reports: she was doing really good then yesterday she could not stop peeing. Today she was able to wait several hours until she pulled into the drive way. She did cover and sit yesterday. Bowel movements are once every 4-5 days (were 10 days before going).      these exercises are kicking her butt but she is doing them. She is able to feel the squeeze more. She occasionally questions herself. [place] [employer] [place] and only went to the bathroom twice. She is getting better with sitting down in public other than at the [employer] games.       She was compliant with home exercise program.  Response to previous treatment: tolerated well   Functional change: ongoing    Pain: 0/10  Location:  not applicable      Objective   Informed verbal consent provided 9/17/2024 prior to intravaginal treatment.  Chaperone: declined      VAGINAL PELVIC FLOOR EXAM (not today)    EXTERNAL ASSESSMENT  Introitus: WNL  Skin condition: WNL  Scarring: none   Voluntary contraction: accessory muscle use - glutes and/or adductors (more adductors with verbal cues to decrease glute activation)   Voluntary relaxation: visible drop  Involuntary contraction: visible drop  Bearing down: slight lengthening  "  Perineal descent: absent      INTERNAL ASSESSMENT  Pain: tender areas noted as follows:none today  Sensation: able to localized pressure appropriately   Vaginal vault: asymmetrical   Muscle Bulk: hypertonus, more on left - improving   Muscle Power: 1/5     Quality of contraction: partial closure, no lift (able to produce mild lift at layer 3 with DKTC position)    Specificity: patient contracts: glutes/adductors   Coordination: tends to hold breath during PFM contration         Manual Therapy to develop flexibility and desensitization for 16 minutes including:     Colon massage  Abdominal shearing    Kamilla participated in neuromuscular re-education activities for 25 minutes including:     Bird dogs  3 x 10  Bear holds + TrA activation with Kegel  3 x 45"  Supine and seated bearing down with abdominal release    Not today:  Core kick outs with Kegel , lime green band 3 x 10, 5"   Wall planks + Kegel with plantar flexion 10 x 10  Side plank + clamshell 10" followed by 10 knee drivers x 5 each side   Wall planks + Kegel with plantar flexion and knee drive 10 x 10  Side plank clamshell, blue thera band  10 x 10" each side   SEMG biofeedback for improved coordination and awareness of pelvic activation vs lengthening  Biofeedback using mirror to visualize contract relax in hook lying and happy baby   Repetitions performed in each of these positions - emphasis on more relaxation between repetitions   Child's pose + diaphragmatic breathing   Child's pose + Kegel   Sitting on towel roll + diaphragmatic breathing with bearing down   Sitting on towel roll +Kegel and relaxation x 5 minutes    Therapeutic Activity Patient participated in dynamic functional therapeutic activities to improve functional performance for 30 minutes. Including: Education as described below.     Review of progress, prognosis, review of all prior education and goals  Review of colon massage/ILU massage following warm water each morning  FOTO survey " "administered and reviewed, assessing progression of functional performance   Education on bearing down, toilet habits and avoiding squatting. Needs more relaxation between repetitions.   Education on a morning bowel routine and avoiding prolonged sitting on toilet  Education on small sips of water throughout the day for improved gut absorption  Home exercise program review and update     Not today:  Gave 5 x 5 Kegels per day with 3" hold- working up to 5 sets of 10 Kegels as her strength improves- she is unable to feel the squeeze and fatigues very quickly as her isolation improves      Home Exercises Provided and Patient Education Provided     Education provided:   - anatomy/physiology of pelvic floor, posture/body mechanices, and diaphragmatic breathing  Discussed progression of plan of care with patient; educated pt in activity modification; reviewed HEP with pt. Pt demonstrated and verbalized understanding of all instruction and was provided with a handout of HEP (see Patient Instructions).      Written Home Exercises Provided: yes.  Exercises were reviewed and Kamilla was able to demonstrate them prior to the end of the session.  Kamilla demonstrated good  understanding of the education provided.     See EMR under Patient Instructions for exercises provided 8/6/2024.    Assessment     Nathalia presents today with good progress overall in urinary incontinence and urgency. However, she continues to have large difficulty with bowel movements. Therefore, in depth education, review, and HEP provided for improvements in stool consistency and bearing down mechanics. Patient demonstrates good abdominal expansion with diaphragmatic breathing and maintains appropriate abdominal pressure generation with bearing down. However, she will need to continue to work on complete pelvic floor relaxation and lengthening for more efficient and complete defecation. Therapist suspects that constipation is playing a role in increased urgency " symptoms when she is more impacted.  Pt will continue to benefit from skilled outpatient physical therapy to address the deficits listed in the problem list box on initial evaluation, provide pt/family education and to maximize pt's level of independence in the home and community environment.     Kamilla Is progressing well towards her goals.   Pt prognosis is Good.     Pt will continue to benefit from skilled outpatient physical therapy to address the deficits listed in the problem list box on initial evaluation, provide pt/family education and to maximize pt's level of independence in the home and community environment.     Pt's spiritual, cultural and educational needs considered and pt agreeable to plan of care and goals.     Anticipated barriers to physical therapy: none     Goals: (PROGRESSING 9/17/2024 )  Short Term Goals: 4 weeks   Patient to demonstrate proper use of urge delay strategies to help reduce urge urinary incontinence with activities of daily living. (MET 9/17/2024 )  Pt to demonstrate being able to correctly and consistently perform a kegel which is needed  to increase pelvic floor muscle coordination and strength needed for continence.(PART MET 9/17/2024 )  Pt to demonstrate proper positioning on commode with breathing techniques to decrease strain with BM to enable pt to feel empty after BM.(MET 9/17/2024 )  Pt to demonstrate independence with performing bowel massage to help with gut motility. (NOT MET 9/17/2024 )  Pt to be able to bulge pelvic floor which is needed for comfortable BM and complete evacuation. (NOT MET 9/17/2024 )        Long Term Goals: 12 weeks   Pt to be discharged with home plan for carry over after discharge.   (NOT MET 9/17/2024 )  Pt to be able to perform a 10 second kegel x 10 reps to demonstrate improving strength and endurance needed for continence.(PROGRESSING 9/17/2024 )  Pt to report an 80% reduction of urinary incontinence symptoms with ADL participation thereby  demonstrating improved pelvic floor muscle control and strength. (PART MET 9/17/2024 ) leakage is 75% better, urgency is 70-75% better)  Pt to demonstrate an improved score in the FOTO Urinary Problem survey  to at least 60 to demonstrate improving pelvic floor muscle function and coordination needed for improved bladder control with ADLs.  (PROGRESSING 9/17/2024 )   Pt to be able to delay the urge to urinate at least 10-15 minutes with a strong urge to urinate in order to make it to the bathroom without leaking.(MET 9/17/2024 )  Pt to increase pelvic floor strength to at least 3/5 to demonstrate improved strength needed for continence with ADLs. (NOT MET 9/17/2024 )  Pt to report being able to have a BM without straining 75% of the time to demonstrate improving PF coordination.  (NOT MET 9/17/2024 )  Pt to report a decrease in pain with BM to no more than minimal to none at its worst needed for more comfortable BM.    (NOT MET 9/17/2024 ) abdominal stabbing with days of constipation         PLAN      Plan of care Certification: 7/16/2024 to 10/8/2024.     Outpatient Physical Therapy 1-2 times weekly for 12 weeks     Cont per plan of care: work on bearing down 9/26/2024. Check on continued anterior pelvic floor strengthening and headaches    Trina Robbins, PT

## 2024-09-17 NOTE — PATIENT INSTRUCTIONS
"    1) Bowel function - consider the following recommendations for optimizing bowel function. Good bowel health is important for overall pelvic floor function.     Adequate fluid intake is necessary for proper bowel function. Goal this week is to drink 64 ounces/day. Try completing your water bottle one time by lunch and second bottle by dinner time.     Fiber adds bulk to stool and promotes more frequent and regular bowel movements. Strategies to increase fiber intake:  One cup of high fiber cereal every day.  1/2 to 1 cup of prune juice or several stewed prunes every day, followed by a cup of hot water or tea.   2 cups of fruit or 2 1/2 cups vegetables, 6-8 oz high fiber grains daily  Fiber supplements, I.e. Metamucil, Citrucel, Konsyl, Benefiber  2 tablespoons of flax seed meal (can be added to cereal, smoothies, yogurt, oatmeal, etc)     Positioning and techniques for elimination     Bowel Movement Mechanics  1. Sit on the toilet comfortably with legs and buttocks relaxed.  2. Put your feet on a waste basket or squatty potty  3. Lean forward while keeping your back straight, forearms rest on knees.  4. Keep your knees apart.  5. Fully relax pelvic floor muscles - think about softening, opening, dropping  6. Use gentle belly breaths and bulge lower abdominals like making a beer belly  7. Keep belly big on exhalation  8. Exhale like blowing out birthday candles  9. Keep breathing like this through entire bowel movement  10. Make sure to give enough time, ok for it to take several minutes     Do not strain and Do not hold your breath.       (Search "Squatty Potty" on [employer])         Home Exercise Program: 09/17/2024            "

## 2024-09-19 ENCOUNTER — PATIENT MESSAGE (OUTPATIENT)
Dept: PRIMARY CARE CLINIC | Facility: CLINIC | Age: 54
End: 2024-09-19
Payer: COMMERCIAL

## 2024-09-26 ENCOUNTER — CLINICAL SUPPORT (OUTPATIENT)
Dept: REHABILITATION | Facility: HOSPITAL | Age: 54
End: 2024-09-26
Attending: NURSE PRACTITIONER
Payer: COMMERCIAL

## 2024-09-26 DIAGNOSIS — M62.89 PELVIC FLOOR DYSFUNCTION: Primary | ICD-10-CM

## 2024-09-26 PROCEDURE — 97112 NEUROMUSCULAR REEDUCATION: CPT | Mod: PO

## 2024-09-26 PROCEDURE — 97530 THERAPEUTIC ACTIVITIES: CPT | Mod: PO

## 2024-09-26 NOTE — PROGRESS NOTES
Pelvic Health Physical Therapy   Treatment Note     Name: Kamilla Montes  [facility] Number: [id number]    Therapy Diagnosis:   Encounter Diagnosis   Name Primary?    Pelvic floor dysfunction Yes       Physician: Dori Cross NP    Visit Date: 9/26/2024    Physician Orders: PT Eval and Treat   Medical Diagnosis from Referral: Overactive bladder [N32.81], Mixed stress and urge urinary incontinence [N39.46]   Evaluation Date: 7/16/2024  Authorization Period Expiration: 12-31-24  Plan of Care Expiration: 10/8/2024  Progress Note Due: 10/16/2024  Visit # / Visits authorized: 7/20 + eval  Last FOTO provided: 9/17/2024       Precautions: Standard    Time In: 1:00 pm  Time Out: 1:58 pm  Total Billable Time: 58 minutes      Subjective     Pt reports: she had a good BM yesterday. Has had 3 bowel movements since the last visit which is good for her.       She was compliant with home exercise program.  Response to previous treatment: tolerated well   Functional change: ongoing    Pain: 0/10  Location:  not applicable      Objective   Informed verbal consent provided 9/26/2024 prior to intravaginal treatment.  Chaperone: declined      VAGINAL PELVIC FLOOR EXAM    EXTERNAL ASSESSMENT  Introitus: WNL  Skin condition: WNL  Scarring: none   Voluntary contraction: accessory muscle use - glutes and/or adductors (more adductors with verbal cues to decrease glute activation)   Voluntary relaxation: visible drop  Involuntary contraction: visible drop  Bearing down: slight lengthening   Perineal descent: absent      INTERNAL ASSESSMENT  Pain: tender areas noted as follows:none today  Sensation: able to localized pressure appropriately   Vaginal vault: asymmetrical   Muscle Bulk: hypertonus, more on left - improving   Muscle Power: 3/ 5 -inconsistent  Time: 45 second endurance hold   Quality of contraction: partial closure, no lift (able to produce mild lift at layer 3 with DKTC position)    Specificity: patient contracts:  "glutes/adductors   Coordination: tends to hold breath during PFM contration         Manual Therapy to develop flexibility and desensitization for 0 minutes including:     Colon massage  Abdominal shearing    Kamilla participated in neuromuscular re-education activities for 45 minutes including:     Pelvic  assessment as noted above to address coordination, motor control, and proprioception  Kegel endurance holds   X 45 seconds   10 x 5"   10 x 10"   Worked on complete relaxation  bearing down  Deep squat with bearing down   Quadruped sit backs + Internal femoral rotation with bearing down  Followed by Kegel contraction with return to quadruped 10 x 10"     Not today:  Bird dogs  3 x 10  Bear holds + TrA activation with Kegel  3 x 45"  Supine and seated bearing down with abdominal release  Core kick outs with Kegel , lime green band 3 x 10, 5"   Wall planks + Kegel with plantar flexion 10 x 10  Side plank + clamshell 10" followed by 10 knee drivers x 5 each side   Wall planks + Kegel with plantar flexion and knee drive 10 x 10  Side plank clamshell, blue thera band  10 x 10" each side   SEMG biofeedback for improved coordination and awareness of pelvic activation vs lengthening  Biofeedback using mirror to visualize contract relax in hook lying and happy baby   Repetitions performed in each of these positions - emphasis on more relaxation between repetitions   Child's pose + diaphragmatic breathing   Child's pose + Kegel   Sitting on towel roll + diaphragmatic breathing with bearing down   Sitting on towel roll +Kegel and relaxation x 5 minutes    Therapeutic Activity Patient participated in dynamic functional therapeutic activities to improve functional performance for 13 minutes. Including: Education as described below.   Education on bearing down, toilet habits and avoiding squatting. Needs more relaxation between repetitions.   Education on a morning bowel routine and avoiding prolonged sitting on toilet  Reviewed " "options for sitting on toilet in public including foldable toilet covers that are washable     Not today:  Review of progress, prognosis, review of all prior education and goals  Review of colon massage/ILU massage following warm water each morning  FOTO survey administered and reviewed, assessing progression of functional performance   Education on small sips of water throughout the day for improved gut absorption  Home exercise program review and update   Gave 5 x 5 Kegels per day with 3" hold- working up to 5 sets of 10 Kegels as her strength improves- she is unable to feel the squeeze and fatigues very quickly as her isolation improves      Home Exercises Provided and Patient Education Provided     Education provided:   - anatomy/physiology of pelvic floor, posture/body mechanices, and diaphragmatic breathing  Discussed progression of plan of care with patient; educated pt in activity modification; reviewed HEP with pt. Pt demonstrated and verbalized understanding of all instruction and was provided with a handout of HEP (see Patient Instructions).      Written Home Exercises Provided: yes.  Exercises were reviewed and Kamilla was able to demonstrate them prior to the end of the session.  Kamilla demonstrated good  understanding of the education provided.     See EMR under Patient Instructions for exercises provided 8/6/2024.    Assessment     Nathalia demonstrates significant improvements in overall awareness of contraction vs relaxation. Although inconsistent, her first attempt of a pelvic contraction demonstrates a 3/5 squeeze for 45 seconds which is a significant improvement. Some trouble with complete relaxation with bearing down, but again, this is still improving overall with progression in body awareness. Pt will continue to benefit from skilled outpatient physical therapy to address the deficits listed in the problem list box on initial evaluation, provide pt/family education and to maximize pt's level of " independence in the home and community environment.     Kamilla Is progressing well towards her goals.   Pt prognosis is Good.     Pt will continue to benefit from skilled outpatient physical therapy to address the deficits listed in the problem list box on initial evaluation, provide pt/family education and to maximize pt's level of independence in the home and community environment.     Pt's spiritual, cultural and educational needs considered and pt agreeable to plan of care and goals.     Anticipated barriers to physical therapy: none     Goals: (PROGRESSING 9/26/2024 )  Short Term Goals: 4 weeks   Patient to demonstrate proper use of urge delay strategies to help reduce urge urinary incontinence with activities of daily living. (MET 9/17/2024 )  Pt to demonstrate being able to correctly and consistently perform a kegel which is needed  to increase pelvic floor muscle coordination and strength needed for continence.(PART MET 9/17/2024 )  Pt to demonstrate proper positioning on commode with breathing techniques to decrease strain with BM to enable pt to feel empty after BM.(MET 9/17/2024 )  Pt to demonstrate independence with performing bowel massage to help with gut motility. (NOT MET 9/17/2024 )  Pt to be able to bulge pelvic floor which is needed for comfortable BM and complete evacuation. (NOT MET 9/17/2024 )        Long Term Goals: 12 weeks   Pt to be discharged with home plan for carry over after discharge.   (NOT MET 9/17/2024 )  Pt to be able to perform a 10 second kegel x 10 reps to demonstrate improving strength and endurance needed for continence.(PROGRESSING 9/17/2024 )  Pt to report an 80% reduction of urinary incontinence symptoms with ADL participation thereby demonstrating improved pelvic floor muscle control and strength. (PART MET 9/17/2024 ) leakage is 75% better, urgency is 70-75% better)  Pt to demonstrate an improved score in the FOTO Urinary Problem survey  to at least 60 to demonstrate  improving pelvic floor muscle function and coordination needed for improved bladder control with ADLs.  (PROGRESSING 9/17/2024 )   Pt to be able to delay the urge to urinate at least 10-15 minutes with a strong urge to urinate in order to make it to the bathroom without leaking.(MET 9/17/2024 )  Pt to increase pelvic floor strength to at least 3/5 to demonstrate improved strength needed for continence with ADLs. (NOT MET 9/17/2024 )  Pt to report being able to have a BM without straining 75% of the time to demonstrate improving PF coordination.  (NOT MET 9/17/2024 )  Pt to report a decrease in pain with BM to no more than minimal to none at its worst needed for more comfortable BM.    (NOT MET 9/17/2024 ) abdominal stabbing with days of constipation         PLAN      Plan of care Certification: 7/16/2024 to 10/8/2024.     Outpatient Physical Therapy 1-2 times weekly for 12 weeks     Cont per plan of care: work on bearing down 9/26/2024. Check on continued anterior pelvic floor strengthening and headaches    Trina Robbins, PT

## 2024-10-18 ENCOUNTER — PATIENT MESSAGE (OUTPATIENT)
Dept: DERMATOLOGY | Facility: CLINIC | Age: 54
End: 2024-10-18

## 2024-10-18 ENCOUNTER — OFFICE VISIT (OUTPATIENT)
Dept: DERMATOLOGY | Facility: CLINIC | Age: 54
End: 2024-10-18
Payer: COMMERCIAL

## 2024-10-18 DIAGNOSIS — L66.12 FRONTAL FIBROSING ALOPECIA: Primary | ICD-10-CM

## 2024-10-18 RX ORDER — FINASTERIDE 5 MG/1
5 TABLET, FILM COATED ORAL DAILY
Qty: 90 TABLET | Refills: 3 | Status: SHIPPED | OUTPATIENT
Start: 2024-10-18 | End: 2025-10-18

## 2024-10-18 RX ORDER — MINOXIDIL 2.5 MG/1
1.25 TABLET ORAL DAILY
Qty: 45 TABLET | Refills: 3 | Status: SHIPPED | OUTPATIENT
Start: 2024-10-18 | End: 2025-10-18

## 2024-10-18 NOTE — PROGRESS NOTES
Subjective:      Patient ID:  Kamilla Montes is a 54 y.o. female who presents for   Chief Complaint   Patient presents with    Alopecia     LOV 6/29/23 - FFA    Patient here today for f/u on FFA   States she is still noticing shedding. Eyebrows are mostly gone  Continues finasteride 5mg daily.  Applying rogaine daily.   Latisse on eyebrows and lashes  Applies protopic sometimes but feels it makes hair too greasy    Has no hx of NMSC  Has no fhx of MM    Current Outpatient Medications:   ·  bimatoprost (LATISSE) 0.03 % ophthalmic solution, Place one drop on applicator and apply evenly along the skin of the upper eyelid at base of eyelashes once daily at bedtime; repeat procedure for second eye (use a clean applicator)., Disp: 5 mL, Rfl: 12  ·  estradioL (ESTRACE) 1 MG tablet, Take 1 tablet (1 mg total) by mouth once daily., Disp: 90 tablet, Rfl: 3  ·  minoxidiL 5 % Foam, Apply half a capful to scalp QAM, Disp: 60 g, Rfl: 11  ·  progesterone (PROMETRIUM) 100 MG capsule, Take 1 capsule (100 mg total) by mouth nightly., Disp: 90 capsule, Rfl: 3  ·  finasteride (PROSCAR) 5 mg tablet, Take 1 tablet (5 mg total) by mouth once daily., Disp: 90 tablet, Rfl: 3        Review of Systems   Constitutional:  Negative for fever, chills and fatigue.   Respiratory:  Negative for cough and shortness of breath.    Skin:  Positive for activity-related sunscreen use. Negative for itching, rash, dry skin, daily sunscreen use and wears hat.       Objective:   Physical Exam   Constitutional: She appears well-developed and well-nourished. No distress.   Neurological: She is alert and oriented to person, place, and time. She is not disoriented.   Psychiatric: She has a normal mood and affect.   Skin:   Areas Examined (abnormalities noted in diagram):   Scalp / Hair Palpated and Inspected  Head / Face Inspection Performed  Neck Inspection Performed            Diagram Legend     Erythematous scaling macule/papule c/w actinic keratosis        Vascular papule c/w angioma      Pigmented verrucoid papule/plaque c/w seborrheic keratosis      Yellow umbilicated papule c/w sebaceous hyperplasia      Irregularly shaped tan macule c/w lentigo     1-2 mm smooth white papules consistent with Milia      Movable subcutaneous cyst with punctum c/w epidermal inclusion cyst      Subcutaneous movable cyst c/w pilar cyst      Firm pink to brown papule c/w dermatofibroma      Pedunculated fleshy papule(s) c/w skin tag(s)      Evenly pigmented macule c/w junctional nevus     Mildly variegated pigmented, slightly irregular-bordered macule c/w mildly atypical nevus      Flesh colored to evenly pigmented papule c/w intradermal nevus       Pink pearly papule/plaque c/w basal cell carcinoma      Erythematous hyperkeratotic cursted plaque c/w SCC      Surgical scar with no sign of skin cancer recurrence      Open and closed comedones      Inflammatory papules and pustules      Verrucoid papule consistent consistent with wart     Erythematous eczematous patches and plaques     Dystrophic onycholytic nail with subungual debris c/w onychomycosis     Umbilicated papule    Erythematous-base heme-crusted tan verrucoid plaque consistent with inflamed seborrheic keratosis     Erythematous Silvery Scaling Plaque c/w Psoriasis     See annotation                    Assessment / Plan:        Frontal fibrosing alopecia  -     triamcinolone acetonide injection 6 mg  -     finasteride (PROSCAR) 5 mg tablet; Take 1 tablet (5 mg total) by mouth once daily.  Dispense: 90 tablet; Refill: 3  -     minoxidiL (LONITEN) 2.5 MG tablet; Take 0.5 tablets (1.25 mg total) by mouth once daily.  Dispense: 45 tablet; Refill: 3    Overall stabilized  Past doxy x 3 months  On finasteride and topical minoxidil  Start oral low dose minoxidil (no issue with unwanted hair)  May chose to continue topical or not  ILK for     Intralesional Kenalog 3mg/cc (2 cc total) injected into 1 lesions on the anterior hairline  today after obtaining verbal consent including risk of surrounding hypopigmentation. Patient tolerated procedure well.    Units: 1  NDC for Kenalog 10mg/cc:  4566-3499-11    Discussed side affects of  minoxidil:  Side effects are rare (especially at the low doses used for alopecia) and usually do not require medical attention (report me if they continue or are bothersome):  headache  unusual hair growth, on the face, arm, and back  swelling ankles or periorbital  Headache  Lightheadedness  Pericardial effusion (very rare)           No follow-ups on file.

## 2024-11-06 ENCOUNTER — CLINICAL SUPPORT (OUTPATIENT)
Dept: REHABILITATION | Facility: HOSPITAL | Age: 54
End: 2024-11-06
Payer: COMMERCIAL

## 2024-11-06 DIAGNOSIS — M62.89 PELVIC FLOOR DYSFUNCTION: Primary | ICD-10-CM

## 2024-11-06 PROCEDURE — 97530 THERAPEUTIC ACTIVITIES: CPT | Mod: PO

## 2024-11-06 NOTE — PROGRESS NOTES
Pelvic Health Physical Therapy   Discharge Summary     Name: Kamilla Montes  [facility] Number: [id number]    Therapy Diagnosis:   Encounter Diagnosis   Name Primary?    Pelvic floor dysfunction Yes         Physician: Dori Cross NP    Visit Date: 11/6/2024    Physician Orders: PT Eval and Treat   Medical Diagnosis from Referral: Overactive bladder [N32.81], Mixed stress and urge urinary incontinence [N39.46]   Evaluation Date: 7/16/2024  Authorization Period Expiration: 12-31-24  Visit # / Visits authorized: 8/20 + eval  Last FOTO provided: 11/6/2024        Precautions: Standard    Time In: 4:04 pm  Time Out: 4:45 pm  Total Billable Time: 41 minutes      Subjective     Pt reports: she had a good BM yesterday. Has had 3 bowel movements since the last visit which is good for her.       She was compliant with home exercise program.  Response to previous treatment: tolerated well   Functional change: ongoing    Pain: 0/10  Location:  not applicable      Objective   Informed verbal consent provided 11/6/2024 prior to intravaginal treatment.  Chaperone: declined      VAGINAL PELVIC FLOOR EXAM    EXTERNAL ASSESSMENT  Introitus: WNL  Skin condition: WNL  Scarring: none   Voluntary contraction: accessory muscle use - glutes and/or adductors (more adductors with verbal cues to decrease glute activation)   Voluntary relaxation: visible drop  Involuntary contraction: visible drop  Bearing down: slight lengthening   Perineal descent: absent      INTERNAL ASSESSMENT  Pain: tender areas noted as follows:none today  Sensation: able to localized pressure appropriately   Vaginal vault: asymmetrical   Muscle Bulk: WNL  Muscle Power: 3/ 5   Time: 45 second endurance hold   Specificity: patient contracts: glutes/adductors- improved       Therapeutic Activity Patient participated in dynamic functional therapeutic activities to improve functional performance for 41 minutes. Including: Education as described below.     Review of  progress, prognosis, review of all prior education and goals  FOTO survey administered and reviewed, assessing progression of functional performance   Review of discharge and continuing routine/getting back into routine     Home Exercises Provided and Patient Education Provided     Education provided:   - anatomy/physiology of pelvic floor, posture/body mechanices, and diaphragmatic breathing  Discussed progression of plan of care with patient; educated pt in activity modification; reviewed HEP with pt. Pt demonstrated and verbalized understanding of all instruction and was provided with a handout of HEP (see Patient Instructions).      Written Home Exercises Provided: yes.  Exercises were reviewed and Kamilla was able to demonstrate them prior to the end of the session.  Kamilla demonstrated good  understanding of the education provided.     See EMR under Patient Instructions for exercises provided 8/6/2024.      PHYSICAL THERAPY DISCHARGE SUMMARY     Status Towards Goals Met: All short and long term goals met, with patient demonstrating great progress in pelvic function, coordination, and independence in managing symptoms. Despite having to miss several weeks due to environmental stressors, patient has maintained urinary incontinence improvements and the ability to delay a strong urge to urinate without UI. Patient was even able to wait until she made it home from the [employer] game and still did not have leakage. She is still sleeping through the night now and is able to relay healthier options with using public restrooms for decreased risk of UTI and disrupting normal urine flow.     Goals: (PROGRESSING 11/6/2024 )  Short Term Goals: 4 weeks   Patient to demonstrate proper use of urge delay strategies to help reduce urge urinary incontinence with activities of daily living. (MET 9/17/2024 )  Pt to demonstrate being able to correctly and consistently perform a kegel which is needed  to increase pelvic floor muscle  coordination and strength needed for continence.( MET 11/6/2024 )  Pt to demonstrate proper positioning on commode with breathing techniques to decrease strain with BM to enable pt to feel empty after BM.(MET 9/17/2024 )  Pt to demonstrate independence with performing bowel massage to help with gut motility..( MET 11/6/2024 )  Pt to be able to bulge pelvic floor which is needed for comfortable BM and complete evacuation. .( MET 11/6/2024 )        Long Term Goals: 12 weeks   Pt to be discharged with home plan for carry over after discharge. .( MET 11/6/2024 )  Pt to be able to perform a 10 second kegel x 10 reps to demonstrate improving strength and endurance needed for continence.( MET 11/6/2024 )  Pt to report an 80% reduction of urinary incontinence symptoms with ADL participation thereby demonstrating improved pelvic floor muscle control and strength..( MET 11/6/2024 )  Pt to demonstrate an improved score in the FOTO Urinary Problem survey  to at least 60 to demonstrate improving pelvic floor muscle function and coordination needed for improved bladder control with ADLs.  .( MET 11/6/2024 )  Pt to be able to delay the urge to urinate at least 10-15 minutes with a strong urge to urinate in order to make it to the bathroom without leaking.(MET 9/17/2024 ) made it home from a [employer] game  Pt to increase pelvic floor strength to at least 3/5 to demonstrate improved strength needed for continence with ADLs..( MET 11/6/2024 )  Pt to report being able to have a BM without straining 75% of the time to demonstrate improving PF coordination.( MET 11/6/2024 )not going great, but not straining and it is not pellets anymore  Pt to report a decrease in pain with BM to no more than minimal to none at its worst needed for more comfortable BM.  ( MET 11/6/2024 )     Goals Not achieved and why:   as above      Discharge reason : Met goals    PLAN   This patient is discharged from Outpatient Physical Therapy Services.     Trina  Rosendo, PT  11/07/2024

## 2024-11-06 NOTE — PROGRESS NOTES
Pelvic Health Physical Therapy   Plan of Care Update/Treatment Note     Name: Kamilla Montes  [facility] Number: [id number]    Therapy Diagnosis:   No diagnosis found.      Physician: Dori Cross NP    Visit Date: 11/6/2024    Physician Orders: PT Eval and Treat   Medical Diagnosis from Referral: Overactive bladder [N32.81], Mixed stress and urge urinary incontinence [N39.46]   Evaluation Date: 7/16/2024  Authorization Period Expiration: 12-31-24  Plan of Care Expiration: 10/8/2024 ***  Progress Note Due: 10/16/2024 ***  Visit # / Visits authorized: 8/20 + eval  Last FOTO provided: 9/17/2024 ***       Precautions: Standard    Time In: *** pm  Time Out: *** pm  Total Billable Time: *** minutes      Subjective     Pt reports: she had a good BM yesterday. Has had 3 bowel movements since the last visit which is good for her.       She was compliant with home exercise program.  Response to previous treatment: tolerated well   Functional change: ongoing    Pain: 0/10  Location:  not applicable      Objective   Informed verbal consent provided 11/6/2024 prior to intravaginal treatment.  Chaperone: declined      VAGINAL PELVIC FLOOR EXAM    EXTERNAL ASSESSMENT  Introitus: WNL  Skin condition: WNL  Scarring: none   Voluntary contraction: accessory muscle use - glutes and/or adductors (more adductors with verbal cues to decrease glute activation)   Voluntary relaxation: visible drop  Involuntary contraction: visible drop  Bearing down: slight lengthening   Perineal descent: absent      INTERNAL ASSESSMENT  Pain: tender areas noted as follows:none today  Sensation: able to localized pressure appropriately   Vaginal vault: asymmetrical   Muscle Bulk: hypertonus, more on left - improving   Muscle Power: 3/ 5 -inconsistent  Time: 45 second endurance hold   Quality of contraction: partial closure, no lift (able to produce mild lift at layer 3 with DKTC position)    Specificity: patient contracts: glutes/adductors   Coordination:  "tends to hold breath during PFM contration         Manual Therapy to develop flexibility and desensitization for 0 minutes including:     Colon massage  Abdominal shearing    Kamilla participated in neuromuscular re-education activities for 45 minutes including:     Pelvic  assessment as noted above to address coordination, motor control, and proprioception  Kegel endurance holds   X 45 seconds   10 x 5"   10 x 10"   Worked on complete relaxation  bearing down  Deep squat with bearing down   Quadruped sit backs + Internal femoral rotation with bearing down  Followed by Kegel contraction with return to quadruped 10 x 10"     Not today:  Bird dogs  3 x 10  Bear holds + TrA activation with Kegel  3 x 45"  Supine and seated bearing down with abdominal release  Core kick outs with Kegel , lime green band 3 x 10, 5"   Wall planks + Kegel with plantar flexion 10 x 10  Side plank + clamshell 10" followed by 10 knee drivers x 5 each side   Wall planks + Kegel with plantar flexion and knee drive 10 x 10  Side plank clamshell, blue thera band  10 x 10" each side   SEMG biofeedback for improved coordination and awareness of pelvic activation vs lengthening  Biofeedback using mirror to visualize contract relax in hook lying and happy baby   Repetitions performed in each of these positions - emphasis on more relaxation between repetitions   Child's pose + diaphragmatic breathing   Child's pose + Kegel   Sitting on towel roll + diaphragmatic breathing with bearing down   Sitting on towel roll +Kegel and relaxation x 5 minutes    Therapeutic Activity Patient participated in dynamic functional therapeutic activities to improve functional performance for 13 minutes. Including: Education as described below.   Education on bearing down, toilet habits and avoiding squatting. Needs more relaxation between repetitions.   Education on a morning bowel routine and avoiding prolonged sitting on toilet  Reviewed options for sitting on toilet in " "public including foldable toilet covers that are washable     Not today:  Review of progress, prognosis, review of all prior education and goals  Review of colon massage/ILU massage following warm water each morning  FOTO survey administered and reviewed, assessing progression of functional performance   Education on small sips of water throughout the day for improved gut absorption  Home exercise program review and update   Gave 5 x 5 Kegels per day with 3" hold- working up to 5 sets of 10 Kegels as her strength improves- she is unable to feel the squeeze and fatigues very quickly as her isolation improves      Home Exercises Provided and Patient Education Provided     Education provided:   - anatomy/physiology of pelvic floor, posture/body mechanices, and diaphragmatic breathing  Discussed progression of plan of care with patient; educated pt in activity modification; reviewed HEP with pt. Pt demonstrated and verbalized understanding of all instruction and was provided with a handout of HEP (see Patient Instructions).      Written Home Exercises Provided: yes.  Exercises were reviewed and Kamilla was able to demonstrate them prior to the end of the session.  Kamilla demonstrated good  understanding of the education provided.     See EMR under Patient Instructions for exercises provided 8/6/2024.    Assessment     Nathalia demonstrates significant improvements in overall awareness of contraction vs relaxation. Although inconsistent, her first attempt of a pelvic contraction demonstrates a 3/5 squeeze for 45 seconds which is a significant improvement. Some trouble with complete relaxation with bearing down, but again, this is still improving overall with progression in body awareness. Pt will continue to benefit from skilled outpatient physical therapy to address the deficits listed in the problem list box on initial evaluation, provide pt/family education and to maximize pt's level of independence in the home and " community environment.     Kamilla Is progressing well towards her goals.   Pt prognosis is Good.     Pt will continue to benefit from skilled outpatient physical therapy to address the deficits listed in the problem list box on initial evaluation, provide pt/family education and to maximize pt's level of independence in the home and community environment.     Pt's spiritual, cultural and educational needs considered and pt agreeable to plan of care and goals.     Anticipated barriers to physical therapy: none     Goals: (PROGRESSING 11/6/2024 )  Short Term Goals: 4 weeks   Patient to demonstrate proper use of urge delay strategies to help reduce urge urinary incontinence with activities of daily living. (MET 9/17/2024 )  Pt to demonstrate being able to correctly and consistently perform a kegel which is needed  to increase pelvic floor muscle coordination and strength needed for continence.(PART MET 9/17/2024 )  Pt to demonstrate proper positioning on commode with breathing techniques to decrease strain with BM to enable pt to feel empty after BM.(MET 9/17/2024 )  Pt to demonstrate independence with performing bowel massage to help with gut motility. (NOT MET 9/17/2024 )  Pt to be able to bulge pelvic floor which is needed for comfortable BM and complete evacuation. (NOT MET 9/17/2024 )        Long Term Goals: 12 weeks   Pt to be discharged with home plan for carry over after discharge.   (NOT MET 9/17/2024 )  Pt to be able to perform a 10 second kegel x 10 reps to demonstrate improving strength and endurance needed for continence.(PROGRESSING 9/17/2024 )  Pt to report an 80% reduction of urinary incontinence symptoms with ADL participation thereby demonstrating improved pelvic floor muscle control and strength. (PART MET 9/17/2024 ) leakage is 75% better, urgency is 70-75% better)  Pt to demonstrate an improved score in the FOTO Urinary Problem survey  to at least 60 to demonstrate improving pelvic floor muscle  function and coordination needed for improved bladder control with ADLs.  (PROGRESSING 9/17/2024 )   Pt to be able to delay the urge to urinate at least 10-15 minutes with a strong urge to urinate in order to make it to the bathroom without leaking.(MET 9/17/2024 )  Pt to increase pelvic floor strength to at least 3/5 to demonstrate improved strength needed for continence with ADLs. (NOT MET 9/17/2024 )  Pt to report being able to have a BM without straining 75% of the time to demonstrate improving PF coordination.  (NOT MET 9/17/2024 )  Pt to report a decrease in pain with BM to no more than minimal to none at its worst needed for more comfortable BM.    (NOT MET 9/17/2024 ) abdominal stabbing with days of constipation         PLAN      Plan of care Certification: 7/16/2024 to 10/8/2024.     Outpatient Physical Therapy 1-2 times weekly for 12 weeks     Cont per plan of care: work on bearing down 9/26/2024. Check on continued anterior pelvic floor strengthening and headaches    Trina Robbins, PT

## 2024-11-07 PROBLEM — M62.89 PELVIC FLOOR DYSFUNCTION: Status: RESOLVED | Noted: 2024-07-20 | Resolved: 2024-11-07

## 2025-02-04 ENCOUNTER — TELEPHONE (OUTPATIENT)
Dept: DERMATOLOGY | Facility: CLINIC | Age: 55
End: 2025-02-04
Payer: COMMERCIAL

## 2025-02-04 NOTE — TELEPHONE ENCOUNTER
----- Message from Erin sent at 2/4/2025 11:36 AM CST -----  Contact: Patient  Type:  Patient Returning Call    Who Called:  Patient  Who Left Message for Patient:   Jeni  Does the patient know what this is regarding?:    Rescheduling 4/15 appointment    Would the patient rather a call back or a response via [facility]?   Call back  Best Call Back Number:  [phone]    Additional Information:   States she is replying to a message in the portal to contact office to reschedule appointment - please call back - thank you

## 2025-04-01 ENCOUNTER — OFFICE VISIT (OUTPATIENT)
Dept: DERMATOLOGY | Facility: CLINIC | Age: 55
End: 2025-04-01
Payer: COMMERCIAL

## 2025-04-01 VITALS — HEIGHT: 67 IN | BODY MASS INDEX: 21.63 KG/M2 | WEIGHT: 137.81 LBS

## 2025-04-01 DIAGNOSIS — L66.12 FRONTAL FIBROSING ALOPECIA: Primary | ICD-10-CM

## 2025-04-01 PROCEDURE — 1160F RVW MEDS BY RX/DR IN RCRD: CPT | Mod: CPTII,S$GLB,, | Performed by: DERMATOLOGY

## 2025-04-01 PROCEDURE — 11900 INJECT SKIN LESIONS </W 7: CPT | Mod: S$GLB,,, | Performed by: DERMATOLOGY

## 2025-04-01 PROCEDURE — 3008F BODY MASS INDEX DOCD: CPT | Mod: CPTII,S$GLB,, | Performed by: DERMATOLOGY

## 2025-04-01 PROCEDURE — 1159F MED LIST DOCD IN RCRD: CPT | Mod: CPTII,S$GLB,, | Performed by: DERMATOLOGY

## 2025-04-01 PROCEDURE — 99214 OFFICE O/P EST MOD 30 MIN: CPT | Mod: 25,S$GLB,, | Performed by: DERMATOLOGY

## 2025-04-01 RX ORDER — BIMATOPROST 3 UG/ML
SOLUTION TOPICAL
Qty: 5 ML | Refills: 12 | Status: SHIPPED | OUTPATIENT
Start: 2025-04-01

## 2025-04-01 RX ORDER — DUTASTERIDE 0.5 MG/1
0.5 CAPSULE, LIQUID FILLED ORAL DAILY
Qty: 90 CAPSULE | Refills: 3 | Status: SHIPPED | OUTPATIENT
Start: 2025-04-01

## 2025-04-01 RX ORDER — HYDROXYCHLOROQUINE SULFATE 200 MG/1
TABLET, FILM COATED ORAL
Qty: 30 TABLET | Refills: 2 | Status: SHIPPED | OUTPATIENT
Start: 2025-04-01

## 2025-04-01 NOTE — PROGRESS NOTES
Subjective:      Patient ID:  Kamilla Montes is a 54 y.o. female who presents for   Chief Complaint   Patient presents with    Alopecia     LOV 10/18/24 FFA     Patient here today for f/u on FFA   States she is still noticing shedding. Eyebrows are mostly gone  Continues finasteride 5mg daily minoxifil 1.25 mg daily  Applying rogaine sometimes, not daily   Latisse on eyebrows and lashes, very expensive        Has no hx of NMSC  Has no fhx of MM    Current Outpatient Medications:   ·  bimatoprost (LATISSE) 0.03 % ophthalmic solution, Place one drop on applicator and apply evenly along the skin of the upper eyelid at base of eyelashes once daily at bedtime; repeat procedure for second eye (use a clean applicator)., Disp: 5 mL, Rfl: 12  ·  estradioL (ESTRACE) 1 MG tablet, Take 1 tablet (1 mg total) by mouth once daily., Disp: 90 tablet, Rfl: 3  ·  finasteride (PROSCAR) 5 mg tablet, Take 1 tablet (5 mg total) by mouth once daily., Disp: 90 tablet, Rfl: 3  ·  minoxidiL (LONITEN) 2.5 MG tablet, Take 0.5 tablets (1.25 mg total) by mouth once daily., Disp: 45 tablet, Rfl: 3  ·  minoxidiL 5 % Foam, Apply half a capful to scalp QAM, Disp: 60 g, Rfl: 11  ·  progesterone (PROMETRIUM) 100 MG capsule, Take 1 capsule (100 mg total) by mouth nightly., Disp: 90 capsule, Rfl: 3            Review of Systems   Constitutional:  Negative for fever, chills and fatigue.   Respiratory:  Negative for cough and shortness of breath.    Skin:  Positive for activity-related sunscreen use. Negative for itching, rash, dry skin, daily sunscreen use and wears hat.       Objective:   Physical Exam   Constitutional: She appears well-developed and well-nourished. No distress.   Neurological: She is alert and oriented to person, place, and time. She is not disoriented.   Psychiatric: She has a normal mood and affect.   Skin:   Areas Examined (abnormalities noted in diagram):   Scalp / Hair Palpated and Inspected  Head / Face Inspection Performed  Neck  Inspection Performed            Diagram Legend     Erythematous scaling macule/papule c/w actinic keratosis       Vascular papule c/w angioma      Pigmented verrucoid papule/plaque c/w seborrheic keratosis      Yellow umbilicated papule c/w sebaceous hyperplasia      Irregularly shaped tan macule c/w lentigo     1-2 mm smooth white papules consistent with Milia      Movable subcutaneous cyst with punctum c/w epidermal inclusion cyst      Subcutaneous movable cyst c/w pilar cyst      Firm pink to brown papule c/w dermatofibroma      Pedunculated fleshy papule(s) c/w skin tag(s)      Evenly pigmented macule c/w junctional nevus     Mildly variegated pigmented, slightly irregular-bordered macule c/w mildly atypical nevus      Flesh colored to evenly pigmented papule c/w intradermal nevus       Pink pearly papule/plaque c/w basal cell carcinoma      Erythematous hyperkeratotic cursted plaque c/w SCC      Surgical scar with no sign of skin cancer recurrence      Open and closed comedones      Inflammatory papules and pustules      Verrucoid papule consistent consistent with wart     Erythematous eczematous patches and plaques     Dystrophic onycholytic nail with subungual debris c/w onychomycosis     Umbilicated papule    Erythematous-base heme-crusted tan verrucoid plaque consistent with inflamed seborrheic keratosis     Erythematous Silvery Scaling Plaque c/w Psoriasis     See annotation   Latest Reference Range & Units 07/22/24 15:15   Sodium 136 - 145 mmol/L 140   Potassium 3.5 - 5.1 mmol/L 3.7   Chloride 95 - 110 mmol/L 101   CO2 23 - 29 mmol/L 25   Anion Gap 8 - 16 mmol/L 14   BUN 6 - 20 mg/dL 12   Creatinine 0.5 - 1.4 mg/dL 0.9   eGFR >60 mL/min/1.73 m^2 >60.0   Glucose 70 - 110 mg/dL 96   Calcium 8.7 - 10.5 mg/dL 9.9   Magnesium  1.6 - 2.6 mg/dL 2.0   ALP 55 - 135 U/L 99   PROTEIN TOTAL 6.0 - 8.4 g/dL 8.8 (H)   Albumin 3.5 - 5.2 g/dL 4.1   BILIRUBIN TOTAL 0.1 - 1.0 mg/dL 0.5   AST 10 - 40 U/L 17   ALT 10 - 44 U/L  15   CRP 0.0 - 8.2 mg/L 19.3 (H)   (H): Data is abnormally high   Latest Reference Range & Units 07/22/24 15:16   WBC 3.90 - 12.70 K/uL 6.53   RBC 4.00 - 5.40 M/uL 4.45   Hemoglobin 12.0 - 16.0 g/dL 13.4   Hematocrit 37.0 - 48.5 % 41.4   MCV 82 - 98 fL 93   MCH 27.0 - 31.0 pg 30.1   MCHC 32.0 - 36.0 g/dL 32.4   RDW 11.5 - 14.5 % 12.9   Platelet Count 150 - 450 K/uL 405   MPV 9.2 - 12.9 fL 8.9 (L)   Gran % 38.0 - 73.0 % 76.0 (H)   Lymph % 18.0 - 48.0 % 18.7   Mono % 4.0 - 15.0 % 3.8 (L)   Eos % 0.0 - 8.0 % 0.6   Basophil % 0.0 - 1.9 % 0.6   Immature Granulocytes 0.0 - 0.5 % 0.3   Gran # (ANC) 1.8 - 7.7 K/uL 5.0   Lymph # 1.0 - 4.8 K/uL 1.2   Mono # 0.3 - 1.0 K/uL 0.3   Eos # 0.0 - 0.5 K/uL 0.0   Baso # 0.00 - 0.20 K/uL 0.04   Immature Grans (Abs) 0.00 - 0.04 K/uL 0.02   nRBC 0 /100 WBC 0   Differential Method  Automated   Ferritin 20.0 - 300.0 ng/mL 317 (H)   Vitamin B12 210 - 950 pg/mL 463   Sed Rate 0 - 20 mm/Hr 68 (H)   (L): Data is abnormally low  (H): Data is abnormally high            Assessment / Plan:        Frontal fibrosing alopecia  -     dutasteride (AVODART) 0.5 mg capsule; Take 1 capsule (0.5 mg total) by mouth once daily.  Dispense: 90 capsule; Refill: 3  -     hydroxychloroquine (PLAQUENIL) 200 mg tablet; Take one tab PO daily  Dispense: 30 tablet; Refill: 2  -     bimatoprost (LATISSE) 0.03 % ophthalmic solution; Place one drop on applicator and apply evenly along the skin of the upper eyelid at base of eyelashes once daily at bedtime; repeat procedure for second eye (use a clean applicator).  Dispense: 5 mL; Refill: 12    Still with subjective disease activity (patient reported) and clinical mild perifollicular erythema    Continue minoxidil 1.25mg  Stop finasteride, start dutasteride  Add plaquenil 200mg daily (last eye exam one year ago, goes annually, will mention plaquenil start to her ophthalmologist)  ILK today  Consider adding topical tacrolimus (compounded)    Intralesional Kenalog 2.5mg/cc  (2 cc total) injected into 1 lesions on the anterior hairline today after obtaining verbal consent including risk of surrounding hypopigmentation. Patient tolerated procedure well.    Units: 1  NDC for Kenalog 10mg/cc:  2431-7125-56    Discussed benefits and risks for treatment with Plaquenil with patient. Patient will require a baseline and ophthalmologic examination (annual to q5 yrs per her ophtho). Needs baseline CBC and LFT's. Will need CBC q month x 3 then q 3 - 6 months. Patient expresses understanding.  Needs aggressive sun protection.               No follow-ups on file.

## 2025-05-15 ENCOUNTER — OFFICE VISIT (OUTPATIENT)
Dept: DERMATOLOGY | Facility: CLINIC | Age: 55
End: 2025-05-15
Payer: COMMERCIAL

## 2025-05-15 VITALS — BODY MASS INDEX: 23.65 KG/M2 | WEIGHT: 151 LBS

## 2025-05-15 DIAGNOSIS — L66.12 FRONTAL FIBROSING ALOPECIA: Primary | ICD-10-CM

## 2025-05-15 PROCEDURE — 3008F BODY MASS INDEX DOCD: CPT | Mod: CPTII,S$GLB,, | Performed by: DERMATOLOGY

## 2025-05-15 PROCEDURE — 1160F RVW MEDS BY RX/DR IN RCRD: CPT | Mod: CPTII,S$GLB,, | Performed by: DERMATOLOGY

## 2025-05-15 PROCEDURE — 1159F MED LIST DOCD IN RCRD: CPT | Mod: CPTII,S$GLB,, | Performed by: DERMATOLOGY

## 2025-05-15 PROCEDURE — 99213 OFFICE O/P EST LOW 20 MIN: CPT | Mod: S$GLB,,, | Performed by: DERMATOLOGY

## 2025-05-15 RX ORDER — CLOBETASOL PROPIONATE 0.5 MG/ML
SOLUTION TOPICAL
Qty: 50 ML | Refills: 2 | Status: SHIPPED | OUTPATIENT
Start: 2025-05-15

## 2025-05-15 NOTE — PROGRESS NOTES
Subjective:      Patient ID:  Kamilla Montes is a 54 y.o. female who presents for   Chief Complaint   Patient presents with    Alopecia     Follow up     LOV 04/01/2025  frontal fibrosing alopecia    Patient here today for f/u on FFA   Had ILK at LOV  States that she is dong good on the medication and thinks she is noticing new hair growth. Can not tell if eyebrows are starting to grown back. She thinks the ones she has are getting longer.   Patient stopped finasteride and started dutasterid 0.5mg tab. She is taking plaquenil 200 mg and still latisse 0.03.  Applying rogaine foam using every other day and taking 1/2 tab of minoxidil daily.   Scalp is not itchy      Derm Hx  Has no hx of NMSC  Has no fhx of MM    Current Outpatient Medications:   ·  bimatoprost (LATISSE) 0.03 % ophthalmic solution, Place one drop on applicator and apply evenly along the skin of the upper eyelid at base of eyelashes once daily at bedtime; repeat procedure for second eye (use a clean applicator)., Disp: 5 mL, Rfl: 12  ·  dutasteride (AVODART) 0.5 mg capsule, Take 1 capsule (0.5 mg total) by mouth once daily., Disp: 90 capsule, Rfl: 3  ·  estradioL (ESTRACE) 1 MG tablet, Take 1 tablet (1 mg total) by mouth once daily., Disp: 90 tablet, Rfl: 3  ·  hydroxychloroquine (PLAQUENIL) 200 mg tablet, Take one tab PO daily, Disp: 30 tablet, Rfl: 2  ·  minoxidiL (LONITEN) 2.5 MG tablet, Take 0.5 tablets (1.25 mg total) by mouth once daily., Disp: 45 tablet, Rfl: 3  ·  minoxidiL 5 % Foam, Apply half a capful to scalp QAM, Disp: 60 g, Rfl: 11  ·  progesterone (PROMETRIUM) 100 MG capsule, Take 1 capsule (100 mg total) by mouth nightly., Disp: 90 capsule, Rfl: 3    Current Facility-Administered Medications:   ·  triamcinolone acetonide injection 10 mg, 10 mg, Intradermal, 1 time in Clinic/HOD, Jocelyn Real MD  ·  triamcinolone acetonide injection 10 mg, 10 mg, Intradermal, 1 time in Clinic/HOD, Jocelyn Real MD  ·  triamcinolone  acetonide injection 6 mg, 6 mg, Intradermal, 1 time in Clinic/HOD,              Review of Systems   Constitutional:  Negative for fever, chills and fatigue.   Respiratory:  Negative for cough and shortness of breath.    Skin:  Positive for activity-related sunscreen use. Negative for itching, rash, dry skin, daily sunscreen use and wears hat.       Objective:   Physical Exam   Constitutional: She appears well-developed and well-nourished. No distress.   Neurological: She is alert and oriented to person, place, and time. She is not disoriented.   Psychiatric: She has a normal mood and affect.   Skin:   Areas Examined (abnormalities noted in diagram):   Scalp / Hair Palpated and Inspected  Head / Face Inspection Performed  Neck Inspection Performed            Diagram Legend     Erythematous scaling macule/papule c/w actinic keratosis       Vascular papule c/w angioma      Pigmented verrucoid papule/plaque c/w seborrheic keratosis      Yellow umbilicated papule c/w sebaceous hyperplasia      Irregularly shaped tan macule c/w lentigo     1-2 mm smooth white papules consistent with Milia      Movable subcutaneous cyst with punctum c/w epidermal inclusion cyst      Subcutaneous movable cyst c/w pilar cyst      Firm pink to brown papule c/w dermatofibroma      Pedunculated fleshy papule(s) c/w skin tag(s)      Evenly pigmented macule c/w junctional nevus     Mildly variegated pigmented, slightly irregular-bordered macule c/w mildly atypical nevus      Flesh colored to evenly pigmented papule c/w intradermal nevus       Pink pearly papule/plaque c/w basal cell carcinoma      Erythematous hyperkeratotic cursted plaque c/w SCC      Surgical scar with no sign of skin cancer recurrence      Open and closed comedones      Inflammatory papules and pustules      Verrucoid papule consistent consistent with wart     Erythematous eczematous patches and plaques     Dystrophic onycholytic nail with subungual debris c/w onychomycosis      Umbilicated papule    Erythematous-base heme-crusted tan verrucoid plaque consistent with inflamed seborrheic keratosis     Erythematous Silvery Scaling Plaque c/w Psoriasis     See annotation      Assessment / Plan:        Frontal fibrosing alopecia  -     clobetasoL (TEMOVATE) 0.05 % external solution; AAA frontal hairline QOD  Dispense: 50 mL; Refill: 2    Doing better on current regimen, continue  - plaquenil 200 daily  - dutasteride 0.5mg daily  - minoxidil 1.25mg daily  - add topical steorid QOD  Past protopic ointment, too greasy  Available in compounded solution, consider (hair stim)    Past ILK x3  No longer pruritic, erythematous or scaly    Uses nutrafol shampoo and conditioner           Follow up in about 3 months (around 8/15/2025).

## 2025-05-27 ENCOUNTER — HOSPITAL ENCOUNTER (OUTPATIENT)
Dept: RADIOLOGY | Facility: CLINIC | Age: 55
Discharge: HOME | End: 2025-05-27
Attending: STUDENT IN AN ORGANIZED HEALTH CARE EDUCATION/TRAINING PROGRAM
Payer: COMMERCIAL

## 2025-05-27 DIAGNOSIS — Z12.31 ENCOUNTER FOR SCREENING MAMMOGRAM FOR BREAST CANCER: ICD-10-CM

## 2025-05-27 PROCEDURE — 77067 SCR MAMMO BI INCL CAD: CPT | Mod: TC,PO

## 2025-05-27 PROCEDURE — 77067 SCR MAMMO BI INCL CAD: CPT | Mod: 26,,, | Performed by: RADIOLOGY

## 2025-05-27 PROCEDURE — 77063 BREAST TOMOSYNTHESIS BI: CPT | Mod: 26,,, | Performed by: RADIOLOGY

## 2025-05-28 ENCOUNTER — RESULTS FOLLOW-UP (OUTPATIENT)
Dept: PRIMARY CARE CLINIC | Facility: CLINIC | Age: 55
End: 2025-05-28

## 2025-06-06 ENCOUNTER — PATIENT MESSAGE (OUTPATIENT)
Dept: DERMATOLOGY | Facility: CLINIC | Age: 55
End: 2025-06-06
Payer: COMMERCIAL

## 2025-07-14 ENCOUNTER — PATIENT MESSAGE (OUTPATIENT)
Dept: DERMATOLOGY | Facility: CLINIC | Age: 55
End: 2025-07-14
Payer: COMMERCIAL

## 2025-07-14 DIAGNOSIS — L66.12 FRONTAL FIBROSING ALOPECIA: ICD-10-CM

## 2025-07-14 RX ORDER — HYDROXYCHLOROQUINE SULFATE 200 MG/1
TABLET, FILM COATED ORAL
Qty: 90 TABLET | Refills: 3 | Status: SHIPPED | OUTPATIENT
Start: 2025-07-14

## 2025-08-12 ENCOUNTER — OFFICE VISIT (OUTPATIENT)
Dept: DERMATOLOGY | Facility: CLINIC | Age: 55
End: 2025-08-12
Payer: COMMERCIAL

## 2025-08-12 VITALS — BODY MASS INDEX: 21.19 KG/M2 | WEIGHT: 135 LBS | HEIGHT: 67 IN

## 2025-08-12 DIAGNOSIS — L66.12 FRONTAL FIBROSING ALOPECIA: Primary | ICD-10-CM

## 2025-08-12 PROCEDURE — 1160F RVW MEDS BY RX/DR IN RCRD: CPT | Mod: CPTII,S$GLB,, | Performed by: DERMATOLOGY

## 2025-08-12 PROCEDURE — 11900 INJECT SKIN LESIONS </W 7: CPT | Mod: S$GLB,,, | Performed by: DERMATOLOGY

## 2025-08-12 PROCEDURE — 1159F MED LIST DOCD IN RCRD: CPT | Mod: CPTII,S$GLB,, | Performed by: DERMATOLOGY

## 2025-08-12 PROCEDURE — 99213 OFFICE O/P EST LOW 20 MIN: CPT | Mod: 25,S$GLB,, | Performed by: DERMATOLOGY

## 2025-08-12 PROCEDURE — 3008F BODY MASS INDEX DOCD: CPT | Mod: CPTII,S$GLB,, | Performed by: DERMATOLOGY

## 2025-08-12 RX ORDER — TRIAMCINOLONE ACETONIDE 10 MG/ML
6 INJECTION, SUSPENSION INTRA-ARTICULAR; INTRALESIONAL
Status: SHIPPED | OUTPATIENT
Start: 2025-08-12